# Patient Record
Sex: FEMALE | Race: WHITE | NOT HISPANIC OR LATINO | Employment: OTHER | URBAN - NONMETROPOLITAN AREA
[De-identification: names, ages, dates, MRNs, and addresses within clinical notes are randomized per-mention and may not be internally consistent; named-entity substitution may affect disease eponyms.]

---

## 2019-02-21 ENCOUNTER — TRANSCRIBE ORDERS (OUTPATIENT)
Dept: LAB | Facility: CLINIC | Age: 49
End: 2019-02-21

## 2019-02-21 ENCOUNTER — APPOINTMENT (OUTPATIENT)
Dept: LAB | Facility: CLINIC | Age: 49
End: 2019-02-21
Payer: COMMERCIAL

## 2019-02-21 DIAGNOSIS — E55.9 VITAMIN D DEFICIENCY: Primary | ICD-10-CM

## 2019-02-21 DIAGNOSIS — E66.9 OBESITY, UNSPECIFIED CLASSIFICATION, UNSPECIFIED OBESITY TYPE, UNSPECIFIED WHETHER SERIOUS COMORBIDITY PRESENT: ICD-10-CM

## 2019-02-21 DIAGNOSIS — E55.9 VITAMIN D DEFICIENCY: ICD-10-CM

## 2019-02-21 DIAGNOSIS — R73.03 PRE-DIABETES: ICD-10-CM

## 2019-02-21 LAB
25(OH)D3 SERPL-MCNC: 12.5 NG/ML (ref 30–100)
ALBUMIN SERPL BCP-MCNC: 3.8 G/DL (ref 3.5–5)
ALP SERPL-CCNC: 86 U/L (ref 46–116)
ALT SERPL W P-5'-P-CCNC: 17 U/L (ref 12–78)
ANION GAP SERPL CALCULATED.3IONS-SCNC: 6 MMOL/L (ref 4–13)
AST SERPL W P-5'-P-CCNC: 16 U/L (ref 5–45)
BASOPHILS # BLD AUTO: 0.08 THOUSANDS/ΜL (ref 0–0.1)
BASOPHILS NFR BLD AUTO: 1 % (ref 0–1)
BILIRUB SERPL-MCNC: 0.34 MG/DL (ref 0.2–1)
BUN SERPL-MCNC: 15 MG/DL (ref 5–25)
CALCIUM SERPL-MCNC: 8.7 MG/DL (ref 8.3–10.1)
CHLORIDE SERPL-SCNC: 110 MMOL/L (ref 100–108)
CHOLEST SERPL-MCNC: 217 MG/DL (ref 50–200)
CO2 SERPL-SCNC: 23 MMOL/L (ref 21–32)
CREAT SERPL-MCNC: 0.81 MG/DL (ref 0.6–1.3)
EOSINOPHIL # BLD AUTO: 0.1 THOUSAND/ΜL (ref 0–0.61)
EOSINOPHIL NFR BLD AUTO: 1 % (ref 0–6)
ERYTHROCYTE [DISTWIDTH] IN BLOOD BY AUTOMATED COUNT: 13.4 % (ref 11.6–15.1)
EST. AVERAGE GLUCOSE BLD GHB EST-MCNC: 105 MG/DL
GFR SERPL CREATININE-BSD FRML MDRD: 86 ML/MIN/1.73SQ M
GLUCOSE P FAST SERPL-MCNC: 82 MG/DL (ref 65–99)
HBA1C MFR BLD: 5.3 % (ref 4.2–6.3)
HCT VFR BLD AUTO: 43.6 % (ref 34.8–46.1)
HDLC SERPL-MCNC: 49 MG/DL (ref 40–60)
HGB BLD-MCNC: 14.8 G/DL (ref 11.5–15.4)
IMM GRANULOCYTES # BLD AUTO: 0.01 THOUSAND/UL (ref 0–0.2)
IMM GRANULOCYTES NFR BLD AUTO: 0 % (ref 0–2)
INSULIN SERPL-ACNC: 8.7 MU/L (ref 3–25)
LDLC SERPL CALC-MCNC: 146 MG/DL (ref 0–100)
LYMPHOCYTES # BLD AUTO: 2.16 THOUSANDS/ΜL (ref 0.6–4.47)
LYMPHOCYTES NFR BLD AUTO: 30 % (ref 14–44)
MCH RBC QN AUTO: 30.9 PG (ref 26.8–34.3)
MCHC RBC AUTO-ENTMCNC: 33.9 G/DL (ref 31.4–37.4)
MCV RBC AUTO: 91 FL (ref 82–98)
MONOCYTES # BLD AUTO: 0.59 THOUSAND/ΜL (ref 0.17–1.22)
MONOCYTES NFR BLD AUTO: 8 % (ref 4–12)
NEUTROPHILS # BLD AUTO: 4.27 THOUSANDS/ΜL (ref 1.85–7.62)
NEUTS SEG NFR BLD AUTO: 60 % (ref 43–75)
NONHDLC SERPL-MCNC: 168 MG/DL
NRBC BLD AUTO-RTO: 0 /100 WBCS
PLATELET # BLD AUTO: 234 THOUSANDS/UL (ref 149–390)
PMV BLD AUTO: 10.4 FL (ref 8.9–12.7)
POTASSIUM SERPL-SCNC: 4.1 MMOL/L (ref 3.5–5.3)
PROT SERPL-MCNC: 7.5 G/DL (ref 6.4–8.2)
RBC # BLD AUTO: 4.79 MILLION/UL (ref 3.81–5.12)
SODIUM SERPL-SCNC: 139 MMOL/L (ref 136–145)
TRIGL SERPL-MCNC: 110 MG/DL
TSH SERPL DL<=0.05 MIU/L-ACNC: 1.18 UIU/ML (ref 0.36–3.74)
WBC # BLD AUTO: 7.21 THOUSAND/UL (ref 4.31–10.16)

## 2019-02-21 PROCEDURE — 80053 COMPREHEN METABOLIC PANEL: CPT

## 2019-02-21 PROCEDURE — 36415 COLL VENOUS BLD VENIPUNCTURE: CPT

## 2019-02-21 PROCEDURE — 80061 LIPID PANEL: CPT

## 2019-02-21 PROCEDURE — 85025 COMPLETE CBC W/AUTO DIFF WBC: CPT

## 2019-02-21 PROCEDURE — 82306 VITAMIN D 25 HYDROXY: CPT

## 2019-02-21 PROCEDURE — 83525 ASSAY OF INSULIN: CPT

## 2019-02-21 PROCEDURE — 84443 ASSAY THYROID STIM HORMONE: CPT

## 2019-02-21 PROCEDURE — 83036 HEMOGLOBIN GLYCOSYLATED A1C: CPT

## 2019-02-26 ENCOUNTER — TRANSCRIBE ORDERS (OUTPATIENT)
Dept: ADMINISTRATIVE | Facility: HOSPITAL | Age: 49
End: 2019-02-26

## 2019-02-26 DIAGNOSIS — Z12.39 SCREENING BREAST EXAMINATION: Primary | ICD-10-CM

## 2019-03-27 ENCOUNTER — APPOINTMENT (OUTPATIENT)
Dept: LAB | Facility: HOSPITAL | Age: 49
End: 2019-03-27
Attending: SPECIALIST
Payer: COMMERCIAL

## 2019-03-27 ENCOUNTER — TRANSCRIBE ORDERS (OUTPATIENT)
Dept: ADMINISTRATIVE | Facility: HOSPITAL | Age: 49
End: 2019-03-27

## 2019-03-27 DIAGNOSIS — R30.0 DYSURIA: ICD-10-CM

## 2019-03-27 DIAGNOSIS — R35.0 FREQUENCY OF MICTURITION: Primary | ICD-10-CM

## 2019-03-27 LAB
BACTERIA UR QL AUTO: ABNORMAL /HPF
BILIRUB UR QL STRIP: NEGATIVE
CLARITY UR: ABNORMAL
COLOR UR: YELLOW
GLUCOSE UR STRIP-MCNC: NEGATIVE MG/DL
HGB UR QL STRIP.AUTO: ABNORMAL
KETONES UR STRIP-MCNC: NEGATIVE MG/DL
LEUKOCYTE ESTERASE UR QL STRIP: NEGATIVE
MUCOUS THREADS UR QL AUTO: ABNORMAL
NITRITE UR QL STRIP: NEGATIVE
NON-SQ EPI CELLS URNS QL MICRO: ABNORMAL /HPF
PH UR STRIP.AUTO: 5 [PH]
PROT UR STRIP-MCNC: NEGATIVE MG/DL
RBC #/AREA URNS AUTO: ABNORMAL /HPF
SP GR UR STRIP.AUTO: >=1.03 (ref 1–1.03)
UROBILINOGEN UR QL STRIP.AUTO: 0.2 E.U./DL
WBC #/AREA URNS AUTO: ABNORMAL /HPF

## 2019-03-27 PROCEDURE — 81001 URINALYSIS AUTO W/SCOPE: CPT | Performed by: SPECIALIST

## 2019-03-27 PROCEDURE — 87086 URINE CULTURE/COLONY COUNT: CPT

## 2019-03-27 PROCEDURE — 81003 URINALYSIS AUTO W/O SCOPE: CPT | Performed by: SPECIALIST

## 2019-03-28 LAB — BACTERIA UR CULT: NORMAL

## 2019-04-03 ENCOUNTER — TRANSCRIBE ORDERS (OUTPATIENT)
Dept: LAB | Facility: CLINIC | Age: 49
End: 2019-04-03

## 2019-04-03 ENCOUNTER — APPOINTMENT (OUTPATIENT)
Dept: LAB | Facility: HOSPITAL | Age: 49
End: 2019-04-03
Attending: SPECIALIST
Payer: COMMERCIAL

## 2019-04-03 ENCOUNTER — APPOINTMENT (OUTPATIENT)
Dept: LAB | Facility: CLINIC | Age: 49
End: 2019-04-03
Payer: COMMERCIAL

## 2019-04-03 DIAGNOSIS — Z13.9 SCREENING FOR UNSPECIFIED CONDITION: ICD-10-CM

## 2019-04-03 DIAGNOSIS — Z13.9 SCREENING FOR UNSPECIFIED CONDITION: Primary | ICD-10-CM

## 2019-04-03 PROCEDURE — 81001 URINALYSIS AUTO W/SCOPE: CPT | Performed by: SPECIALIST

## 2019-04-03 PROCEDURE — 86803 HEPATITIS C AB TEST: CPT

## 2019-04-03 PROCEDURE — 81003 URINALYSIS AUTO W/O SCOPE: CPT | Performed by: SPECIALIST

## 2019-04-03 PROCEDURE — 36415 COLL VENOUS BLD VENIPUNCTURE: CPT

## 2019-04-03 PROCEDURE — 87389 HIV-1 AG W/HIV-1&-2 AB AG IA: CPT

## 2019-04-04 LAB — HCV AB SER QL: NORMAL

## 2019-04-05 LAB — HIV 1+2 AB+HIV1 P24 AG SERPL QL IA: NORMAL

## 2019-04-08 ENCOUNTER — TELEPHONE (OUTPATIENT)
Dept: UROLOGY | Facility: AMBULATORY SURGERY CENTER | Age: 49
End: 2019-04-08

## 2019-04-10 RX ORDER — ATORVASTATIN CALCIUM 10 MG/1
10 TABLET, FILM COATED ORAL DAILY
Refills: 5 | COMMUNITY
Start: 2019-04-02 | End: 2020-08-26 | Stop reason: SDUPTHER

## 2019-04-11 ENCOUNTER — OFFICE VISIT (OUTPATIENT)
Dept: UROLOGY | Facility: MEDICAL CENTER | Age: 49
End: 2019-04-11
Payer: COMMERCIAL

## 2019-04-11 VITALS
DIASTOLIC BLOOD PRESSURE: 80 MMHG | SYSTOLIC BLOOD PRESSURE: 132 MMHG | HEART RATE: 90 BPM | HEIGHT: 64 IN | WEIGHT: 218 LBS | BODY MASS INDEX: 37.22 KG/M2

## 2019-04-11 DIAGNOSIS — R31.29 MICROHEMATURIA: Primary | ICD-10-CM

## 2019-04-11 DIAGNOSIS — R35.0 URINARY FREQUENCY: ICD-10-CM

## 2019-04-11 LAB
SL AMB  POCT GLUCOSE, UA: ABNORMAL
SL AMB LEUKOCYTE ESTERASE,UA: ABNORMAL
SL AMB POCT BILIRUBIN,UA: ABNORMAL
SL AMB POCT BLOOD,UA: ABNORMAL
SL AMB POCT CLARITY,UA: CLEAR
SL AMB POCT COLOR,UA: YELLOW
SL AMB POCT KETONES,UA: ABNORMAL
SL AMB POCT NITRITE,UA: ABNORMAL
SL AMB POCT PH,UA: 5.5
SL AMB POCT SPECIFIC GRAVITY,UA: 1.02
SL AMB POCT URINE PROTEIN: ABNORMAL
SL AMB POCT UROBILINOGEN: 0.2

## 2019-04-11 PROCEDURE — 81003 URINALYSIS AUTO W/O SCOPE: CPT | Performed by: UROLOGY

## 2019-04-11 PROCEDURE — 99244 OFF/OP CNSLTJ NEW/EST MOD 40: CPT | Performed by: UROLOGY

## 2019-04-11 RX ORDER — MELATONIN
1000 DAILY
COMMUNITY
End: 2020-02-11

## 2019-04-11 RX ORDER — OXYBUTYNIN CHLORIDE 5 MG/1
5 TABLET ORAL 3 TIMES DAILY
Qty: 60 TABLET | Refills: 3 | Status: SHIPPED | OUTPATIENT
Start: 2019-04-11 | End: 2020-02-11

## 2019-04-18 ENCOUNTER — HOSPITAL ENCOUNTER (OUTPATIENT)
Dept: CT IMAGING | Facility: HOSPITAL | Age: 49
Discharge: HOME | End: 2019-04-18
Attending: UROLOGY
Payer: COMMERCIAL

## 2019-04-18 DIAGNOSIS — R31.29 MICROHEMATURIA: ICD-10-CM

## 2019-04-18 PROCEDURE — 74178 CT ABD&PLV WO CNTR FLWD CNTR: CPT

## 2019-04-18 RX ADMIN — IOHEXOL 120 ML: 350 INJECTION, SOLUTION INTRAVENOUS at 07:59

## 2019-05-01 ENCOUNTER — PROCEDURE VISIT (OUTPATIENT)
Dept: UROLOGY | Facility: MEDICAL CENTER | Age: 49
End: 2019-05-01
Payer: COMMERCIAL

## 2019-05-01 VITALS
SYSTOLIC BLOOD PRESSURE: 160 MMHG | WEIGHT: 212 LBS | DIASTOLIC BLOOD PRESSURE: 80 MMHG | HEART RATE: 80 BPM | BODY MASS INDEX: 36.39 KG/M2

## 2019-05-01 DIAGNOSIS — R31.29 MICROHEMATURIA: Primary | ICD-10-CM

## 2019-05-01 LAB
SL AMB  POCT GLUCOSE, UA: ABNORMAL
SL AMB LEUKOCYTE ESTERASE,UA: ABNORMAL
SL AMB POCT BILIRUBIN,UA: ABNORMAL
SL AMB POCT BLOOD,UA: ABNORMAL
SL AMB POCT CLARITY,UA: CLEAR
SL AMB POCT COLOR,UA: YELLOW
SL AMB POCT KETONES,UA: ABNORMAL
SL AMB POCT NITRITE,UA: ABNORMAL
SL AMB POCT PH,UA: 5
SL AMB POCT SPECIFIC GRAVITY,UA: 1.02
SL AMB POCT URINE PROTEIN: ABNORMAL
SL AMB POCT UROBILINOGEN: 0.2

## 2019-05-01 PROCEDURE — 52000 CYSTOURETHROSCOPY: CPT | Performed by: UROLOGY

## 2019-05-01 PROCEDURE — 81003 URINALYSIS AUTO W/O SCOPE: CPT | Performed by: UROLOGY

## 2019-05-01 PROCEDURE — 99213 OFFICE O/P EST LOW 20 MIN: CPT | Performed by: UROLOGY

## 2019-06-13 ENCOUNTER — TRANSCRIBE ORDERS (OUTPATIENT)
Dept: LAB | Facility: CLINIC | Age: 49
End: 2019-06-13

## 2019-06-13 ENCOUNTER — APPOINTMENT (OUTPATIENT)
Dept: LAB | Facility: CLINIC | Age: 49
End: 2019-06-13
Payer: COMMERCIAL

## 2019-06-13 DIAGNOSIS — E78.5 HYPERLIPIDEMIA, UNSPECIFIED HYPERLIPIDEMIA TYPE: ICD-10-CM

## 2019-06-13 DIAGNOSIS — E55.9 VITAMIN D INSUFFICIENCY: ICD-10-CM

## 2019-06-13 DIAGNOSIS — E78.5 HYPERLIPIDEMIA, UNSPECIFIED HYPERLIPIDEMIA TYPE: Primary | ICD-10-CM

## 2019-06-13 LAB
25(OH)D3 SERPL-MCNC: 69.6 NG/ML (ref 30–100)
ALBUMIN SERPL BCP-MCNC: 3.6 G/DL (ref 3.5–5)
ALP SERPL-CCNC: 108 U/L (ref 46–116)
ALT SERPL W P-5'-P-CCNC: 22 U/L (ref 12–78)
ANION GAP SERPL CALCULATED.3IONS-SCNC: 3 MMOL/L (ref 4–13)
AST SERPL W P-5'-P-CCNC: 17 U/L (ref 5–45)
BILIRUB SERPL-MCNC: 0.32 MG/DL (ref 0.2–1)
BUN SERPL-MCNC: 13 MG/DL (ref 5–25)
CALCIUM SERPL-MCNC: 8.9 MG/DL (ref 8.3–10.1)
CHLORIDE SERPL-SCNC: 110 MMOL/L (ref 100–108)
CHOLEST SERPL-MCNC: 149 MG/DL (ref 50–200)
CO2 SERPL-SCNC: 27 MMOL/L (ref 21–32)
CREAT SERPL-MCNC: 0.86 MG/DL (ref 0.6–1.3)
GFR SERPL CREATININE-BSD FRML MDRD: 80 ML/MIN/1.73SQ M
GLUCOSE P FAST SERPL-MCNC: 85 MG/DL (ref 65–99)
LDLC SERPL DIRECT ASSAY-MCNC: 78 MG/DL (ref 0–100)
POTASSIUM SERPL-SCNC: 3.9 MMOL/L (ref 3.5–5.3)
PROT SERPL-MCNC: 7.1 G/DL (ref 6.4–8.2)
SODIUM SERPL-SCNC: 140 MMOL/L (ref 136–145)

## 2019-06-13 PROCEDURE — 82306 VITAMIN D 25 HYDROXY: CPT

## 2019-06-13 PROCEDURE — 80053 COMPREHEN METABOLIC PANEL: CPT

## 2019-06-13 PROCEDURE — 82465 ASSAY BLD/SERUM CHOLESTEROL: CPT

## 2019-06-13 PROCEDURE — 36415 COLL VENOUS BLD VENIPUNCTURE: CPT

## 2019-06-13 PROCEDURE — 83721 ASSAY OF BLOOD LIPOPROTEIN: CPT

## 2019-09-28 ENCOUNTER — HOSPITAL ENCOUNTER (EMERGENCY)
Facility: HOSPITAL | Age: 49
Discharge: HOME | End: 2019-09-28
Attending: EMERGENCY MEDICINE | Admitting: EMERGENCY MEDICINE
Payer: COMMERCIAL

## 2019-09-28 VITALS
WEIGHT: 216.49 LBS | BODY MASS INDEX: 36.96 KG/M2 | RESPIRATION RATE: 19 BRPM | OXYGEN SATURATION: 97 % | HEART RATE: 110 BPM | TEMPERATURE: 99.3 F | SYSTOLIC BLOOD PRESSURE: 172 MMHG | DIASTOLIC BLOOD PRESSURE: 90 MMHG | HEIGHT: 64 IN

## 2019-09-28 DIAGNOSIS — K64.4 EXTERNAL HEMORRHOID, BLEEDING: Primary | ICD-10-CM

## 2019-09-28 PROCEDURE — 99284 EMERGENCY DEPT VISIT MOD MDM: CPT

## 2019-09-28 PROCEDURE — 99282 EMERGENCY DEPT VISIT SF MDM: CPT | Performed by: PHYSICIAN ASSISTANT

## 2019-09-28 NOTE — ED PROVIDER NOTES
History  Chief Complaint   Patient presents with    Rectal Bleeding     pt states was in [place] awoke this am at 0430 with blood in pad and on toilet  started with rectal bleeding  unsure if it is hemorhoid caused  Patient presents to the emergency department today for evaluation of rectal bleeding  She states she noticed the symptoms initially 4 days ago, symptom ceased, she was told that she has external hemorrhoids my close friends who examined her  She had noted more blood over night  She denies nausea vomiting or abdominal pain  Denies urinary urgency frequency burning or hematuria  No history of vaginal discharge  She states the area is slightly itchy but denies pain  No history of lightheadedness dizziness weakness  Prior to Admission Medications   Prescriptions Last Dose Informant Patient Reported? Taking? FAMOTIDINE PO 9/28/2019 at Unknown time Self Yes Yes   atorvastatin (LIPITOR) 10 mg tablet 9/28/2019 at Unknown time Self Yes Yes   cholecalciferol (VITAMIN D3) 1,000 units tablet Not Taking at Unknown time Self Yes No   Sig: Take 1,000 Units by mouth daily   metroNIDAZOLE (FLAGYL PO) 9/28/2019 at Unknown time Self Yes Yes   oxybutynin (DITROPAN) 5 mg tablet Not Taking at Unknown time Self No No   Sig: Take 1 tablet (5 mg total) by mouth 3 (three) times a day   Patient not taking: Reported on 5/1/2019      Facility-Administered Medications: None       Past Medical History:   Diagnosis Date    Acid reflux     Bacterial vaginosis     Cleft palate     Frequency of urination     HPV (human papilloma virus) anogenital infection     Hyperhidrosis     Perforated ear drum     Raynaud's disease     Vocal cords swelling        Past Surgical History:   Procedure Laterality Date    CLEFT PALATE REPAIR      TYMPANOPLASTY Left     TYMPANOSTOMY TUBE PLACEMENT         History reviewed  No pertinent family history    I have reviewed and agree with the history as documented  Social History     Tobacco Use    Smoking status: Current Every Day Smoker     Packs/day: 1 00     Types: Cigarettes    Smokeless tobacco: Never Used   Substance Use Topics    Alcohol use: Not Currently    Drug use: Never        Review of Systems   Constitutional: Negative  Negative for chills and fever  HENT: Negative  Negative for sore throat and trouble swallowing  Eyes: Negative  Respiratory: Negative  Negative for cough, shortness of breath and wheezing  Cardiovascular: Negative  Negative for chest pain and leg swelling  Gastrointestinal: Positive for anal bleeding  Negative for abdominal distention, abdominal pain, blood in stool, constipation, diarrhea, nausea, rectal pain and vomiting  Endocrine: Negative  Genitourinary: Negative  Musculoskeletal: Negative  Negative for arthralgias, back pain, gait problem, joint swelling, myalgias, neck pain and neck stiffness  Skin: Negative  Allergic/Immunologic: Negative  Neurological: Negative  Negative for dizziness, seizures, speech difficulty, weakness, light-headedness, numbness and headaches  Hematological: Negative  Psychiatric/Behavioral: Negative  All other systems reviewed and are negative  Physical Exam  Physical Exam   Constitutional: She is oriented to person, place, and time  Vital signs are normal  She appears well-developed and well-nourished  She does not have a sickly appearance  She does not appear ill  No distress  HENT:   Right Ear: External ear normal  No swelling  Tympanic membrane is not bulging  Left Ear: External ear normal  No swelling  Tympanic membrane is not bulging  Nose: Nose normal    Mouth/Throat: Oropharynx is clear and moist  No oropharyngeal exudate  Eyes: Pupils are equal, round, and reactive to light  Conjunctivae, EOM and lids are normal    Neck: Normal range of motion  Neck supple  No JVD present   No tracheal deviation, no edema and normal range of motion present  No thyromegaly present  Cardiovascular: Normal rate, regular rhythm, normal heart sounds, intact distal pulses and normal pulses  Exam reveals no gallop and no friction rub  No murmur heard  Pulmonary/Chest: Effort normal and breath sounds normal  No stridor  No respiratory distress  She has no wheezes  She has no rales  She exhibits no tenderness  Abdominal: Soft  Bowel sounds are normal  She exhibits no distension and no mass  There is no tenderness  There is no rebound, no guarding and negative Orozco's sign  No hernia  Genitourinary:       Pelvic exam was performed with patient in the knee-chest position  Musculoskeletal: Normal range of motion  She exhibits no edema or tenderness  Lymphadenopathy:     She has no cervical adenopathy  Neurological: She is alert and oriented to person, place, and time  She has normal strength and normal reflexes  No cranial nerve deficit or sensory deficit  GCS eye subscore is 4  GCS verbal subscore is 5  GCS motor subscore is 6  Skin: Skin is warm and dry  Capillary refill takes less than 2 seconds  No rash noted  She is not diaphoretic  No erythema  No pallor  Psychiatric: She has a normal mood and affect  Her speech is normal and behavior is normal    Vitals reviewed        Vital Signs  ED Triage Vitals [09/28/19 1227]   Temperature Pulse Respirations Blood Pressure SpO2   99 3 °F (37 4 °C) (!) 110 19 (!) 172/90 97 %      Temp Source Heart Rate Source Patient Position - Orthostatic VS BP Location FiO2 (%)   Temporal Monitor Lying Right arm --      Pain Score       No Pain           Vitals:    09/28/19 1227   BP: (!) 172/90   Pulse: (!) 110   Patient Position - Orthostatic VS: Lying         Visual Acuity      ED Medications  Medications - No data to display    Diagnostic Studies  Results Reviewed     None                 No orders to display              Procedures  Procedures       ED Course  ED Course as of Sep 28 1318   Sat Sep 28, 2019   1243 Blood Pressure(!): 172/90   1243 Temperature: 99 3 °F (37 4 °C)   1243 Pulse(!): 110   1243 Respirations: 19   1243 SpO2: 97 %                               MDM    Disposition  Final diagnoses:   External hemorrhoid, bleeding     Time reflects when diagnosis was documented in both MDM as applicable and the Disposition within this note     Time User Action Codes Description Comment    9/28/2019  1:09 PM Asha Jennings Add [K64 4] External hemorrhoid, bleeding       ED Disposition     ED Disposition Condition Date/Time Comment    Discharge Stable Sat Sep 28, 2019  1:09 PM Avinash Arora discharge to home/self care  Follow-up Information     Follow up With Specialties Details Why [address] [place] [address],  Family Medicine Schedule an appointment as soon as possible for a visit   [address] [place] [address],  General Surgery, Wound Care Schedule an appointment as soon as possible for a visit   [address] [address]  [phone]            Patient's Medications   Discharge Prescriptions    HYDROCORTISONE (ANUSOL-HC) 2 5 % RECTAL CREAM    Apply rectally 2 times daily       Start Date: 9/28/2019 End Date: --       Order Dose: --       Quantity: 28 35 g    Refills: 0     No discharge procedures on file      ED Provider  Electronically Signed by           Taryn Bojorquez PA-C  09/28/19 3053

## 2019-10-01 ENCOUNTER — OFFICE VISIT (OUTPATIENT)
Dept: SURGERY | Facility: CLINIC | Age: 49
End: 2019-10-01
Payer: COMMERCIAL

## 2019-10-01 VITALS
HEIGHT: 64 IN | TEMPERATURE: 99.2 F | DIASTOLIC BLOOD PRESSURE: 88 MMHG | HEART RATE: 108 BPM | SYSTOLIC BLOOD PRESSURE: 173 MMHG | WEIGHT: 212 LBS | BODY MASS INDEX: 36.19 KG/M2

## 2019-10-01 DIAGNOSIS — K64.9 HEMORRHOIDS, UNSPECIFIED HEMORRHOID TYPE: Primary | ICD-10-CM

## 2019-10-01 PROCEDURE — 99202 OFFICE O/P NEW SF 15 MIN: CPT | Performed by: SURGERY

## 2019-10-02 PROBLEM — K64.9 HEMORRHOIDS: Status: ACTIVE | Noted: 2019-10-02

## 2019-10-02 NOTE — ASSESSMENT & PLAN NOTE
Inflamed left external hemorrhoids  Noted stigmata of recent bleed  Mild tender palpation on exam   No palpable anorectal masses  No significant active bleeding noted  For now would recommend continued Sitz baths twice a day and after bowel movements  Symptom control with preparation H or wipes is appropriate  I suspect her her hemorrhoids will settle down the next couple weeks  We discussed dietary changes increasing fiber  Encourage her to take Metamucil daily  She may add MiraLax and Colace if needed  Avoid straining

## 2019-10-08 ENCOUNTER — TRANSCRIBE ORDERS (OUTPATIENT)
Dept: LAB | Facility: CLINIC | Age: 49
End: 2019-10-08

## 2019-10-08 ENCOUNTER — APPOINTMENT (OUTPATIENT)
Dept: LAB | Facility: CLINIC | Age: 49
End: 2019-10-08
Payer: COMMERCIAL

## 2019-10-08 DIAGNOSIS — E55.9 VITAMIN D DEFICIENCY: Primary | ICD-10-CM

## 2019-10-08 DIAGNOSIS — E55.9 VITAMIN D DEFICIENCY: ICD-10-CM

## 2019-10-08 DIAGNOSIS — I15.9 SECONDARY HYPERTENSION: ICD-10-CM

## 2019-10-08 LAB
25(OH)D3 SERPL-MCNC: 31.1 NG/ML (ref 30–100)
ALBUMIN SERPL BCP-MCNC: 3.5 G/DL (ref 3.5–5)
ALP SERPL-CCNC: 82 U/L (ref 46–116)
ALT SERPL W P-5'-P-CCNC: 17 U/L (ref 12–78)
ANION GAP SERPL CALCULATED.3IONS-SCNC: 7 MMOL/L (ref 4–13)
AST SERPL W P-5'-P-CCNC: 16 U/L (ref 5–45)
BASOPHILS # BLD AUTO: 0.08 THOUSANDS/ΜL (ref 0–0.1)
BASOPHILS NFR BLD AUTO: 1 % (ref 0–1)
BILIRUB SERPL-MCNC: 0.37 MG/DL (ref 0.2–1)
BUN SERPL-MCNC: 8 MG/DL (ref 5–25)
CALCIUM SERPL-MCNC: 8.9 MG/DL (ref 8.3–10.1)
CHLORIDE SERPL-SCNC: 109 MMOL/L (ref 100–108)
CO2 SERPL-SCNC: 25 MMOL/L (ref 21–32)
CREAT SERPL-MCNC: 0.82 MG/DL (ref 0.6–1.3)
EOSINOPHIL # BLD AUTO: 0.11 THOUSAND/ΜL (ref 0–0.61)
EOSINOPHIL NFR BLD AUTO: 2 % (ref 0–6)
ERYTHROCYTE [DISTWIDTH] IN BLOOD BY AUTOMATED COUNT: 13.5 % (ref 11.6–15.1)
EST. AVERAGE GLUCOSE BLD GHB EST-MCNC: 105 MG/DL
GFR SERPL CREATININE-BSD FRML MDRD: 84 ML/MIN/1.73SQ M
GLUCOSE P FAST SERPL-MCNC: 84 MG/DL (ref 65–99)
HBA1C MFR BLD: 5.3 % (ref 4.2–6.3)
HCT VFR BLD AUTO: 42 % (ref 34.8–46.1)
HGB BLD-MCNC: 13.8 G/DL (ref 11.5–15.4)
IMM GRANULOCYTES # BLD AUTO: 0.02 THOUSAND/UL (ref 0–0.2)
IMM GRANULOCYTES NFR BLD AUTO: 0 % (ref 0–2)
LDLC SERPL DIRECT ASSAY-MCNC: 69 MG/DL (ref 0–100)
LYMPHOCYTES # BLD AUTO: 1.99 THOUSANDS/ΜL (ref 0.6–4.47)
LYMPHOCYTES NFR BLD AUTO: 29 % (ref 14–44)
MCH RBC QN AUTO: 30.7 PG (ref 26.8–34.3)
MCHC RBC AUTO-ENTMCNC: 32.9 G/DL (ref 31.4–37.4)
MCV RBC AUTO: 93 FL (ref 82–98)
MONOCYTES # BLD AUTO: 0.65 THOUSAND/ΜL (ref 0.17–1.22)
MONOCYTES NFR BLD AUTO: 10 % (ref 4–12)
NEUTROPHILS # BLD AUTO: 4.02 THOUSANDS/ΜL (ref 1.85–7.62)
NEUTS SEG NFR BLD AUTO: 58 % (ref 43–75)
NRBC BLD AUTO-RTO: 0 /100 WBCS
PLATELET # BLD AUTO: 243 THOUSANDS/UL (ref 149–390)
PMV BLD AUTO: 11.1 FL (ref 8.9–12.7)
POTASSIUM SERPL-SCNC: 3.7 MMOL/L (ref 3.5–5.3)
PROT SERPL-MCNC: 6.9 G/DL (ref 6.4–8.2)
RBC # BLD AUTO: 4.5 MILLION/UL (ref 3.81–5.12)
SODIUM SERPL-SCNC: 141 MMOL/L (ref 136–145)
TSH SERPL DL<=0.05 MIU/L-ACNC: 1.91 UIU/ML (ref 0.36–3.74)
WBC # BLD AUTO: 6.87 THOUSAND/UL (ref 4.31–10.16)

## 2019-10-08 PROCEDURE — 36415 COLL VENOUS BLD VENIPUNCTURE: CPT

## 2019-10-08 PROCEDURE — 83036 HEMOGLOBIN GLYCOSYLATED A1C: CPT

## 2019-10-08 PROCEDURE — 83721 ASSAY OF BLOOD LIPOPROTEIN: CPT

## 2019-10-08 PROCEDURE — 82306 VITAMIN D 25 HYDROXY: CPT

## 2019-10-08 PROCEDURE — 84443 ASSAY THYROID STIM HORMONE: CPT

## 2019-10-08 PROCEDURE — 85025 COMPLETE CBC W/AUTO DIFF WBC: CPT

## 2019-10-08 PROCEDURE — 80053 COMPREHEN METABOLIC PANEL: CPT

## 2020-01-27 ENCOUNTER — HOSPITAL ENCOUNTER (OUTPATIENT)
Dept: MAMMOGRAPHY | Facility: HOSPITAL | Age: 50
Discharge: HOME | End: 2020-01-27
Payer: COMMERCIAL

## 2020-01-27 DIAGNOSIS — Z12.31 ENCOUNTER FOR SCREENING MAMMOGRAM FOR BREAST CANCER: ICD-10-CM

## 2020-01-27 DIAGNOSIS — Z12.39 SCREENING BREAST EXAMINATION: ICD-10-CM

## 2020-01-27 PROCEDURE — 77067 SCR MAMMO BI INCL CAD: CPT

## 2020-01-27 PROCEDURE — 77063 BREAST TOMOSYNTHESIS BI: CPT

## 2020-01-30 ENCOUNTER — HOSPITAL ENCOUNTER (OUTPATIENT)
Dept: ULTRASOUND IMAGING | Facility: HOSPITAL | Age: 50
Discharge: HOME | End: 2020-01-30
Payer: COMMERCIAL

## 2020-01-30 ENCOUNTER — HOSPITAL ENCOUNTER (OUTPATIENT)
Dept: MAMMOGRAPHY | Facility: HOSPITAL | Age: 50
Discharge: HOME | End: 2020-01-30
Payer: COMMERCIAL

## 2020-01-30 VITALS — BODY MASS INDEX: 36.19 KG/M2 | WEIGHT: 212 LBS | HEIGHT: 64 IN

## 2020-01-30 DIAGNOSIS — R92.8 ABNORMAL MAMMOGRAM: ICD-10-CM

## 2020-01-30 PROCEDURE — 77065 DX MAMMO INCL CAD UNI: CPT

## 2020-01-30 PROCEDURE — G0279 TOMOSYNTHESIS, MAMMO: HCPCS

## 2020-01-30 PROCEDURE — 76642 ULTRASOUND BREAST LIMITED: CPT

## 2020-02-11 ENCOUNTER — OFFICE VISIT (OUTPATIENT)
Dept: OTOLARYNGOLOGY | Facility: CLINIC | Age: 50
End: 2020-02-11
Payer: COMMERCIAL

## 2020-02-11 VITALS
DIASTOLIC BLOOD PRESSURE: 60 MMHG | OXYGEN SATURATION: 98 % | BODY MASS INDEX: 34.83 KG/M2 | HEIGHT: 64 IN | HEART RATE: 91 BPM | SYSTOLIC BLOOD PRESSURE: 140 MMHG | WEIGHT: 204 LBS

## 2020-02-11 DIAGNOSIS — K21.9 LARYNGOPHARYNGEAL REFLUX (LPR): ICD-10-CM

## 2020-02-11 DIAGNOSIS — H72.91 PERFORATION OF RIGHT TYMPANIC MEMBRANE: Primary | ICD-10-CM

## 2020-02-11 DIAGNOSIS — K21.9 GASTROESOPHAGEAL REFLUX DISEASE, ESOPHAGITIS PRESENCE NOT SPECIFIED: ICD-10-CM

## 2020-02-11 DIAGNOSIS — H61.22 IMPACTED CERUMEN OF LEFT EAR: ICD-10-CM

## 2020-02-11 DIAGNOSIS — Z72.0 TOBACCO USE: ICD-10-CM

## 2020-02-11 PROCEDURE — 99203 OFFICE O/P NEW LOW 30 MIN: CPT | Performed by: OTOLARYNGOLOGY

## 2020-02-11 PROCEDURE — 31575 DIAGNOSTIC LARYNGOSCOPY: CPT | Performed by: OTOLARYNGOLOGY

## 2020-02-11 PROCEDURE — 99406 BEHAV CHNG SMOKING 3-10 MIN: CPT | Performed by: OTOLARYNGOLOGY

## 2020-02-11 PROCEDURE — 69210 REMOVE IMPACTED EAR WAX UNI: CPT | Performed by: OTOLARYNGOLOGY

## 2020-02-11 RX ORDER — DOCUSATE SODIUM 250 MG
250 CAPSULE ORAL AS NEEDED
COMMUNITY
Start: 2019-10-25 | End: 2020-12-07

## 2020-02-11 RX ORDER — OMEPRAZOLE 20 MG/1
20 CAPSULE, DELAYED RELEASE ORAL DAILY
COMMUNITY
Start: 2020-01-26 | End: 2020-05-05

## 2020-02-11 RX ORDER — BISOPROLOL FUMARATE 5 MG/1
5 TABLET ORAL DAILY
COMMUNITY
Start: 2020-01-22 | End: 2020-05-15

## 2020-02-11 RX ORDER — MULTIVITAMIN/IRON/FOLIC ACID 18MG-0.4MG
TABLET ORAL DAILY
COMMUNITY
End: 2020-08-04

## 2020-02-11 NOTE — PROGRESS NOTES
Consultation - Otolaryngology - Head and Neck Surgery  Facial Plastic and Reconstructive Surgery  Alan Guzman 52 y o  female MRN: [id number]  Encounter: [id number]        Assessment/Plan:  1  Perforation of right tympanic membrane     2  Laryngopharyngeal reflux (LPR)     3  Gastroesophageal reflux disease, esophagitis presence not specified     4  Impacted cerumen of left ear     5  Tobacco use         We discussed management options of right TM perforation including paper patch myringoplasty versus tympanoplasty  We discussed risks and benefits of surgery  We discussed pros and cons of each approach  After discussion she will consider her options and will follow-up if she chooses to proceed with surgery  Otherwise I recommend close observation  Laryngopharyngeal reflux: We discussed the ongoing findings of laryngopharyngeal reflux  We discussed the management of laryngopharyngeal reflux with PPI taken 30 minutes before the evening meal, elevation the head of bed, diet modifications, weight loss, and other lifestyle changes to assist with decreasing laryngopharyngeal reflux  Cerumen impaction:  We discussed the nature of cerumen impaction  We discussed appropriate treat with hydrogen peroxide 4-5 drops once per week  We discussed discontinuing the use of any Q-Tips or other objects into the ear  Tobacco abuse: Discussed risks of ongoing cigarette smoking  Discussed the benefits of quitting immediately and prior to any surgery  Discussed options including support, quit date, medications, and family support  Patient voiced understanding and knowledge  Greater than 3 minutes were needed for discussion of tobacco dependence  History of Present Illness   Physician Requesting Consult: Kimberlee Smith DO  Reason for Consult / Principal Problem:  Ruptured eardrum  HPI: Alan Guzman is a 52y o  year old female who presents with right TM perforation    She states this has been present for several years   She was previously following with another ENT though did not any repair done  She feels that this is slowly started to affect her hearing to the point where she is considering repair  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  And denies any ear drainage or ear pain  She also has a history of left TM perforation which was repaired as a child  Additional concern also includes with throat issues  She was recently diagnosed with HPV 16 from Pap smear  She is concerned that it she may also have HPV in her throat  She denies any throat pain, neck masses, weight loss  She is a smoker though is working to reduce the amount of cigarettes that she smokes  She also has frequent heartburn and takes 20 mg of omeprazole at night before bed  Review of systems:  ROS was performed by the MA and documented in the attached note  This was reviewed personally      Historical Information   Past Medical History:   Diagnosis Date    Acid reflux     Bacterial vaginosis     Cleft palate     Frequency of urination     HPV (human papilloma virus) anogenital infection     Hyperhidrosis     Perforated ear drum     Raynaud's disease     Vocal cords swelling      Past Surgical History:   Procedure Laterality Date    CLEFT PALATE REPAIR      TYMPANOPLASTY Left     TYMPANOSTOMY TUBE PLACEMENT       Social History   Social History     Substance and Sexual Activity   Alcohol Use Not Currently     Social History     Substance and Sexual Activity   Drug Use Never     Social History     Tobacco Use   Smoking Status Current Every Day Smoker    Packs/day: 1 00    Types: Cigarettes   Smokeless Tobacco Never Used     Family History:   Family History   Problem Relation Age of Onset    Lung cancer Mother     Heart disease Father     Uterine cancer Sister     No Known Problems Maternal Grandmother     No Known Problems Maternal Grandfather     No Known Problems Paternal Grandmother     No Known Problems Paternal Grandfather     No Known Problems Maternal Aunt     Cancer Paternal Aunt         unknown origin        Current Outpatient Medications on File Prior to Visit   Medication Sig    atorvastatin (LIPITOR) 10 mg tablet     bisoprolol (ZEBETA) 5 mg tablet Take 5 mg by mouth daily    docusate sodium (STOOL SOFTENER LAXATIVE) 250 MG capsule     Multiple Vitamins-Minerals (EQ COMPLETE MULTIVITAMIN-ADULT) TABS     omeprazole (PriLOSEC) 20 mg delayed release capsule Take 20 mg by mouth daily    Psyllium (METAMUCIL MULTIHEALTH FIBER PO)     [DISCONTINUED] cholecalciferol (VITAMIN D3) 1,000 units tablet Take 1,000 Units by mouth daily    [DISCONTINUED] FAMOTIDINE PO     [DISCONTINUED] hydrocortisone (ANUSOL-HC) 2 5 % rectal cream Apply rectally 2 times daily (Patient not taking: Reported on 10/1/2019)    [DISCONTINUED] metroNIDAZOLE (FLAGYL PO)     [DISCONTINUED] oxybutynin (DITROPAN) 5 mg tablet Take 1 tablet (5 mg total) by mouth 3 (three) times a day (Patient not taking: Reported on 5/1/2019)     No current facility-administered medications on file prior to visit  No Known Allergies    Vitals:    02/11/20 1348   BP: 140/60   Pulse: 91   SpO2: 98%       Physical Exam   Constitutional: Oriented to person, place, and time  Well-developed and well-nourished, no apparent distress, non-toxic appearance  Cooperative, able to hear and answer questions without difficulty  Voice: Normal voice quality  Head: Normocephalic, atraumatic  No scars, masses or lesions  Face: Symmetric, no edema, no sinus tenderness  Eyes: Vision grossly intact, extra-ocular movement intact  Ears: External ears normal   Right TM perforation central and dry  Left tympanic membrane intact with intact normal landmarks  No post-auricular erythema or tenderness  Nose: Septum intact, nares clear  Mucosa moist, turbinates well appearing  No crusting, polyps or discharge evident  Oral cavity: Dentition intact  Mucosa moist, lips without lesions or masses  Tongue mobile, floor of mouth soft and flat  Hard palate intact  No masses or lesions  Oropharynx: Uvula is midline, soft palate intact without lesion or mass  Oropharyngeal inlet without obstruction  Tonsils unremarkable  Posterior pharyngeal wall clear  No masses or lesions  Salivary glands:  Parotid glands and submandibular glands symmetric, no enlargement or tenderness  Neck: Normal laryngeal elevation with swallow  Trachea midline  No masses or lesions  No palpable adenopathy  Thyroid: Without tenderness or palpable nodules  Pulmonary/Chest: Normal effort and rate  No respiratory distress  No stertor or stridor  Musculoskeletal: Normal range of motion  Neurological: Cranial nerves 2-12 intact  Skin: Skin is warm and dry  Psychiatric: Normal mood and affect  Procedure: Cerumen debridement left    Indications: Cerumen impaction left    Procedure in detail: After informed verbal consent was obtained the ear was visualized using microscopy  Using cerumen loop, suction, and alligator forceps the cerumen was debrided and the findings below were seen  The patient tolerated the procedure well  FINDINGS:  Left TM intact and clear with normal internal landmarks  Procedure: Flexible fiberoptic laryngoscopy    Indications: Evaluate areas of the upper aerodigestive tract not seen on physical exam    Procedure in detail: After informed verbal consent was obtained the nose was anesthetized using 4% lidocaine and neosynephrine spray  After adequate time for anesthesia the scope was guided easily along the nasal cavity floor and into the nasopharynx  The nasopharynx, oropharynx, hypopharynx and larynx were evaluated with the below listed findings  The scope was removed without difficulty and the patient tolerated the procedure well  Findings: No significant mucopurulence or polyposis in bilateral nasal cavities   No lesions or masses of the nasopharynx, oropharynx, hypopharynx, or larynx  Bilateral vocal cords are full mobile  Moderate erythema of true and false vocal cords  Moderate cobblestoning present at posterior hypopharynx  Imaging Studies: I have personally reviewed pertinent reports  Lab Results: I have personally reviewed pertinent lab results  Greater than 40 minutes were spent in consultation  More than 1/2 of that time was spent in counselling and coordination of care

## 2020-03-17 ENCOUNTER — OFFICE VISIT (OUTPATIENT)
Dept: OTOLARYNGOLOGY | Facility: CLINIC | Age: 50
End: 2020-03-17
Payer: COMMERCIAL

## 2020-03-17 VITALS
BODY MASS INDEX: 34.83 KG/M2 | OXYGEN SATURATION: 98 % | WEIGHT: 204 LBS | DIASTOLIC BLOOD PRESSURE: 80 MMHG | HEIGHT: 64 IN | SYSTOLIC BLOOD PRESSURE: 138 MMHG | HEART RATE: 88 BPM

## 2020-03-17 DIAGNOSIS — H72.91 PERFORATION OF RIGHT TYMPANIC MEMBRANE: Primary | ICD-10-CM

## 2020-03-17 DIAGNOSIS — Z72.0 TOBACCO USE: ICD-10-CM

## 2020-03-17 PROCEDURE — 99213 OFFICE O/P EST LOW 20 MIN: CPT | Performed by: OTOLARYNGOLOGY

## 2020-03-17 NOTE — PROGRESS NOTES
Manuela Christine is a 52 y  o female who presents for re-evaluation of perforated tympanic membrane  She continues to have decreased hearing associated with this perforation  She has also been experiencing intermittent right ear discomfort  She denies any drainage  She was recently seen by her PCP though he was unable to appreciate any perforation  Past Medical History:   Diagnosis Date    Acid reflux     Bacterial vaginosis     Cleft palate     Frequency of urination     HPV (human papilloma virus) anogenital infection     Hyperhidrosis     Perforated ear drum     Raynaud's disease     Vocal cords swelling        /80 (BP Location: Right arm, Cuff Size: Large)   Pulse 88   Ht 5' 4" (1 626 m)   Wt 92 5 kg (204 lb)   SpO2 98%   BMI 35 02 kg/m²       Physical Exam   Constitutional: Oriented to person, place, and time  Well-developed and well-nourished, no apparent distress, non-toxic appearance  Cooperative, able to hear and answer questions without difficulty  Voice: Normal voice quality  Head: Normocephalic, atraumatic  No scars, masses or lesions  Face: Symmetric, no edema, no sinus tenderness  Eyes: Vision grossly intact, extra-ocular movement intact  Ears: External ears normal   Right TM perforation central and dry  Left tympanic membrane intact with intact normal landmarks  No post-auricular erythema or tenderness  Nose: Septum intact, nares clear  Mucosa moist, turbinates well appearing  No crusting, polyps or discharge evident  Oral cavity: Dentition intact  Mucosa moist, lips without lesions or masses  Tongue mobile, floor of mouth soft and flat  Hard palate intact  No masses or lesions  Oropharynx: Uvula is midline, soft palate intact without lesion or mass  Oropharyngeal inlet without obstruction  Tonsils unremarkable  Posterior pharyngeal wall clear  No masses or lesions    Salivary glands:  Parotid glands and submandibular glands symmetric, no enlargement or tenderness  Neck: Normal laryngeal elevation with swallow  Trachea midline  No masses or lesions  No palpable adenopathy  Thyroid: Without tenderness or palpable nodules  Pulmonary/Chest: Normal effort and rate  No respiratory distress  No stertor or stridor  Musculoskeletal: Normal range of motion  Neurological: Cranial nerves 2-12 intact  Skin: Skin is warm and dry  Psychiatric: Normal mood and affect  A/P:  Right TM perforation appears stable  We discussed ongoing management of perforation with paper patch myringoplasty or tympanoplasty  She is still hesitant to proceed with any kind of surgical intervention  I recommend close observation with follow-up in 3 months for re-evaluation  Follow-up sooner if there are any additional concerns  We discussed dry ear precautions in the presence of a perforation  Also recommend obtaining hearing test to evaluate for hearing loss  She states that she had prior hearing tests done a [facility]  I asked her to obtain the records of prior hearing tests so that I can review and compared to any future hearing test     Follow-up in 3 months for re-evaluation, sooner with any additional concerns

## 2020-04-09 ENCOUNTER — HOSPITAL ENCOUNTER (OUTPATIENT)
Dept: ULTRASOUND IMAGING | Facility: HOSPITAL | Age: 50
Discharge: HOME | End: 2020-04-09
Payer: COMMERCIAL

## 2020-04-09 ENCOUNTER — TRANSCRIBE ORDERS (OUTPATIENT)
Dept: ADMINISTRATIVE | Facility: HOSPITAL | Age: 50
End: 2020-04-09

## 2020-04-09 ENCOUNTER — HOSPITAL ENCOUNTER (OUTPATIENT)
Dept: RADIOLOGY | Facility: HOSPITAL | Age: 50
Discharge: HOME | End: 2020-04-09
Payer: COMMERCIAL

## 2020-04-09 ENCOUNTER — APPOINTMENT (OUTPATIENT)
Dept: LAB | Facility: HOSPITAL | Age: 50
End: 2020-04-09
Payer: COMMERCIAL

## 2020-04-09 DIAGNOSIS — M54.2 CERVICALGIA: Primary | ICD-10-CM

## 2020-04-09 DIAGNOSIS — R60.0 LOCALIZED EDEMA: ICD-10-CM

## 2020-04-09 DIAGNOSIS — M54.2 NECK PAIN: ICD-10-CM

## 2020-04-09 DIAGNOSIS — Z74.09 REDUCED MOBILITY: ICD-10-CM

## 2020-04-09 DIAGNOSIS — M54.2 NECK PAIN: Primary | ICD-10-CM

## 2020-04-09 LAB
BASOPHILS # BLD AUTO: 0.07 THOUSANDS/ΜL (ref 0–0.1)
BASOPHILS NFR BLD AUTO: 1 % (ref 0–1)
CRP SERPL QL: <0.5 MG/L
EOSINOPHIL # BLD AUTO: 0 THOUSAND/ΜL (ref 0–0.61)
EOSINOPHIL NFR BLD AUTO: 0 % (ref 0–6)
ERYTHROCYTE [DISTWIDTH] IN BLOOD BY AUTOMATED COUNT: 13.3 % (ref 11.6–15.1)
ERYTHROCYTE [SEDIMENTATION RATE] IN BLOOD: 2 MM/HOUR (ref 0–20)
HCT VFR BLD AUTO: 45 % (ref 34.8–46.1)
HGB BLD-MCNC: 15.4 G/DL (ref 11.5–15.4)
IMM GRANULOCYTES # BLD AUTO: 0.15 THOUSAND/UL (ref 0–0.2)
IMM GRANULOCYTES NFR BLD AUTO: 1 % (ref 0–2)
LYMPHOCYTES # BLD AUTO: 1.78 THOUSANDS/ΜL (ref 0.6–4.47)
LYMPHOCYTES NFR BLD AUTO: 13 % (ref 14–44)
MCH RBC QN AUTO: 31.4 PG (ref 26.8–34.3)
MCHC RBC AUTO-ENTMCNC: 34.2 G/DL (ref 31.4–37.4)
MCV RBC AUTO: 92 FL (ref 82–98)
MONOCYTES # BLD AUTO: 0.81 THOUSAND/ΜL (ref 0.17–1.22)
MONOCYTES NFR BLD AUTO: 6 % (ref 4–12)
NEUTROPHILS # BLD AUTO: 11 THOUSANDS/ΜL (ref 1.85–7.62)
NEUTS SEG NFR BLD AUTO: 79 % (ref 43–75)
NRBC BLD AUTO-RTO: 0 /100 WBCS
PLATELET # BLD AUTO: 286 THOUSANDS/UL (ref 149–390)
PMV BLD AUTO: 9.7 FL (ref 8.9–12.7)
RBC # BLD AUTO: 4.9 MILLION/UL (ref 3.81–5.12)
TSH SERPL DL<=0.05 MIU/L-ACNC: 1.59 UIU/ML (ref 0.36–3.74)
WBC # BLD AUTO: 13.81 THOUSAND/UL (ref 4.31–10.16)

## 2020-04-09 PROCEDURE — 76536 US EXAM OF HEAD AND NECK: CPT

## 2020-04-09 PROCEDURE — 36415 COLL VENOUS BLD VENIPUNCTURE: CPT

## 2020-04-09 PROCEDURE — 86038 ANTINUCLEAR ANTIBODIES: CPT

## 2020-04-09 PROCEDURE — 72040 X-RAY EXAM NECK SPINE 2-3 VW: CPT

## 2020-04-09 PROCEDURE — 85652 RBC SED RATE AUTOMATED: CPT

## 2020-04-09 PROCEDURE — 85025 COMPLETE CBC W/AUTO DIFF WBC: CPT

## 2020-04-09 PROCEDURE — 71046 X-RAY EXAM CHEST 2 VIEWS: CPT

## 2020-04-09 PROCEDURE — 86140 C-REACTIVE PROTEIN: CPT

## 2020-04-09 PROCEDURE — 84443 ASSAY THYROID STIM HORMONE: CPT

## 2020-04-10 LAB — RYE IGE QN: NEGATIVE

## 2020-04-21 ENCOUNTER — TELEMEDICINE (OUTPATIENT)
Dept: OTOLARYNGOLOGY | Facility: CLINIC | Age: 50
End: 2020-04-21
Payer: COMMERCIAL

## 2020-04-21 DIAGNOSIS — R44.2 PHANTOSMIA: Primary | ICD-10-CM

## 2020-04-21 DIAGNOSIS — H72.91 PERFORATION OF RIGHT TYMPANIC MEMBRANE: ICD-10-CM

## 2020-04-21 PROCEDURE — 99214 OFFICE O/P EST MOD 30 MIN: CPT | Performed by: OTOLARYNGOLOGY

## 2020-05-02 ENCOUNTER — APPOINTMENT (EMERGENCY)
Dept: CT IMAGING | Facility: HOSPITAL | Age: 50
End: 2020-05-02
Payer: COMMERCIAL

## 2020-05-02 ENCOUNTER — HOSPITAL ENCOUNTER (EMERGENCY)
Facility: HOSPITAL | Age: 50
Discharge: HOME | End: 2020-05-02
Attending: EMERGENCY MEDICINE | Admitting: EMERGENCY MEDICINE
Payer: COMMERCIAL

## 2020-05-02 ENCOUNTER — OFFICE VISIT (OUTPATIENT)
Dept: URGENT CARE | Facility: CLINIC | Age: 50
End: 2020-05-02
Payer: COMMERCIAL

## 2020-05-02 VITALS
HEART RATE: 155 BPM | OXYGEN SATURATION: 99 % | RESPIRATION RATE: 18 BRPM | SYSTOLIC BLOOD PRESSURE: 185 MMHG | TEMPERATURE: 99.4 F | DIASTOLIC BLOOD PRESSURE: 104 MMHG

## 2020-05-02 VITALS
TEMPERATURE: 98.6 F | HEART RATE: 96 BPM | WEIGHT: 197.75 LBS | SYSTOLIC BLOOD PRESSURE: 145 MMHG | BODY MASS INDEX: 33.76 KG/M2 | HEIGHT: 64 IN | OXYGEN SATURATION: 95 % | DIASTOLIC BLOOD PRESSURE: 76 MMHG | RESPIRATION RATE: 21 BRPM

## 2020-05-02 DIAGNOSIS — E87.6 HYPOKALEMIA: ICD-10-CM

## 2020-05-02 DIAGNOSIS — Z72.0 TOBACCO USE: ICD-10-CM

## 2020-05-02 DIAGNOSIS — R13.10 DYSPHAGIA: Primary | ICD-10-CM

## 2020-05-02 DIAGNOSIS — R00.0 TACHYCARDIA: Primary | ICD-10-CM

## 2020-05-02 DIAGNOSIS — R22.1 NECK SWELLING: ICD-10-CM

## 2020-05-02 DIAGNOSIS — R00.0 TACHYCARDIA: ICD-10-CM

## 2020-05-02 DIAGNOSIS — R07.9 CHEST PAIN: ICD-10-CM

## 2020-05-02 DIAGNOSIS — R13.10 DYSPHAGIA, UNSPECIFIED TYPE: ICD-10-CM

## 2020-05-02 LAB
ALBUMIN SERPL BCP-MCNC: 4.2 G/DL (ref 3.5–5)
ALP SERPL-CCNC: 92 U/L (ref 46–116)
ALT SERPL W P-5'-P-CCNC: 21 U/L (ref 12–78)
ANION GAP SERPL CALCULATED.3IONS-SCNC: 12 MMOL/L (ref 4–13)
APTT PPP: 28 SECONDS (ref 23–37)
AST SERPL W P-5'-P-CCNC: 20 U/L (ref 5–45)
BACTERIA UR QL AUTO: ABNORMAL /HPF
BASOPHILS # BLD AUTO: 0.07 THOUSANDS/ΜL (ref 0–0.1)
BASOPHILS NFR BLD AUTO: 1 % (ref 0–1)
BILIRUB SERPL-MCNC: 0.4 MG/DL (ref 0.2–1)
BILIRUB UR QL STRIP: NEGATIVE
BUN SERPL-MCNC: 7 MG/DL (ref 5–25)
CALCIUM SERPL-MCNC: 9.3 MG/DL (ref 8.3–10.1)
CHLORIDE SERPL-SCNC: 100 MMOL/L (ref 100–108)
CLARITY UR: CLEAR
CO2 SERPL-SCNC: 25 MMOL/L (ref 21–32)
COLOR UR: YELLOW
CREAT SERPL-MCNC: 0.88 MG/DL (ref 0.6–1.3)
EOSINOPHIL # BLD AUTO: 0.01 THOUSAND/ΜL (ref 0–0.61)
EOSINOPHIL NFR BLD AUTO: 0 % (ref 0–6)
ERYTHROCYTE [DISTWIDTH] IN BLOOD BY AUTOMATED COUNT: 12.5 % (ref 11.6–15.1)
GFR SERPL CREATININE-BSD FRML MDRD: 77 ML/MIN/1.73SQ M
GLUCOSE SERPL-MCNC: 101 MG/DL (ref 65–140)
GLUCOSE UR STRIP-MCNC: NEGATIVE MG/DL
HCT VFR BLD AUTO: 43.4 % (ref 34.8–46.1)
HGB BLD-MCNC: 15.2 G/DL (ref 11.5–15.4)
HGB UR QL STRIP.AUTO: ABNORMAL
IMM GRANULOCYTES # BLD AUTO: 0.03 THOUSAND/UL (ref 0–0.2)
IMM GRANULOCYTES NFR BLD AUTO: 0 % (ref 0–2)
INR PPP: 1.08 (ref 0.84–1.19)
KETONES UR STRIP-MCNC: ABNORMAL MG/DL
LEUKOCYTE ESTERASE UR QL STRIP: ABNORMAL
LYMPHOCYTES # BLD AUTO: 1.47 THOUSANDS/ΜL (ref 0.6–4.47)
LYMPHOCYTES NFR BLD AUTO: 19 % (ref 14–44)
MAGNESIUM SERPL-MCNC: 2 MG/DL (ref 1.6–2.6)
MCH RBC QN AUTO: 31.5 PG (ref 26.8–34.3)
MCHC RBC AUTO-ENTMCNC: 35 G/DL (ref 31.4–37.4)
MCV RBC AUTO: 90 FL (ref 82–98)
MONOCYTES # BLD AUTO: 0.62 THOUSAND/ΜL (ref 0.17–1.22)
MONOCYTES NFR BLD AUTO: 8 % (ref 4–12)
NEUTROPHILS # BLD AUTO: 5.72 THOUSANDS/ΜL (ref 1.85–7.62)
NEUTS SEG NFR BLD AUTO: 72 % (ref 43–75)
NITRITE UR QL STRIP: NEGATIVE
NON-SQ EPI CELLS URNS QL MICRO: ABNORMAL /HPF
NRBC BLD AUTO-RTO: 0 /100 WBCS
PH UR STRIP.AUTO: 6 [PH]
PLATELET # BLD AUTO: 271 THOUSANDS/UL (ref 149–390)
PMV BLD AUTO: 9.4 FL (ref 8.9–12.7)
POTASSIUM SERPL-SCNC: 3.3 MMOL/L (ref 3.5–5.3)
PROT SERPL-MCNC: 7.9 G/DL (ref 6.4–8.2)
PROT UR STRIP-MCNC: NEGATIVE MG/DL
PROTHROMBIN TIME: 14 SECONDS (ref 11.6–14.5)
RBC # BLD AUTO: 4.83 MILLION/UL (ref 3.81–5.12)
RBC #/AREA URNS AUTO: ABNORMAL /HPF
SODIUM SERPL-SCNC: 137 MMOL/L (ref 136–145)
SP GR UR STRIP.AUTO: <=1.005 (ref 1–1.03)
TROPONIN I SERPL-MCNC: <0.02 NG/ML
UROBILINOGEN UR QL STRIP.AUTO: 0.2 E.U./DL
WBC # BLD AUTO: 7.92 THOUSAND/UL (ref 4.31–10.16)
WBC #/AREA URNS AUTO: ABNORMAL /HPF

## 2020-05-02 PROCEDURE — 81001 URINALYSIS AUTO W/SCOPE: CPT

## 2020-05-02 PROCEDURE — 85025 COMPLETE CBC W/AUTO DIFF WBC: CPT

## 2020-05-02 PROCEDURE — U0003 INFECTIOUS AGENT DETECTION BY NUCLEIC ACID (DNA OR RNA); SEVERE ACUTE RESPIRATORY SYNDROME CORONAVIRUS 2 (SARS-COV-2) (CORONAVIRUS DISEASE [COVID-19]), AMPLIFIED PROBE TECHNIQUE, MAKING USE OF HIGH THROUGHPUT TECHNOLOGIES AS DESCRIBED BY CMS-2020-01-R: HCPCS | Performed by: NURSE PRACTITIONER

## 2020-05-02 PROCEDURE — 84484 ASSAY OF TROPONIN QUANT: CPT

## 2020-05-02 PROCEDURE — 99283 EMERGENCY DEPT VISIT LOW MDM: CPT | Performed by: PHYSICIAN ASSISTANT

## 2020-05-02 PROCEDURE — 99284 EMERGENCY DEPT VISIT MOD MDM: CPT | Performed by: NURSE PRACTITIONER

## 2020-05-02 PROCEDURE — 80053 COMPREHEN METABOLIC PANEL: CPT

## 2020-05-02 PROCEDURE — 99284 EMERGENCY DEPT VISIT MOD MDM: CPT

## 2020-05-02 PROCEDURE — 96361 HYDRATE IV INFUSION ADD-ON: CPT

## 2020-05-02 PROCEDURE — G0382 LEV 3 HOSP TYPE B ED VISIT: HCPCS | Performed by: PHYSICIAN ASSISTANT

## 2020-05-02 PROCEDURE — 70450 CT HEAD/BRAIN W/O DYE: CPT

## 2020-05-02 PROCEDURE — 93005 ELECTROCARDIOGRAM TRACING: CPT

## 2020-05-02 PROCEDURE — 71275 CT ANGIOGRAPHY CHEST: CPT

## 2020-05-02 PROCEDURE — 85610 PROTHROMBIN TIME: CPT

## 2020-05-02 PROCEDURE — 93005 ELECTROCARDIOGRAM TRACING: CPT | Performed by: PHYSICIAN ASSISTANT

## 2020-05-02 PROCEDURE — 85730 THROMBOPLASTIN TIME PARTIAL: CPT

## 2020-05-02 PROCEDURE — 96374 THER/PROPH/DIAG INJ IV PUSH: CPT

## 2020-05-02 PROCEDURE — 83735 ASSAY OF MAGNESIUM: CPT

## 2020-05-02 PROCEDURE — 70491 CT SOFT TISSUE NECK W/DYE: CPT

## 2020-05-02 RX ORDER — AMOXICILLIN 500 MG/1
500 TABLET, FILM COATED ORAL 2 TIMES DAILY
COMMUNITY
End: 2020-05-15

## 2020-05-02 RX ORDER — DULOXETIN HYDROCHLORIDE 30 MG/1
30 CAPSULE, DELAYED RELEASE ORAL DAILY
COMMUNITY
Start: 2020-04-27 | End: 2020-08-26 | Stop reason: SDUPTHER

## 2020-05-02 RX ORDER — AMLODIPINE BESYLATE 10 MG/1
10 TABLET ORAL DAILY
COMMUNITY
Start: 2020-04-09 | End: 2020-10-20

## 2020-05-02 RX ORDER — DIAZEPAM 2 MG/1
TABLET ORAL
COMMUNITY
Start: 2020-04-09 | End: 2020-05-15

## 2020-05-02 RX ORDER — FAMOTIDINE 20 MG/1
TABLET, FILM COATED ORAL
COMMUNITY
Start: 2020-02-11 | End: 2020-05-05

## 2020-05-02 RX ORDER — LORAZEPAM 2 MG/ML
0.5 INJECTION INTRAMUSCULAR ONCE
Status: COMPLETED | OUTPATIENT
Start: 2020-05-02 | End: 2020-05-02

## 2020-05-02 RX ADMIN — LORAZEPAM 0.5 MG: 2 INJECTION INTRAMUSCULAR; INTRAVENOUS at 14:40

## 2020-05-02 RX ADMIN — SODIUM CHLORIDE 1000 ML: 0.9 INJECTION, SOLUTION INTRAVENOUS at 14:39

## 2020-05-02 RX ADMIN — IOHEXOL 100 ML: 350 INJECTION, SOLUTION INTRAVENOUS at 16:10

## 2020-05-03 LAB — SARS-COV-2 RNA RESP QL NAA+PROBE: NEGATIVE

## 2020-05-04 ENCOUNTER — TELEPHONE (OUTPATIENT)
Dept: GASTROENTEROLOGY | Facility: AMBULARY SURGERY CENTER | Age: 50
End: 2020-05-04

## 2020-05-04 LAB
ATRIAL RATE: 125 BPM
ATRIAL RATE: 131 BPM
P AXIS: 49 DEGREES
P AXIS: 54 DEGREES
PR INTERVAL: 140 MS
PR INTERVAL: 144 MS
QRS AXIS: 11 DEGREES
QRS AXIS: 8 DEGREES
QRSD INTERVAL: 70 MS
QRSD INTERVAL: 74 MS
QT INTERVAL: 282 MS
QT INTERVAL: 302 MS
QTC INTERVAL: 416 MS
QTC INTERVAL: 435 MS
T WAVE AXIS: 11 DEGREES
T WAVE AXIS: 11 DEGREES
VENTRICULAR RATE: 125 BPM
VENTRICULAR RATE: 131 BPM

## 2020-05-04 PROCEDURE — 93010 ELECTROCARDIOGRAM REPORT: CPT | Performed by: INTERNAL MEDICINE

## 2020-05-05 ENCOUNTER — OFFICE VISIT (OUTPATIENT)
Dept: OTOLARYNGOLOGY | Facility: CLINIC | Age: 50
End: 2020-05-05
Payer: COMMERCIAL

## 2020-05-05 VITALS
WEIGHT: 191 LBS | HEIGHT: 64 IN | SYSTOLIC BLOOD PRESSURE: 142 MMHG | DIASTOLIC BLOOD PRESSURE: 94 MMHG | BODY MASS INDEX: 32.61 KG/M2

## 2020-05-05 DIAGNOSIS — Z72.0 TOBACCO USE: ICD-10-CM

## 2020-05-05 DIAGNOSIS — K21.9 GASTROESOPHAGEAL REFLUX DISEASE, ESOPHAGITIS PRESENCE NOT SPECIFIED: Primary | ICD-10-CM

## 2020-05-05 DIAGNOSIS — K21.9 LARYNGOPHARYNGEAL REFLUX (LPR): ICD-10-CM

## 2020-05-05 PROCEDURE — 99214 OFFICE O/P EST MOD 30 MIN: CPT | Performed by: OTOLARYNGOLOGY

## 2020-05-05 RX ORDER — OMEPRAZOLE 40 MG/1
40 CAPSULE, DELAYED RELEASE ORAL DAILY
Qty: 30 CAPSULE | Refills: 2 | Status: SHIPPED | OUTPATIENT
Start: 2020-05-05 | End: 2020-06-02 | Stop reason: SDUPTHER

## 2020-05-05 RX ORDER — FAMOTIDINE 40 MG/1
40 TABLET, FILM COATED ORAL
Qty: 30 TABLET | Refills: 2 | Status: SHIPPED | OUTPATIENT
Start: 2020-05-05 | End: 2020-08-04

## 2020-05-08 PROBLEM — H25.13 AGE-RELATED NUCLEAR CATARACT, BILATERAL: Status: ACTIVE | Noted: 2020-02-07

## 2020-05-08 PROBLEM — K42.9 UMBILICAL HERNIA: Status: ACTIVE | Noted: 2020-05-08

## 2020-05-08 PROBLEM — M50.30 DEGENERATION OF INTERVERTEBRAL DISC OF CERVICAL REGION: Status: ACTIVE | Noted: 2020-04-13

## 2020-05-08 PROBLEM — M94.0 TIETZE SYNDROME: Status: ACTIVE | Noted: 2020-03-30

## 2020-05-08 PROBLEM — N83.209 OVARIAN CYST: Status: ACTIVE | Noted: 2020-05-08

## 2020-05-08 PROBLEM — J38.3: Status: ACTIVE | Noted: 2019-03-12

## 2020-05-08 PROBLEM — R92.8 ABNORMALITY OF RIGHT BREAST ON SCREENING MAMMOGRAM: Status: ACTIVE | Noted: 2020-01-27

## 2020-05-08 PROBLEM — R13.10 DYSPHAGIA: Status: ACTIVE | Noted: 2020-05-02

## 2020-05-08 PROBLEM — I10 HIGH BLOOD PRESSURE: Status: ACTIVE | Noted: 2019-11-13

## 2020-05-08 PROBLEM — B97.7 HPV (HUMAN PAPILLOMA VIRUS) INFECTION: Status: ACTIVE | Noted: 2019-02-26

## 2020-05-08 PROBLEM — H52.4 PRESBYOPIA: Status: ACTIVE | Noted: 2020-02-07

## 2020-05-08 PROBLEM — R00.0 TACHYCARDIA: Status: ACTIVE | Noted: 2020-05-02

## 2020-05-08 PROBLEM — R31.9 BLOOD IN URINE: Status: ACTIVE | Noted: 2019-03-27

## 2020-05-08 PROBLEM — D21.9 FIBROIDS: Status: ACTIVE | Noted: 2020-05-08

## 2020-05-08 PROBLEM — J32.9 SINUS INFECTION: Status: ACTIVE | Noted: 2020-04-27

## 2020-05-08 PROBLEM — R07.89 COSTOCHONDRAL CHEST PAIN: Status: ACTIVE | Noted: 2020-03-30

## 2020-05-08 PROBLEM — Z98.890 H/O COLPOSCOPY WITH CERVICAL BIOPSY: Status: ACTIVE | Noted: 2019-03-27

## 2020-05-11 ENCOUNTER — TELEPHONE (OUTPATIENT)
Dept: GASTROENTEROLOGY | Facility: CLINIC | Age: 50
End: 2020-05-11

## 2020-05-12 ENCOUNTER — PREP FOR PROCEDURE (OUTPATIENT)
Dept: GASTROENTEROLOGY | Facility: CLINIC | Age: 50
End: 2020-05-12

## 2020-05-12 DIAGNOSIS — Z11.59 SCREENING FOR VIRAL DISEASE: ICD-10-CM

## 2020-05-12 DIAGNOSIS — R13.14 PHARYNGOESOPHAGEAL DYSPHAGIA: Primary | ICD-10-CM

## 2020-05-15 ENCOUNTER — TELEMEDICINE (OUTPATIENT)
Dept: INTERNAL MEDICINE CLINIC | Facility: CLINIC | Age: 50
End: 2020-05-15
Payer: COMMERCIAL

## 2020-05-15 ENCOUNTER — TELEPHONE (OUTPATIENT)
Dept: ADMINISTRATIVE | Facility: OTHER | Age: 50
End: 2020-05-15

## 2020-05-15 VITALS — HEIGHT: 64 IN | BODY MASS INDEX: 32.44 KG/M2 | WEIGHT: 190 LBS

## 2020-05-15 DIAGNOSIS — M50.30 DEGENERATIVE CERVICAL DISC: ICD-10-CM

## 2020-05-15 DIAGNOSIS — K21.9 GASTROESOPHAGEAL REFLUX DISEASE WITHOUT ESOPHAGITIS: ICD-10-CM

## 2020-05-15 DIAGNOSIS — Z72.0 TOBACCO USE: ICD-10-CM

## 2020-05-15 DIAGNOSIS — I73.00 RAYNAUD'S DISEASE WITHOUT GANGRENE: ICD-10-CM

## 2020-05-15 DIAGNOSIS — Z12.11 SCREENING FOR COLON CANCER: ICD-10-CM

## 2020-05-15 DIAGNOSIS — Q35.9 CLEFT PALATE: ICD-10-CM

## 2020-05-15 DIAGNOSIS — R13.10 DYSPHAGIA, UNSPECIFIED TYPE: Primary | ICD-10-CM

## 2020-05-15 PROBLEM — R07.89 COSTOCHONDRAL CHEST PAIN: Status: RESOLVED | Noted: 2020-03-30 | Resolved: 2020-05-15

## 2020-05-15 PROBLEM — J32.9 SINUS INFECTION: Status: RESOLVED | Noted: 2020-04-27 | Resolved: 2020-05-15

## 2020-05-15 PROBLEM — K64.9 HEMORRHOIDS: Status: RESOLVED | Noted: 2019-10-02 | Resolved: 2020-05-15

## 2020-05-15 PROBLEM — R35.0 URINARY FREQUENCY: Status: RESOLVED | Noted: 2019-04-11 | Resolved: 2020-05-15

## 2020-05-15 PROCEDURE — 99202 OFFICE O/P NEW SF 15 MIN: CPT | Performed by: NURSE PRACTITIONER

## 2020-05-26 ENCOUNTER — TELEPHONE (OUTPATIENT)
Dept: NEUROSURGERY | Facility: CLINIC | Age: 50
End: 2020-05-26

## 2020-05-27 ENCOUNTER — OFFICE VISIT (OUTPATIENT)
Dept: NEUROSURGERY | Facility: CLINIC | Age: 50
End: 2020-05-27
Payer: COMMERCIAL

## 2020-05-27 VITALS
TEMPERATURE: 98.3 F | WEIGHT: 193 LBS | BODY MASS INDEX: 32.95 KG/M2 | HEART RATE: 122 BPM | DIASTOLIC BLOOD PRESSURE: 87 MMHG | SYSTOLIC BLOOD PRESSURE: 131 MMHG | RESPIRATION RATE: 16 BRPM | HEIGHT: 64 IN

## 2020-05-27 DIAGNOSIS — M50.30 DEGENERATION OF INTERVERTEBRAL DISC OF CERVICAL REGION: Primary | ICD-10-CM

## 2020-05-27 DIAGNOSIS — M50.30 DEGENERATIVE CERVICAL DISC: ICD-10-CM

## 2020-05-27 DIAGNOSIS — M47.812 SPONDYLOSIS OF CERVICAL REGION WITHOUT MYELOPATHY OR RADICULOPATHY: ICD-10-CM

## 2020-05-27 PROBLEM — M48.02 CERVICAL STENOSIS OF SPINE: Status: ACTIVE | Noted: 2020-05-27

## 2020-05-27 PROCEDURE — 3075F SYST BP GE 130 - 139MM HG: CPT | Performed by: NURSE PRACTITIONER

## 2020-05-27 PROCEDURE — 3008F BODY MASS INDEX DOCD: CPT | Performed by: NURSE PRACTITIONER

## 2020-05-27 PROCEDURE — 99204 OFFICE O/P NEW MOD 45 MIN: CPT | Performed by: NURSE PRACTITIONER

## 2020-05-27 PROCEDURE — 3079F DIAST BP 80-89 MM HG: CPT | Performed by: NURSE PRACTITIONER

## 2020-06-01 ENCOUNTER — OFFICE VISIT (OUTPATIENT)
Dept: FAMILY MEDICINE CLINIC | Facility: CLINIC | Age: 50
End: 2020-06-01
Payer: COMMERCIAL

## 2020-06-01 VITALS
SYSTOLIC BLOOD PRESSURE: 148 MMHG | DIASTOLIC BLOOD PRESSURE: 88 MMHG | RESPIRATION RATE: 24 BRPM | WEIGHT: 190 LBS | HEIGHT: 63 IN | BODY MASS INDEX: 33.66 KG/M2 | HEART RATE: 88 BPM

## 2020-06-01 DIAGNOSIS — M94.0 COSTOCHONDRITIS: Primary | ICD-10-CM

## 2020-06-01 DIAGNOSIS — M94.0 COSTAL CHONDRITIS: ICD-10-CM

## 2020-06-01 PROCEDURE — 3077F SYST BP >= 140 MM HG: CPT | Performed by: FAMILY MEDICINE

## 2020-06-01 PROCEDURE — 3008F BODY MASS INDEX DOCD: CPT | Performed by: FAMILY MEDICINE

## 2020-06-01 PROCEDURE — 99213 OFFICE O/P EST LOW 20 MIN: CPT | Performed by: FAMILY MEDICINE

## 2020-06-01 PROCEDURE — 3079F DIAST BP 80-89 MM HG: CPT | Performed by: FAMILY MEDICINE

## 2020-06-01 RX ORDER — PREDNISONE 10 MG/1
10 TABLET ORAL 3 TIMES DAILY
Status: DISCONTINUED | OUTPATIENT
Start: 2020-06-01 | End: 2020-06-02

## 2020-06-02 ENCOUNTER — EVALUATION (OUTPATIENT)
Dept: PHYSICAL THERAPY | Facility: CLINIC | Age: 50
End: 2020-06-02
Payer: COMMERCIAL

## 2020-06-02 ENCOUNTER — APPOINTMENT (OUTPATIENT)
Dept: LAB | Facility: HOSPITAL | Age: 50
End: 2020-06-02
Attending: FAMILY MEDICINE
Payer: COMMERCIAL

## 2020-06-02 DIAGNOSIS — M94.0 COSTOCHONDRITIS: Primary | ICD-10-CM

## 2020-06-02 DIAGNOSIS — M50.30 DEGENERATION OF CERVICAL INTERVERTEBRAL DISC: Primary | ICD-10-CM

## 2020-06-02 DIAGNOSIS — K21.9 GASTROESOPHAGEAL REFLUX DISEASE, ESOPHAGITIS PRESENCE NOT SPECIFIED: ICD-10-CM

## 2020-06-02 DIAGNOSIS — M47.812 CERVICAL SPONDYLOSIS WITHOUT MYELOPATHY: ICD-10-CM

## 2020-06-02 LAB
BASOPHILS # BLD AUTO: 0.07 THOUSANDS/ΜL (ref 0–0.1)
BASOPHILS NFR BLD AUTO: 1 % (ref 0–1)
EOSINOPHIL # BLD AUTO: 0.09 THOUSAND/ΜL (ref 0–0.61)
EOSINOPHIL NFR BLD AUTO: 1 % (ref 0–6)
ERYTHROCYTE [DISTWIDTH] IN BLOOD BY AUTOMATED COUNT: 13.2 % (ref 11.6–15.1)
ERYTHROCYTE [SEDIMENTATION RATE] IN BLOOD: 20 MM/HOUR (ref 0–20)
HCT VFR BLD AUTO: 42.2 % (ref 34.8–46.1)
HGB BLD-MCNC: 14.3 G/DL (ref 11.5–15.4)
IMM GRANULOCYTES # BLD AUTO: 0.01 THOUSAND/UL (ref 0–0.2)
IMM GRANULOCYTES NFR BLD AUTO: 0 % (ref 0–2)
LYMPHOCYTES # BLD AUTO: 1.92 THOUSANDS/ΜL (ref 0.6–4.47)
LYMPHOCYTES NFR BLD AUTO: 29 % (ref 14–44)
MCH RBC QN AUTO: 31.4 PG (ref 26.8–34.3)
MCHC RBC AUTO-ENTMCNC: 33.9 G/DL (ref 31.4–37.4)
MCV RBC AUTO: 93 FL (ref 82–98)
MONOCYTES # BLD AUTO: 0.48 THOUSAND/ΜL (ref 0.17–1.22)
MONOCYTES NFR BLD AUTO: 7 % (ref 4–12)
NEUTROPHILS # BLD AUTO: 4.15 THOUSANDS/ΜL (ref 1.85–7.62)
NEUTS SEG NFR BLD AUTO: 62 % (ref 43–75)
NRBC BLD AUTO-RTO: 0 /100 WBCS
PLATELET # BLD AUTO: 226 THOUSANDS/UL (ref 149–390)
PMV BLD AUTO: 10.7 FL (ref 8.9–12.7)
RBC # BLD AUTO: 4.56 MILLION/UL (ref 3.81–5.12)
WBC # BLD AUTO: 6.72 THOUSAND/UL (ref 4.31–10.16)

## 2020-06-02 PROCEDURE — 85025 COMPLETE CBC W/AUTO DIFF WBC: CPT

## 2020-06-02 PROCEDURE — 86430 RHEUMATOID FACTOR TEST QUAL: CPT

## 2020-06-02 PROCEDURE — 85652 RBC SED RATE AUTOMATED: CPT

## 2020-06-02 PROCEDURE — 97162 PT EVAL MOD COMPLEX 30 MIN: CPT

## 2020-06-02 PROCEDURE — 86618 LYME DISEASE ANTIBODY: CPT

## 2020-06-02 PROCEDURE — 36415 COLL VENOUS BLD VENIPUNCTURE: CPT

## 2020-06-02 RX ORDER — OMEPRAZOLE 20 MG/1
20 CAPSULE, DELAYED RELEASE ORAL DAILY
Qty: 90 CAPSULE | Refills: 0 | Status: SHIPPED | OUTPATIENT
Start: 2020-06-02 | End: 2020-08-26 | Stop reason: SDUPTHER

## 2020-06-02 RX ORDER — OMEPRAZOLE 40 MG/1
40 CAPSULE, DELAYED RELEASE ORAL DAILY
Qty: 30 CAPSULE | Refills: 2 | Status: SHIPPED | OUTPATIENT
Start: 2020-06-02 | End: 2020-06-02

## 2020-06-02 RX ORDER — PREDNISONE 10 MG/1
10 TABLET ORAL 2 TIMES DAILY WITH MEALS
Qty: 20 TABLET | Refills: 0 | Status: SHIPPED | OUTPATIENT
Start: 2020-06-02 | End: 2020-06-12

## 2020-06-03 LAB — RHEUMATOID FACT SER QL LA: NEGATIVE

## 2020-06-04 ENCOUNTER — TELEPHONE (OUTPATIENT)
Dept: OBGYN CLINIC | Facility: HOSPITAL | Age: 50
End: 2020-06-04

## 2020-06-04 LAB — B BURGDOR IGG+IGM SER-ACNC: <0.91 ISR (ref 0–0.9)

## 2020-06-05 ENCOUNTER — TELEPHONE (OUTPATIENT)
Dept: GASTROENTEROLOGY | Facility: CLINIC | Age: 50
End: 2020-06-05

## 2020-06-05 ENCOUNTER — OFFICE VISIT (OUTPATIENT)
Dept: RHEUMATOLOGY | Facility: CLINIC | Age: 50
End: 2020-06-05
Payer: COMMERCIAL

## 2020-06-05 VITALS
WEIGHT: 190 LBS | SYSTOLIC BLOOD PRESSURE: 160 MMHG | BODY MASS INDEX: 33.66 KG/M2 | HEIGHT: 63 IN | DIASTOLIC BLOOD PRESSURE: 80 MMHG | TEMPERATURE: 98.7 F

## 2020-06-05 DIAGNOSIS — M94.0 TIETZE SYNDROME: Primary | ICD-10-CM

## 2020-06-05 DIAGNOSIS — M50.30 DEGENERATIVE CERVICAL DISC: ICD-10-CM

## 2020-06-05 DIAGNOSIS — M62.838 MUSCLE SPASM: ICD-10-CM

## 2020-06-05 DIAGNOSIS — I73.00 RAYNAUD'S DISEASE WITHOUT GANGRENE: ICD-10-CM

## 2020-06-05 PROCEDURE — 99244 OFF/OP CNSLTJ NEW/EST MOD 40: CPT | Performed by: INTERNAL MEDICINE

## 2020-06-05 RX ORDER — CYCLOBENZAPRINE HCL 10 MG
10 TABLET ORAL
Qty: 30 TABLET | Refills: 3 | Status: SHIPPED | OUTPATIENT
Start: 2020-06-05 | End: 2020-09-14 | Stop reason: SDUPTHER

## 2020-06-05 RX ORDER — CELECOXIB 100 MG/1
100 CAPSULE ORAL 2 TIMES DAILY
Qty: 60 CAPSULE | Refills: 3 | Status: SHIPPED | OUTPATIENT
Start: 2020-06-05 | End: 2020-09-14 | Stop reason: SDUPTHER

## 2020-06-08 ENCOUNTER — OFFICE VISIT (OUTPATIENT)
Dept: PHYSICAL THERAPY | Facility: CLINIC | Age: 50
End: 2020-06-08
Payer: COMMERCIAL

## 2020-06-08 DIAGNOSIS — M50.30 DEGENERATION OF CERVICAL INTERVERTEBRAL DISC: Primary | ICD-10-CM

## 2020-06-08 DIAGNOSIS — M47.812 CERVICAL SPONDYLOSIS WITHOUT MYELOPATHY: ICD-10-CM

## 2020-06-08 PROCEDURE — 97140 MANUAL THERAPY 1/> REGIONS: CPT

## 2020-06-08 PROCEDURE — 97110 THERAPEUTIC EXERCISES: CPT

## 2020-06-12 ENCOUNTER — OFFICE VISIT (OUTPATIENT)
Dept: PHYSICAL THERAPY | Facility: CLINIC | Age: 50
End: 2020-06-12
Payer: COMMERCIAL

## 2020-06-12 DIAGNOSIS — M50.30 DEGENERATION OF CERVICAL INTERVERTEBRAL DISC: Primary | ICD-10-CM

## 2020-06-12 DIAGNOSIS — M47.812 CERVICAL SPONDYLOSIS WITHOUT MYELOPATHY: ICD-10-CM

## 2020-06-12 PROCEDURE — 97110 THERAPEUTIC EXERCISES: CPT

## 2020-06-12 PROCEDURE — 97140 MANUAL THERAPY 1/> REGIONS: CPT

## 2020-06-15 ENCOUNTER — OFFICE VISIT (OUTPATIENT)
Dept: PHYSICAL THERAPY | Facility: CLINIC | Age: 50
End: 2020-06-15
Payer: COMMERCIAL

## 2020-06-15 DIAGNOSIS — M47.812 CERVICAL SPONDYLOSIS WITHOUT MYELOPATHY: ICD-10-CM

## 2020-06-15 DIAGNOSIS — M50.30 DEGENERATION OF CERVICAL INTERVERTEBRAL DISC: Primary | ICD-10-CM

## 2020-06-15 PROCEDURE — 97110 THERAPEUTIC EXERCISES: CPT | Performed by: PHYSICAL THERAPIST

## 2020-06-15 PROCEDURE — 97140 MANUAL THERAPY 1/> REGIONS: CPT | Performed by: PHYSICAL THERAPIST

## 2020-06-18 ENCOUNTER — OFFICE VISIT (OUTPATIENT)
Dept: PHYSICAL THERAPY | Facility: CLINIC | Age: 50
End: 2020-06-18
Payer: COMMERCIAL

## 2020-06-18 DIAGNOSIS — M47.812 CERVICAL SPONDYLOSIS WITHOUT MYELOPATHY: ICD-10-CM

## 2020-06-18 DIAGNOSIS — M50.30 DEGENERATION OF CERVICAL INTERVERTEBRAL DISC: Primary | ICD-10-CM

## 2020-06-18 PROCEDURE — 97140 MANUAL THERAPY 1/> REGIONS: CPT

## 2020-06-18 PROCEDURE — 97110 THERAPEUTIC EXERCISES: CPT

## 2020-06-22 ENCOUNTER — OFFICE VISIT (OUTPATIENT)
Dept: PHYSICAL THERAPY | Facility: CLINIC | Age: 50
End: 2020-06-22
Payer: COMMERCIAL

## 2020-06-22 DIAGNOSIS — M50.30 DEGENERATION OF CERVICAL INTERVERTEBRAL DISC: Primary | ICD-10-CM

## 2020-06-22 PROCEDURE — 97140 MANUAL THERAPY 1/> REGIONS: CPT

## 2020-06-22 PROCEDURE — 97110 THERAPEUTIC EXERCISES: CPT

## 2020-06-25 ENCOUNTER — OFFICE VISIT (OUTPATIENT)
Dept: PHYSICAL THERAPY | Facility: CLINIC | Age: 50
End: 2020-06-25
Payer: COMMERCIAL

## 2020-06-25 ENCOUNTER — DOCUMENTATION (OUTPATIENT)
Dept: OTHER | Facility: HOSPITAL | Age: 50
End: 2020-06-25

## 2020-06-25 DIAGNOSIS — M50.30 DEGENERATION OF CERVICAL INTERVERTEBRAL DISC: Primary | ICD-10-CM

## 2020-06-25 DIAGNOSIS — M47.812 CERVICAL SPONDYLOSIS WITHOUT MYELOPATHY: ICD-10-CM

## 2020-06-25 DIAGNOSIS — Z20.822 ENCOUNTER FOR LABORATORY TESTING FOR COVID-19 VIRUS: Primary | ICD-10-CM

## 2020-06-25 PROCEDURE — 97140 MANUAL THERAPY 1/> REGIONS: CPT | Performed by: PHYSICAL THERAPIST

## 2020-06-25 PROCEDURE — 97110 THERAPEUTIC EXERCISES: CPT | Performed by: PHYSICAL THERAPIST

## 2020-06-29 ENCOUNTER — OFFICE VISIT (OUTPATIENT)
Dept: PHYSICAL THERAPY | Facility: CLINIC | Age: 50
End: 2020-06-29
Payer: COMMERCIAL

## 2020-06-29 DIAGNOSIS — M50.30 DEGENERATION OF CERVICAL INTERVERTEBRAL DISC: Primary | ICD-10-CM

## 2020-06-29 DIAGNOSIS — M47.812 CERVICAL SPONDYLOSIS WITHOUT MYELOPATHY: ICD-10-CM

## 2020-06-29 PROCEDURE — 97140 MANUAL THERAPY 1/> REGIONS: CPT

## 2020-06-29 PROCEDURE — 97110 THERAPEUTIC EXERCISES: CPT

## 2020-07-02 ENCOUNTER — OFFICE VISIT (OUTPATIENT)
Dept: PHYSICAL THERAPY | Facility: CLINIC | Age: 50
End: 2020-07-02
Payer: COMMERCIAL

## 2020-07-02 DIAGNOSIS — M50.30 DEGENERATION OF CERVICAL INTERVERTEBRAL DISC: Primary | ICD-10-CM

## 2020-07-02 PROCEDURE — 97140 MANUAL THERAPY 1/> REGIONS: CPT

## 2020-07-02 PROCEDURE — 97110 THERAPEUTIC EXERCISES: CPT

## 2020-07-02 NOTE — PROGRESS NOTES
Daily Note     Today's date: 2020  Patient name: Leigh Rodríguez  : 1970  MRN: [id number]  Referring provider: Kathy Montilla MD  Dx:   Encounter Diagnosis     ICD-10-CM    1  Degeneration of cervical intervertebral disc M50 30        Start Time: 1500  Stop Time: 1555  Total time in clinic (min): 55 minutes    Subjective:  Pt  states pain level @ cerv region is = 3-4/10 today  Says she was most likely doing too much the past couple days, which resulted in today's sx  Objective: See treatment diary below      Assessment: Tolerated treatment Fairly Well to Well with performance of all ther exer  Pt  Feels she is improving with PT intervention thus far  Plan: Con't services 2x/week as per POC/Goals            Precautions: anxiety, costochondritis/Tietze syndrome    Re-eval Date: 20    Date 20 7 2 20    Visit Count 6 7 8 9    FOTO   completed     Pain In 3/10  Chest tightness/soreness 3/10 chest pain  Pressure 10 2/10 pain/pressure 3-4/10    Pain Out "Less" sx "a little better"  10            Manuals 20 7 2 20    MFR  25 min 25' 25' 20 min    PROM c-spine        UT stretch 2x1 min 2x1 min 2x1 min 2x1 min            Neuro Re-Ed                                                                Ther Ex  7 2 20    cerv ROM w/ERS 5 x 10" 5x10" 5x10" 7x10"    shld rolls  Fwd rev 20x ea X 20 ea 20 ea 25x ea    Supine chin tucks w/towel 15x3" 3" x 15  15 x 5"  seated 20 x 5"  seated    MTPs/LTPs    **NV    Towel roll along thoracic spine with UE HA/flex 2x1 min  2x1 min 2x 1min      Rpm  7' alt 100 rpm   8' alt  70 rpm   8' alt 70 rpm   8' alt    scap retraction 15 x 5" 5" x 20  20x5" 20x5"    Shld flex/abd to 90* 15x ea X 15 ea full ROM to farzaneh  20x ea full ROM to farzaneh 20x ea full ROM to farzaneh    Ther Activity                        Gait Training                        Modalities        US to B post cerv area  1 2w/cm2        HP to c-spine  CP to sternal area    **Declined [facility] offer today

## 2020-07-07 ENCOUNTER — OFFICE VISIT (OUTPATIENT)
Dept: PHYSICAL THERAPY | Facility: CLINIC | Age: 50
End: 2020-07-07
Payer: COMMERCIAL

## 2020-07-07 DIAGNOSIS — M47.812 CERVICAL SPONDYLOSIS WITHOUT MYELOPATHY: ICD-10-CM

## 2020-07-07 DIAGNOSIS — M50.30 DEGENERATION OF CERVICAL INTERVERTEBRAL DISC: Primary | ICD-10-CM

## 2020-07-07 PROCEDURE — 97110 THERAPEUTIC EXERCISES: CPT

## 2020-07-07 NOTE — PROGRESS NOTES
Daily Note     Today's date: 2020  Patient name: Rosana Dave  : 1970  MRN: [id number]  Referring provider: Nikki Azul MD  Dx:   Encounter Diagnosis     ICD-10-CM    1  Degeneration of cervical intervertebral disc M50 30    2  Cervical spondylosis without myelopathy M47 812                   Subjective: Pt states she has no pain in posterior neck and tightness in front of neck only occurs with eating certain foods  Notes a signif decrease in costochondritis symptoms as well  Objective: See treatment diary below      Assessment: Tolerated treatment well  Patient exhibited good technique with therapeutic exercises and would benefit from continued PT  Pt responding positively to tx thus far  Plan: Continue per plan of care        Precautions: anxiety, costochondritis/Tietze syndrome    Re-eval Date: 20    Date 20 7 2 20 20   Visit Count 6 7 8 9 10   FOTO   completed     Pain In 3/10  Chest tightness/soreness 3/10 chest pain  Pressure 10 2/10 pain/pressure 3-4/10 0/10 neck   Pain Out "Less" sx "a little better"  1/10            Manuals 20 7 2 20 7   MFR  25 min 25' 25' 20 min held   PROM c-spine        UT stretch 2x1 min 2x1 min 2x1 min 2x1 min 2x1 min           Neuro Re-Ed                                                                Ther Ex  7 2 20 7   cerv ROM w/ERS 5 x 10" 5x10" 5x10" 7x10" 7 x 10"   shld rolls  Fwd rev 20x ea X 20 ea 20 ea 25x ea 25x ea   Supine chin tucks w/towel 15x3" 3" x 15  15 x 5"  seated 20 x 5"  seated 20x5"   MTPs/LTPs    **[place] 20 ea   Towel roll along thoracic spine with UE HA/flex 2x1 min  2x1 min 2x 1min 2 min     Rpm  7' alt 100 rpm   8' alt  70 rpm   8' alt 70 rpm   8' alt 70 rpm  8' alt   scap retraction 15 x 5" 5" x 20  20x5" 20x5" 20x5"   pec stretch  90/145     4x20" ea   Shld flex/abd to 90* 15x ea X 15 ea full ROM to farzaneh  20x ea full ROM to farzaneh 20x ea full ROM to farzaneh 20x ea full ROM Ther Activity                        Gait Training                        Modalities        US to B post cerv area  1 2w/cm2        HP to c-spine  CP to sternal area    **Declined [facility] offer today

## 2020-07-08 ENCOUNTER — OFFICE VISIT (OUTPATIENT)
Dept: PHYSICAL THERAPY | Facility: CLINIC | Age: 50
End: 2020-07-08
Payer: COMMERCIAL

## 2020-07-08 DIAGNOSIS — M50.30 DEGENERATION OF CERVICAL INTERVERTEBRAL DISC: Primary | ICD-10-CM

## 2020-07-08 PROCEDURE — 97110 THERAPEUTIC EXERCISES: CPT

## 2020-07-08 PROCEDURE — 97140 MANUAL THERAPY 1/> REGIONS: CPT

## 2020-07-08 NOTE — PROGRESS NOTES
Daily Note     Today's date: 2020  Patient name: Miguel Graham  : 1970  MRN: [id number]  Referring provider: Ree Gregg MD  Dx:   Encounter Diagnosis     ICD-10-CM    1  Degeneration of cervical intervertebral disc M50 30                   Subjective: I RTD   Occasionally still have difficulty swallowing        Objective: See treatment diary below      Assessment: Tolerated treatment well  Denied any increase pain/sx's or co's of difficulty swallowing with MT/TE this date  Pt progressed with increase resistance to pt tolerance   Patient would benefit from continued PT      Plan: Continue per plan of care        Precautions: anxiety, costochondritis/Tietze syndrome    Re-eval Date: 20    Date        Visit Count 11       FOTO        Pain In 2 L cspine       Pain Out 0               Manuals        MFR  held       PROM c-spine Total 10 min       UT stretch 2x1 min               Neuro Re-Ed                                                                Ther Ex        cerv ROM w/ERS 7 x 10"       shld rolls  Fwd rev 25x ea       Supine chin tucks w/towel 20x5"       MTPs/LTPs Green   3x10 ea 5"       Towel roll along thoracic spine with UE HA/flex 2 min       UBE 60 rpm  8' alt       scap retraction 20x5"       pec stretch  90/145 4x20" ea       Shld flex/abd to 90* 1# DB  20x ea full ROM       Ther Activity                        Gait Training                        Modalities        US to B post cerv area  1 2w/cm2        HP to c-spine  CP to sternal area

## 2020-07-13 ENCOUNTER — EVALUATION (OUTPATIENT)
Dept: PHYSICAL THERAPY | Facility: CLINIC | Age: 50
End: 2020-07-13
Payer: COMMERCIAL

## 2020-07-13 DIAGNOSIS — M50.30 DEGENERATION OF CERVICAL INTERVERTEBRAL DISC: Primary | ICD-10-CM

## 2020-07-13 DIAGNOSIS — M47.812 CERVICAL SPONDYLOSIS WITHOUT MYELOPATHY: ICD-10-CM

## 2020-07-13 PROCEDURE — 97164 PT RE-EVAL EST PLAN CARE: CPT

## 2020-07-13 PROCEDURE — 97110 THERAPEUTIC EXERCISES: CPT

## 2020-07-13 NOTE — PROGRESS NOTES
PT Re-Evaluation  and PT Discharge    Today's date: 2020  Patient name: Mariaa Couch  : 1970  MRN: [id number]  Referring provider: Jose De Jesus Wood MD  Dx:   Encounter Diagnosis     ICD-10-CM    1  Degeneration of cervical intervertebral disc M50 30    2  Cervical spondylosis without myelopathy M47 812                   Assessment  Assessment details: Mariaa Couch is a 48 y o  female presenting to outpatient physical therapy with diagnosis of Degeneration of cervical intervertebral disc  (primary encounter diagnosis)  Cervical spondylosis without myelopathy  Patient's current impairments include posterior neck stiffness and L>R cerv pain, impaired soft tissue mobility, reduced cerv  range of motion, reduced BUE  strength, reduced postural awareness, and reduced activity tolerance  Patient's present functional limitations include difficulty with ADLs with increased need for assistance, reliance on medication and/or modalities for pain relief, poor tolerance for functional mobility and activity, and difficulty completing work/school responsibilities  Patient to benefit from skilled outpatient physical therapy 2x/week for 4-6  weeks in order to reduce pain, maximize pain free range of motion, increase strength and stability, and improve functional mobility/functional activity in order to maximize return to prior level of function with reduced limitations  Will monitor pt's vital signs due to high BP and possible assoc cardiac symptoms  Thank you for your referral    UPDATE: Pt states she feels 99% better than at [facility] and feels she is ready for DC from PT  Pt states the only thing that bothers her is that her arms fatigue during ex  Pt states she can attend her local gym to cont to work on arm strength  States she has been able to lift and carry objects at home w/o increase in symptoms   Pt has met AROM and strength goals and therapist in agreement with DC from skilled services at this time     Impairments: abnormal or restricted ROM, activity intolerance, impaired physical strength, lacks appropriate home exercise program, pain with function and poor posture   Functional limitations: see NICOLE  Symptom irritability: highBarriers to therapy: Anxiety, costochondritis with Tietze syndrome  Understanding of Dx/Px/POC: good   Prognosis: good    Goals  STGs to be achieved in 4 weeks:  1  Pt to demonstrate reduced subjective pain rating "at worst" by at least 2-3 points from Initial Eval in order to allow for reduced pain with ADLs and improved functional activity tolerance  MET  2  Pt to demonstrate increased AROM of c-spine  by at least 5-10 degrees in order to allow for greater ease and independence with ADLs and functional mobility  MET  3  Pt to demonstrate full PROM of c-spine in order to maximize joint mobility and function and allow for progression of exercise program and achievement of goals  MET  4  Pt to demonstrate increased MMT of neck and BUEs by at least 1/2-1 grade in order to improve safety and stability with ADLs and functional mobility  MET    LTGs to be achieved in 6-8 weeks:  1  Pt will be I with HEP in order to continue to improve quality of life and independence and reduce risk for re-injury  MET  2  Pt to demonstrate return tosinging and full cerv ROM and [place] chores without limitations or restrictions  MET  3  Pt to demonstrate improved function as noted by achieving or exceeding predicted score on FOTO outcomes assessment tool  MET      Plan  Plan details: D C PT  Pt issued green theraband for HEP  Treatment plan discussed with: patient        Subjective Evaluation    History of Present Illness  Mechanism of injury: Pt began with cerv pain approx 3 months ago when she was also diagnosed with costochondritis/Tietze syndrome and pain and stiffness and redness rad from chest into neck  Pt notes swelling in neck constricting swallowing   Pt has had increased anxiety related to this condition  Pt's neck symptoms include tightness ant neck, L cerv pain, and stiffness in posterior neck  Pt having diff stretching, washing hair, sleeping, cannot lift or carry anything, states she eats in forward lean position  Diff singing and at times playing piano  Pt carries DJ and singing equipment and started with sx shortly after carrying 2 large TVs  Cannot wear tight clothes on upper body   UPDATE: Pt states she only has a 1/10 in her anterior neck and 1/10 in the chest area as well  No posterior cerv symptoms  States she feels 99% better than at [facility]  Pt feels she is ready for DC from PT at this time due to improved status  Not a recurrent problem   Quality of life: good    Pain  Current pain ratin (more pressure in chest and tightness in neck  )  At best pain ratin  At worst pain rating: 3  Quality: tight, discomfort and pressure  Relieving factors: change in position  Exacerbated by: eating  Progression: improved    Social Support  Steps to enter house: no  Stairs in house: no   Lives in: [facility]    Employment status: not working ([employer])  Hand dominance: right      Diagnostic Tests  X-ray: normal  Abnormal MRI: in 6 weeks  CT scan: abnormal (degen cerv discs)  Treatments  Previous treatment: medication (steroids for costochondritis)  Current treatment: medication and physical therapy  Patient Goals  Patient goals for therapy: decreased pain, increased motion, increased strength, independence with ADLs/IADLs and return to work  Patient goal: return to singing        Objective     Concurrent Complaints  Negative for disturbed sleep and headaches (every other day, feels sinus relared)    Postural Observations  Seated posture: good  Standing posture: good  Correction of posture: makes symptoms better        Tenderness   Cervical Spine   No tenderness in the left ribs/costal cartilage and right ribs/costal cartilage       Neurological Testing     Sensation   Cervical/Thoracic Left   Intact: light touch, hot/cold discrimination and proprioception    Right   Intact: light touch, hot/cold discrimination and proprioception    Active Range of Motion   Cervical/Thoracic Spine       Cervical    Flexion: 45 degrees   Extension: 35 degrees      Left lateral flexion: 40 degrees      Right lateral flexion: 48 degrees      Left rotation: 55 degrees  Right rotation: 55 degrees             Strength/Myotome Testing   Cervical Spine   Neck extension: 4-  Neck flexion: 3+    Left   Neck lateral flexion (C3): 4    Right   Neck lateral flexion (C3): 4    Left Shoulder     Planes of Motion   Flexion: 4+   Abduction: 5   External rotation at 0°: 5   Internal rotation at 0°: 5     Right Shoulder     Planes of Motion   Flexion: 4+   Abduction: 5   External rotation at 0°: 5   Internal rotation at 0°: 5     Left Elbow   Flexion: 5  Extension: 5    Right Elbow   Flexion: 5  Extension: 5    Tests   Cervical   Negative vertical compression and lumbar distraction  Thoracic   Negative slump test      Lumbar   Negative vertical compression  General Comments:    Upper quarter screen   Shoulder: unremarkable  Elbow: unremarkable  Hand/wrist: unremarkable  TMJ   Jaw observations: within normal limits  Neuro Exam:     Headaches   Patient reports headaches: No (every other day, feels sinus relared)         Flowsheet Rows      Most Recent Value   PT/OT G-Codes   Current Score  66   Projected Score  55             Precautions: anxiety, costochondritis/Tietze syndrome    Re-eval Date: 7/14/20    Date 7/8 7/13      Visit Count 11 12      FOTO  completed      Pain In 2 L cspine 1 ant neck/chest      Pain Out 0               Manuals  7/13      MFR  held       PROM c-spine Total 10 min       UT stretch 2x1 min               Neuro Re-Ed                                                                Ther Ex  7/13      cerv ROM w/ERS 7 x 10"       shld rolls  Fwd rev 25x ea 25x      Supine chin tucks w/towel 20x5" 20x5" MTPs/LTPs Green   3x10 ea 5" Green  30 x 5" ea      Towel roll along thoracic spine with UE HA/flex 2 min HEP      UBE 60 rpm  8' alt 80 rpm  10'      scap retraction 20x5" 20x5"      pec stretch  90/145 4x20" ea       Shld flex/abd to 90* 1# DB  20x ea full ROM       Ther Activity                        Gait Training                        Modalities        US to B post cerv area  1 2w/cm2        HP to c-spine  CP to sternal area

## 2020-07-16 ENCOUNTER — APPOINTMENT (OUTPATIENT)
Dept: PHYSICAL THERAPY | Facility: CLINIC | Age: 50
End: 2020-07-16
Payer: COMMERCIAL

## 2020-07-20 ENCOUNTER — APPOINTMENT (OUTPATIENT)
Dept: PHYSICAL THERAPY | Facility: CLINIC | Age: 50
End: 2020-07-20
Payer: COMMERCIAL

## 2020-07-21 ENCOUNTER — HOSPITAL ENCOUNTER (OUTPATIENT)
Dept: RADIOLOGY | Facility: HOSPITAL | Age: 50
Discharge: HOME | End: 2020-07-21
Attending: NEUROLOGICAL SURGERY
Payer: COMMERCIAL

## 2020-07-21 DIAGNOSIS — M47.812 SPONDYLOSIS OF CERVICAL REGION WITHOUT MYELOPATHY OR RADICULOPATHY: ICD-10-CM

## 2020-07-21 DIAGNOSIS — M50.30 DEGENERATION OF INTERVERTEBRAL DISC OF CERVICAL REGION: ICD-10-CM

## 2020-07-21 DIAGNOSIS — M50.30 DEGENERATIVE CERVICAL DISC: ICD-10-CM

## 2020-07-21 PROCEDURE — 72052 X-RAY EXAM NECK SPINE 6/>VWS: CPT

## 2020-07-23 ENCOUNTER — APPOINTMENT (OUTPATIENT)
Dept: PHYSICAL THERAPY | Facility: CLINIC | Age: 50
End: 2020-07-23
Payer: COMMERCIAL

## 2020-07-24 ENCOUNTER — TELEPHONE (OUTPATIENT)
Dept: NEUROSURGERY | Facility: CLINIC | Age: 50
End: 2020-07-24

## 2020-07-24 NOTE — TELEPHONE ENCOUNTER

## 2020-07-27 ENCOUNTER — OFFICE VISIT (OUTPATIENT)
Dept: NEUROSURGERY | Facility: CLINIC | Age: 50
End: 2020-07-27
Payer: COMMERCIAL

## 2020-07-27 VITALS
SYSTOLIC BLOOD PRESSURE: 136 MMHG | TEMPERATURE: 98.9 F | HEIGHT: 63 IN | WEIGHT: 192 LBS | HEART RATE: 122 BPM | DIASTOLIC BLOOD PRESSURE: 94 MMHG | BODY MASS INDEX: 34.02 KG/M2 | RESPIRATION RATE: 16 BRPM

## 2020-07-27 DIAGNOSIS — M50.30 DEGENERATIVE CERVICAL DISC: ICD-10-CM

## 2020-07-27 DIAGNOSIS — M94.0 TIETZE SYNDROME: Primary | ICD-10-CM

## 2020-07-27 DIAGNOSIS — M47.812 SPONDYLOSIS OF CERVICAL REGION WITHOUT MYELOPATHY OR RADICULOPATHY: ICD-10-CM

## 2020-07-27 PROCEDURE — 3008F BODY MASS INDEX DOCD: CPT | Performed by: PHYSICIAN ASSISTANT

## 2020-07-27 PROCEDURE — 99214 OFFICE O/P EST MOD 30 MIN: CPT | Performed by: PHYSICIAN ASSISTANT

## 2020-07-27 PROCEDURE — 3075F SYST BP GE 130 - 139MM HG: CPT | Performed by: PHYSICIAN ASSISTANT

## 2020-07-27 PROCEDURE — 3080F DIAST BP >= 90 MM HG: CPT | Performed by: PHYSICIAN ASSISTANT

## 2020-07-27 NOTE — PROGRESS NOTES
Neurosurgery Office Note  Levi Nunn 48 y o  female MRN: [id number]      Assessment/Plan     Spondylosis of cervical region without myelopathy or radiculopathy  Cervical spondylosis and moderate/severe chronic degenerative disc disease at C4-C7  · Here today for follow up after PT  · Initially noted on imaging during work-up for dysphagia/costochondritis  · Patient cancelled EGD as symptoms improved  · Continues to deny neck pain  + bilateral Xavier's (L>R) and hyper-reflexia  No numbness or weakness  Imaging reviewed as follows:  · CT soft tissue neck 5/2/2020:  No nodular enhancing lesions identified on the course of the air a digestive tract  No pathologically enlarged cervical lymph nodes by CT size criteria  Disc osteophyte complex at C5-C6 resulted in at least mild to moderate canal stenosis  Foraminal narrowing is present at this level  · XR cervical spine 4/9/2020: Moderately severe chronic disc degeneration from C4-T1  · Xray cervical spine with flexion/extension, 7/21/2020: Severe discogenic disease is seen at C4-5, C5-6 and C6-7 with endplate osteophytes and hypertrophic uncovertebral joint changes  Moderate narrowing of the bilateral C5-6 neural foramina with mild to moderate narrowing of the right C6-7 neural foramen  Plan:  · Symptoms improved with course of physical therapy  · Patient denies difficulty swallowing at this time  · Continues to have hyper-reflexia, but denies other symptoms at this time so MRI deferred as patient is severely claustrophobic and would need general anesthesia  · All questions were answered  · Patient can return on an as needed basis   She should call for another appointment if she experiences difficulty with fine motor skills (buttons, zippers, handwriting, using knife/fork), difficulty with balance/falls, or UI/BI       Diagnoses and all orders for this visit:    Tietze syndrome    Spondylosis of cervical region without myelopathy or radiculopathy    Degenerative cervical disc            CHIEF COMPLAINT    Chief Complaint   Patient presents with    Follow-up     8 week f/u after PT and Xrays       HISTORY    This is a 52yo female with a PMH significant for HPV, tobacco use, costochondritis, GERD, HLD who we last saw in May 2020  She was referred to us for difficulty swallowing, Tietze syndrome, and globus sensation  Imaging incidentally revealed cervical spinal stenosis and spondylosis  On exam she was hyperreflexic with bilateral hoffmans, but unfortunately she is severely claustrophobic and cannot tolerate MRI with GA  We referred her to PT for further treatment  She completed PT with 99% resolution of her symptoms  She remains hyperreflexic on exam, L>R, but denies difficulty with fine motor skills or coordination  She denies neck pain, BUE pain/numbness/tingling, weakness, difficulty with ambulation, UI, or BI  As she is improving she can return as needed  She should call if she begins to experience any of the above symptoms and we can readdress MRI at that time  REVIEW OF SYSTEMS    Review of Systems   Constitutional: Positive for chills  HENT: Positive for hearing loss (difficulty hearing, c/o perforated ear)  Negative for trouble swallowing  Perforated right ear   Eyes: Negative  Respiratory: Negative  Cardiovascular: Negative  Gastrointestinal: Negative  Endocrine: Positive for cold intolerance  Genitourinary: Positive for urgency  Musculoskeletal: Positive for back pain (occasional LBP) and neck stiffness (slight, left neck)  Negative for neck pain  Skin: Negative  Allergic/Immunologic: Negative  Neurological: Positive for weakness (b/l arms, unchanged) and light-headedness (with getting up too quickly, new)  Negative for numbness and headaches  Hematological: Negative  Psychiatric/Behavioral: Negative  All other systems reviewed and are negative      ROS was personally reviewed and changes made as needed     Meds/Allergies     Current Outpatient Medications   Medication Sig Dispense Refill    amLODIPine (NORVASC) 10 mg tablet Take 10 mg by mouth daily       atorvastatin (LIPITOR) 10 mg tablet Take 10 mg by mouth daily   5    celecoxib (CeleBREX) 100 mg capsule Take 1 capsule (100 mg total) by mouth 2 (two) times a day 60 capsule 3    cyclobenzaprine (FLEXERIL) 10 mg tablet Take 1 tablet (10 mg total) by mouth daily at bedtime 30 tablet 3    docusate sodium (STOOL SOFTENER LAXATIVE) 250 MG capsule Take 250 mg by mouth as needed       DULoxetine (CYMBALTA) 30 mg delayed release capsule Take 30 mg by mouth daily       famotidine (PEPCID) 40 MG tablet Take 1 tablet (40 mg total) by mouth daily at bedtime (Patient taking differently: Take 20 mg by mouth daily at bedtime ) 30 tablet 2    Multiple Vitamins-Minerals (EQ COMPLETE MULTIVITAMIN-ADULT) TABS daily       omeprazole (PriLOSEC) 20 mg delayed release capsule Take 1 capsule (20 mg total) by mouth daily 90 capsule 0     No current facility-administered medications for this visit          No Known Allergies    PAST HISTORY    Past Medical History:   Diagnosis Date    Acid reflux     Anxiety     Bacterial vaginosis     Cleft palate     Frequency of urination     HPV (human papilloma virus) anogenital infection     Hyperhidrosis     Hypertension     Perforated ear drum     Raynaud's disease     Vocal cords swelling        Past Surgical History:   Procedure Laterality Date    CLEFT PALATE REPAIR      TYMPANOPLASTY Left     TYMPANOSTOMY TUBE PLACEMENT         Social History     Tobacco Use    Smoking status: Current Every Day Smoker     Packs/day: 0 50     Types: Cigarettes    Smokeless tobacco: Never Used   Substance Use Topics    Alcohol use: Not Currently    Drug use: Never       Family History   Problem Relation Age of Onset    Lung cancer Mother     Heart disease Father     Uterine cancer Sister     No Known Problems Maternal Grandmother     No Known Problems Maternal Grandfather     No Known Problems Paternal Grandmother     No Known Problems Paternal Grandfather     No Known Problems Maternal Aunt     Cancer Paternal Aunt         unknown origin          Above history personally reviewed  EXAM    Vitals:Blood pressure 136/94, pulse (!) 122, temperature 98 9 °F (37 2 °C), temperature source Tympanic, resp  rate 16, height 5' 3" (1 6 m), weight 87 1 kg (192 lb)  ,Body mass index is 34 01 kg/m²  Physical Exam   Constitutional: She is oriented to person, place, and time  She appears well-developed and well-nourished  HENT:   Head: Normocephalic and atraumatic  Eyes: Pupils are equal, round, and reactive to light  EOM are normal    Neck: Normal range of motion  Cardiovascular: Normal rate  Pulmonary/Chest: Effort normal  No respiratory distress  Abdominal: Soft  Musculoskeletal: Normal range of motion  Neurological: She is alert and oriented to person, place, and time  She has normal strength  Gait normal    Reflex Scores:       Tricep reflexes are 3+ on the right side and 4+ on the left side  Bicep reflexes are 3+ on the right side and 4+ on the left side  Brachioradialis reflexes are 3+ on the right side and 4+ on the left side  Patellar reflexes are 2+ on the right side and 3+ on the left side  Achilles reflexes are 2+ on the right side and 2+ on the left side  Skin: Skin is warm and dry  Psychiatric: She has a normal mood and affect  Her speech is normal and behavior is normal  Judgment and thought content normal    Nursing note and vitals reviewed  Neurologic Exam     Mental Status   Oriented to person, place, and time  Follows 2 step commands  Attention: normal  Concentration: normal    Speech: speech is normal   Level of consciousness: alert  Knowledge: good  Able to repeat  Normal comprehension       Cranial Nerves   Cranial nerves II through XII intact  CN III, IV, VI   Pupils are equal, round, and reactive to light  Extraocular motions are normal      Motor Exam   Muscle bulk: normal  Overall muscle tone: normal  Right arm pronator drift: absent  Left arm pronator drift: absent    Strength   Strength 5/5 throughout  Sensory Exam   Light touch normal      Gait, Coordination, and Reflexes     Gait  Gait: normal    Tremor   Resting tremor: absent    Reflexes   Right brachioradialis: 3+  Left brachioradialis: 4+  Right biceps: 3+  Left biceps: 4+  Right triceps: 3+  Left triceps: 4+  Right patellar: 2+  Left patellar: 3+  Right achilles: 2+  Left achilles: 2+  Right : 2+  Left : 2+  Right Xavier: present  Left Xavier: present  Right ankle clonus: absent  Left ankle clonus: absent        MEDICAL DECISION MAKING    Imaging Studies:     Xr Spine Cervical Complete 6+ Vw Flex/ext/obl    Result Date: 7/25/2020  Narrative: CERVICAL SPINE INDICATION:  M50 30: Other cervical disc degeneration, unspecified cervical region  M50 30: Other cervical disc degeneration, unspecified cervical region  M47 812: Spondylosis without myelopathy or radiculopathy, cervical region  COMPARISON:  4/9/2020 VIEWS:  XR SPINE CERVICAL COMPLETE 6+ VW FLEX /EXT /OBL Images: 7 FINDINGS: No evidence of fracture  Straightening of the cervical lordosis may be related to patient positioning and/or muscle spasm  Severe discogenic disease is seen at C4-5, C5-6 and C6-7 with endplate osteophytes and hypertrophic uncovertebral joint changes  Moderate narrowing of the bilateral C5-6 neural foramina with mild to moderate narrowing of the right C6-7 neural foramen  The prevertebral soft tissues are within normal limits  The lung apices are clear  No evidence of dynamic instability seen with flexion or extension  Impression: No acute osseous abnormality  Advanced degenerative changes C4-5, C5-6 and C6-7 as above   Workstation performed: [id number]       I have personally reviewed pertinent reports     and I have personally reviewed pertinent films in PACS

## 2020-07-27 NOTE — PATIENT INSTRUCTIONS
Return as needed    Call if you begin to experience difficulty with fine motor skills, using a knife and fork, or difficulty walking

## 2020-07-27 NOTE — ASSESSMENT & PLAN NOTE
Cervical spondylosis and moderate/severe chronic degenerative disc disease at C4-C7  · Here today for follow up after PT  · Initially noted on imaging during work-up for dysphagia/costochondritis  · Patient cancelled EGD as symptoms improved  · Continues to deny neck pain  + bilateral Xavier's (L>R) and hyper-reflexia  No numbness or weakness  Imaging reviewed as follows:  · CT soft tissue neck 5/2/2020:  No nodular enhancing lesions identified on the course of the air a digestive tract  No pathologically enlarged cervical lymph nodes by CT size criteria  Disc osteophyte complex at C5-C6 resulted in at least mild to moderate canal stenosis  Foraminal narrowing is present at this level  · XR cervical spine 4/9/2020: Moderately severe chronic disc degeneration from C4-T1  · Xray cervical spine with flexion/extension, 7/21/2020: Severe discogenic disease is seen at C4-5, C5-6 and C6-7 with endplate osteophytes and hypertrophic uncovertebral joint changes  Moderate narrowing of the bilateral C5-6 neural foramina with mild to moderate narrowing of the right C6-7 neural foramen  Plan:  · Symptoms improved with course of physical therapy  · Patient denies difficulty swallowing at this time  · Continues to have hyper-reflexia, but denies other symptoms at this time so MRI deferred as patient is severely claustrophobic and would need general anesthesia  · All questions were answered  · Patient can return on an as needed basis   She should call for another appointment if she experiences difficulty with fine motor skills (buttons, zippers, handwriting, using knife/fork), difficulty with balance/falls, or UI/BI

## 2020-07-28 ENCOUNTER — APPOINTMENT (OUTPATIENT)
Dept: PHYSICAL THERAPY | Facility: CLINIC | Age: 50
End: 2020-07-28
Payer: COMMERCIAL

## 2020-07-30 ENCOUNTER — APPOINTMENT (OUTPATIENT)
Dept: PHYSICAL THERAPY | Facility: CLINIC | Age: 50
End: 2020-07-30
Payer: COMMERCIAL

## 2020-08-04 ENCOUNTER — OFFICE VISIT (OUTPATIENT)
Dept: OTOLARYNGOLOGY | Facility: CLINIC | Age: 50
End: 2020-08-04
Payer: COMMERCIAL

## 2020-08-04 VITALS
HEIGHT: 63 IN | DIASTOLIC BLOOD PRESSURE: 80 MMHG | OXYGEN SATURATION: 98 % | WEIGHT: 194 LBS | BODY MASS INDEX: 34.38 KG/M2 | HEART RATE: 101 BPM | SYSTOLIC BLOOD PRESSURE: 122 MMHG | TEMPERATURE: 97.2 F

## 2020-08-04 DIAGNOSIS — H72.91 PERFORATION OF RIGHT TYMPANIC MEMBRANE: ICD-10-CM

## 2020-08-04 DIAGNOSIS — H60.331 ACUTE SWIMMER'S EAR OF RIGHT SIDE: Primary | ICD-10-CM

## 2020-08-04 PROCEDURE — 3008F BODY MASS INDEX DOCD: CPT | Performed by: OTOLARYNGOLOGY

## 2020-08-04 PROCEDURE — 3074F SYST BP LT 130 MM HG: CPT | Performed by: OTOLARYNGOLOGY

## 2020-08-04 PROCEDURE — 3079F DIAST BP 80-89 MM HG: CPT | Performed by: OTOLARYNGOLOGY

## 2020-08-04 PROCEDURE — 99213 OFFICE O/P EST LOW 20 MIN: CPT | Performed by: OTOLARYNGOLOGY

## 2020-08-04 RX ORDER — FAMOTIDINE 20 MG/1
20 TABLET, FILM COATED ORAL DAILY
COMMUNITY
Start: 2020-06-02 | End: 2021-12-06 | Stop reason: ALTCHOICE

## 2020-08-04 RX ORDER — OFLOXACIN 3 MG/ML
SOLUTION/ DROPS OPHTHALMIC
Qty: 5 ML | Refills: 0 | Status: SHIPPED | OUTPATIENT
Start: 2020-08-04 | End: 2020-08-18

## 2020-08-04 NOTE — PROGRESS NOTES
Ayanna Tubbs is a 48 y  o female who presents for re-evaluation of neck tightness and right TM perforation  Since he was last seen she was diagnosed with Tietze syndrome which would account for her throat tightness  Since receiving this diagnosis she feels a lot of relief and feels better overall  Over the past week however she has started to develop blocked right ear and drainage  Drainage has been clear  No pain with this  She feels her hearing has significantly diminished on the right side  Past Medical History:   Diagnosis Date    Acid reflux     Anxiety     Bacterial vaginosis     Cleft palate     Frequency of urination     HPV (human papilloma virus) anogenital infection     Hyperhidrosis     Hypertension     Perforated ear drum     Raynaud's disease     Vocal cords swelling        /80 (BP Location: Right arm, Cuff Size: Large)   Pulse 101   Temp (!) 97 2 °F (36 2 °C) (Tympanic)   Ht 5' 3"   Wt 88 kg (194 lb)   SpO2 98%   BMI 34 37 kg/m²       Physical Exam   Constitutional: Oriented to person, place, and time  Well-developed and well-nourished, no apparent distress, non-toxic appearance  Cooperative, able to hear and answer questions without difficulty  Voice: Normal voice quality  Head: Normocephalic, atraumatic  No scars, masses or lesions  Face: Symmetric, no edema, no sinus tenderness  Eyes: Vision grossly intact, extra-ocular movement intact  Ears: External ear normal   Right TM perforated with purulent drainage  No post-auricular erythema or tenderness  Nose: Septum midline, nares clear  Mucosa moist, turbinates well appearing  No crusting, polyps or discharge evident  Oral cavity: Dentition intact  Mucosa moist, lips normal   Tongue mobile, floor of mouth normal   Hard palate unremarkable  No masses or lesions  Oropharynx: Uvula is midline, soft palate normal   Unremarkable oropharyngeal inlet  Tonsils unremarkable  Posterior pharyngeal wall clear   No masses or lesions  Salivary glands:  Parotid glands and submandibular glands symmetric, no enlargement or tenderness  Neck: Normal laryngeal elevation with swallow  Trachea midline  No masses or lesions  No palpable adenopathy  Thyroid: normal in size, unremarkable without tenderness or palpable nodules  Pulmonary/Chest: Normal effort and rate  No respiratory distress  Musculoskeletal: Normal range of motion  Neurological: Cranial nerves 2-12 intact  Skin: Skin is warm and dry  Psychiatric:  Anxious  A/P:  Tiny has a right otitis externa in addition to a perforation  Recommend using antibiotic drops to that ear for 1 week and follow-up in 2 weeks for re-evaluation  Once the infection is cleared we can obtain an audiogram and compared to the one that she had done in 2018

## 2020-08-04 NOTE — PROGRESS NOTES
Review of Systems   Constitutional: Negative  HENT: Positive for ear discharge, ear pain and hearing loss  Eyes: Negative  Respiratory: Negative  Cardiovascular: Negative  Gastrointestinal: Negative  Endocrine: Negative  Genitourinary: Negative  Musculoskeletal: Negative  Skin: Negative  Allergic/Immunologic: Negative  Neurological: Negative  Hematological: Negative  Psychiatric/Behavioral: Negative

## 2020-08-18 ENCOUNTER — OFFICE VISIT (OUTPATIENT)
Dept: OTOLARYNGOLOGY | Facility: CLINIC | Age: 50
End: 2020-08-18
Payer: COMMERCIAL

## 2020-08-18 ENCOUNTER — OFFICE VISIT (OUTPATIENT)
Dept: AUDIOLOGY | Facility: CLINIC | Age: 50
End: 2020-08-18
Payer: COMMERCIAL

## 2020-08-18 VITALS
DIASTOLIC BLOOD PRESSURE: 80 MMHG | HEART RATE: 98 BPM | BODY MASS INDEX: 34.55 KG/M2 | OXYGEN SATURATION: 99 % | HEIGHT: 63 IN | WEIGHT: 195 LBS | TEMPERATURE: 99 F | SYSTOLIC BLOOD PRESSURE: 118 MMHG

## 2020-08-18 DIAGNOSIS — H90.A31 MIXED CONDUCTIVE AND SENSORINEURAL HEARING LOSS OF RIGHT EAR WITH RESTRICTED HEARING OF LEFT EAR: ICD-10-CM

## 2020-08-18 DIAGNOSIS — H90.A31 MIXED CONDUCTIVE AND SENSORINEURAL HEARING LOSS OF RIGHT EAR WITH RESTRICTED HEARING OF LEFT EAR: Primary | ICD-10-CM

## 2020-08-18 DIAGNOSIS — H72.91 PERFORATION OF RIGHT TYMPANIC MEMBRANE: Primary | ICD-10-CM

## 2020-08-18 DIAGNOSIS — H90.A22 SENSORINEURAL HEARING LOSS (SNHL) OF LEFT EAR WITH RESTRICTED HEARING OF RIGHT EAR: ICD-10-CM

## 2020-08-18 DIAGNOSIS — H72.91 PERFORATION OF RIGHT TYMPANIC MEMBRANE: ICD-10-CM

## 2020-08-18 PROCEDURE — 92557 COMPREHENSIVE HEARING TEST: CPT | Performed by: AUDIOLOGIST-HEARING AID FITTER

## 2020-08-18 PROCEDURE — 3008F BODY MASS INDEX DOCD: CPT | Performed by: OTOLARYNGOLOGY

## 2020-08-18 PROCEDURE — 92567 TYMPANOMETRY: CPT | Performed by: AUDIOLOGIST-HEARING AID FITTER

## 2020-08-18 PROCEDURE — 4004F PT TOBACCO SCREEN RCVD TLK: CPT | Performed by: OTOLARYNGOLOGY

## 2020-08-18 PROCEDURE — 3079F DIAST BP 80-89 MM HG: CPT | Performed by: OTOLARYNGOLOGY

## 2020-08-18 PROCEDURE — 99214 OFFICE O/P EST MOD 30 MIN: CPT | Performed by: OTOLARYNGOLOGY

## 2020-08-18 PROCEDURE — 3074F SYST BP LT 130 MM HG: CPT | Performed by: OTOLARYNGOLOGY

## 2020-08-18 NOTE — PROGRESS NOTES
Yifan Monsivais is a 48 y  o female who presents for re-evaluation of neck tightness and right TM perforation  Since her prior visit she has been using Floxin drops to the right ear  She feels that the drainage has resolved at this point  Hearing feels that it has improved as well  Past Medical History:   Diagnosis Date    Acid reflux     Anxiety     Bacterial vaginosis     Cleft palate     Frequency of urination     HPV (human papilloma virus) anogenital infection     Hyperhidrosis     Hypertension     Perforated ear drum     Raynaud's disease     Vocal cords swelling        /80 (BP Location: Right arm, Cuff Size: Large)   Pulse 98   Temp 99 °F (37 2 °C) (Tympanic)   Ht 5' 3" (1 6 m)   Wt 88 5 kg (195 lb)   SpO2 99%   BMI 34 54 kg/m²       Physical Exam   Constitutional: Oriented to person, place, and time  Well-developed and well-nourished, no apparent distress, non-toxic appearance  Cooperative, able to hear and answer questions without difficulty  Voice: Normal voice quality  Head: Normocephalic, atraumatic  No scars, masses or lesions  Face: Symmetric, no edema, no sinus tenderness  Eyes: Vision grossly intact, extra-ocular movement intact  Ears: External ear normal   Right TM perforated  No post-auricular erythema or tenderness  Nose: Septum midline, nares clear  Mucosa moist, turbinates well appearing  No crusting, polyps or discharge evident  Oral cavity: Dentition intact  Mucosa moist, lips normal   Tongue mobile, floor of mouth normal   Hard palate unremarkable  No masses or lesions  Oropharynx: Uvula is midline, soft palate normal   Unremarkable oropharyngeal inlet  Tonsils unremarkable  Posterior pharyngeal wall clear  No masses or lesions  Salivary glands:  Parotid glands and submandibular glands symmetric, no enlargement or tenderness  Neck: Normal laryngeal elevation with swallow  Trachea midline  No masses or lesions  No palpable adenopathy    Thyroid: normal in size, unremarkable without tenderness or palpable nodules  Pulmonary/Chest: Normal effort and rate  No respiratory distress  Musculoskeletal: Normal range of motion  Neurological: Cranial nerves 2-12 intact  Skin: Skin is warm and dry  Psychiatric:  Anxious  A/P:  Audiogram obtained today which demonstrated mixed hearing loss of the right ear though largely stable when compared to her prior audiogram from 2018  She would likely benefit from having the tympanic membrane perforation closed there was still quite concerned with surgical risks, anesthesia, and likelihood of recovery  I recommend that she follow-up with Dr Hayder Tomlin for a 2nd opinion and for further surgical discussion

## 2020-08-18 NOTE — PROGRESS NOTES
ENT HEARING EVALUATION    Name:  Loree Blount  :  1970  Age:  48 y o  Date of Evaluation: 20     History: ENT Audiogram  Reason for visit: Loree Blount is being seen today at the request of Malena Eli PA-C for an evaluation of hearing as part of their follow up ENT visit  EVALUATION:    Tympanometry:   Right: Type B (large ear canal volume) - perforated eardrum    Left: Type A - normal middle ear pressure and compliance    Audiogram Results:  Pure tone testing revealed a moderately-severe rising to mild mixed hearing loss in the  right ear and a mild flat sensorineural hearing loss in the  left  ear  SRT and PTA are in agreement indicating good test reliability  Word recognition scores were excellent bilaterally             *see attached audiogram      RECOMMENDATIONS:  Consult ENT and Return to [facility]  for F/U      Valentino Bilberry, Au D   Clinical Audiologist

## 2020-08-26 DIAGNOSIS — E78.5 DYSLIPIDEMIA: ICD-10-CM

## 2020-08-26 DIAGNOSIS — K21.9 GASTROESOPHAGEAL REFLUX DISEASE, ESOPHAGITIS PRESENCE NOT SPECIFIED: ICD-10-CM

## 2020-08-26 DIAGNOSIS — M50.30 DEGENERATIVE CERVICAL DISC: Primary | ICD-10-CM

## 2020-08-26 RX ORDER — ATORVASTATIN CALCIUM 10 MG/1
10 TABLET, FILM COATED ORAL DAILY
Qty: 30 TABLET | Refills: 0 | Status: SHIPPED | OUTPATIENT
Start: 2020-08-26 | End: 2020-10-20

## 2020-08-26 RX ORDER — DULOXETIN HYDROCHLORIDE 30 MG/1
30 CAPSULE, DELAYED RELEASE ORAL DAILY
Qty: 30 CAPSULE | Refills: 0 | Status: SHIPPED | OUTPATIENT
Start: 2020-08-26 | End: 2020-09-28

## 2020-08-26 RX ORDER — OMEPRAZOLE 20 MG/1
20 CAPSULE, DELAYED RELEASE ORAL DAILY
Qty: 30 CAPSULE | Refills: 0 | Status: SHIPPED | OUTPATIENT
Start: 2020-08-26 | End: 2021-03-19

## 2020-09-04 ENCOUNTER — OFFICE VISIT (OUTPATIENT)
Dept: FAMILY MEDICINE CLINIC | Facility: CLINIC | Age: 50
End: 2020-09-04
Payer: COMMERCIAL

## 2020-09-04 VITALS
SYSTOLIC BLOOD PRESSURE: 130 MMHG | BODY MASS INDEX: 34.19 KG/M2 | DIASTOLIC BLOOD PRESSURE: 74 MMHG | RESPIRATION RATE: 20 BRPM | TEMPERATURE: 99.2 F | HEART RATE: 88 BPM | WEIGHT: 193 LBS

## 2020-09-04 DIAGNOSIS — K21.9 GASTROESOPHAGEAL REFLUX DISEASE WITHOUT ESOPHAGITIS: ICD-10-CM

## 2020-09-04 DIAGNOSIS — I10 HYPERTENSION, UNSPECIFIED TYPE: Primary | ICD-10-CM

## 2020-09-04 DIAGNOSIS — N83.299: ICD-10-CM

## 2020-09-04 DIAGNOSIS — M94.0 TIETZE SYNDROME: ICD-10-CM

## 2020-09-04 DIAGNOSIS — J38.3: ICD-10-CM

## 2020-09-04 DIAGNOSIS — M47.812 SPONDYLOSIS OF CERVICAL REGION WITHOUT MYELOPATHY OR RADICULOPATHY: ICD-10-CM

## 2020-09-04 DIAGNOSIS — H72.91 PERFORATION OF RIGHT TYMPANIC MEMBRANE: ICD-10-CM

## 2020-09-04 PROCEDURE — 99406 BEHAV CHNG SMOKING 3-10 MIN: CPT | Performed by: FAMILY MEDICINE

## 2020-09-04 PROCEDURE — 99214 OFFICE O/P EST MOD 30 MIN: CPT | Performed by: FAMILY MEDICINE

## 2020-09-04 RX ORDER — ASCORBIC ACID 500 MG
500 TABLET ORAL DAILY
COMMUNITY
End: 2020-10-09

## 2020-09-04 RX ORDER — DIPHENOXYLATE HYDROCHLORIDE AND ATROPINE SULFATE 2.5; .025 MG/1; MG/1
1 TABLET ORAL DAILY
COMMUNITY
End: 2020-10-09

## 2020-09-04 NOTE — PROGRESS NOTES
Assessment/Plan:  Hypertension controlled on current regimen    GERD without esophagitis controlled with Pepcid  Anxiety and depression controlled with Cymbalta    Hyperlipidemia on atorvastatin    Cyst of right axilla approximately 2 cm will refer to surgery  Perforation of right tympanic membrane discussed surgical options  Vocal cord ulcer improved  Tobacco use disorder discussed discussed possible pharmacological intervention to help her quit discussed the fact that her vocal cord ulcer was related to smoking  The patient is not interested in quitting at this time spent approximately 3 minutes on tobacco use disorder    Problem List Items Addressed This Visit     None           There are no diagnoses linked to this encounter  No problem-specific Assessment & Plan notes found for this encounter  PHQ-9 Depression Screening    PHQ-9:    Frequency of the following problems over the past two weeks: Body mass index is 34 19 kg/m²  BMI Counseling: Body mass index is 34 19 kg/m²  The BMI is above normal  Nutrition recommendations include reducing portion sizes, decreasing overall calorie intake, 3-5 servings of fruits/vegetables daily, reducing fast food intake, consuming healthier snacks, decreasing soda and/or juice intake, moderation in carbohydrate intake, increasing intake of lean protein, reducing intake of saturated fat and trans fat and reducing intake of cholesterol  Subjective:      Patient ID: Beck Landrum is a 48 y o  female      Patient presents for checkup on her blood pressure and also complains of a cyst in her right axilla      The following portions of the patient's history were reviewed and updated as appropriate:   She has a past medical history of Acid reflux, Anxiety, Bacterial vaginosis, Cleft palate, Frequency of urination, HPV (human papilloma virus) anogenital infection, Hyperhidrosis, Hypertension, Perforated ear drum, Raynaud's disease, and Vocal cords swelling ,  does not have any pertinent problems on file  ,   has a past surgical history that includes Cleft palate repair; Tympanostomy tube placement; and Tympanoplasty (Left)  ,  family history includes Cancer in her paternal aunt; Heart disease in her father; Lung cancer in her mother; No Known Problems in her maternal aunt, maternal grandfather, maternal grandmother, paternal grandfather, and paternal grandmother; Uterine cancer in her sister  ,   reports that she has been smoking cigarettes  She has been smoking about 0 50 packs per day  She has never used smokeless tobacco  She reports previous alcohol use  She reports that she does not use drugs  ,  has No Known Allergies     Current Outpatient Medications   Medication Sig Dispense Refill    amLODIPine (NORVASC) 10 mg tablet Take 10 mg by mouth daily       ascorbic acid (VITAMIN C) 500 mg tablet Take 500 mg by mouth daily      atorvastatin (LIPITOR) 10 mg tablet Take 1 tablet (10 mg total) by mouth daily 30 tablet 0    celecoxib (CeleBREX) 100 mg capsule Take 1 capsule (100 mg total) by mouth 2 (two) times a day 60 capsule 3    cyclobenzaprine (FLEXERIL) 10 mg tablet Take 1 tablet (10 mg total) by mouth daily at bedtime 30 tablet 3    docusate sodium (STOOL SOFTENER LAXATIVE) 250 MG capsule Take 250 mg by mouth as needed       DULoxetine (CYMBALTA) 30 mg delayed release capsule Take 1 capsule (30 mg total) by mouth daily 30 capsule 0    famotidine (PEPCID) 20 mg tablet       multivitamin (THERAGRAN) TABS Take 1 tablet by mouth daily      omeprazole (PriLOSEC) 20 mg delayed release capsule Take 1 capsule (20 mg total) by mouth daily 30 capsule 0     No current facility-administered medications for this visit  Review of Systems   Constitutional: Negative  HENT: Negative  Eyes: Negative  Respiratory: Negative  Costochondral tenderness   Cardiovascular: Negative  Gastrointestinal: Negative  Endocrine: Negative  Genitourinary: Negative  Musculoskeletal: Positive for arthralgias and myalgias  Skin: Negative  Cyst of right axilla   Allergic/Immunologic: Negative  Neurological: Negative  Hematological: Negative  Psychiatric/Behavioral: Negative  Objective:    /74 (BP Location: Right arm, Patient Position: Sitting, Cuff Size: Standard)   Pulse 88   Temp 99 2 °F (37 3 °C) (Tympanic)   Resp 20   Wt 87 5 kg (193 lb)   BMI 34 19 kg/m²   Body mass index is 34 19 kg/m²  Physical Exam  Constitutional:       Appearance: She is well-developed  HENT:      Head: Normocephalic  Ears:      Comments: Right tympanic membrane perforation  Eyes:      Pupils: Pupils are equal, round, and reactive to light  Neck:      Musculoskeletal: Normal range of motion  Cardiovascular:      Rate and Rhythm: Normal rate and regular rhythm  Heart sounds: Normal heart sounds  Pulmonary:      Effort: Pulmonary effort is normal       Breath sounds: Normal breath sounds  Abdominal:      General: Bowel sounds are normal       Palpations: Abdomen is soft  Tenderness: There is no abdominal tenderness  Musculoskeletal:      Comments: Decreased range of motion of the cervical spine   Skin:     General: Skin is warm  Comments: To cm cyst of right axilla active   Neurological:      Mental Status: She is alert and oriented to person, place, and time

## 2020-09-14 ENCOUNTER — OFFICE VISIT (OUTPATIENT)
Dept: RHEUMATOLOGY | Facility: CLINIC | Age: 50
End: 2020-09-14
Payer: COMMERCIAL

## 2020-09-14 ENCOUNTER — APPOINTMENT (OUTPATIENT)
Dept: LAB | Facility: MEDICAL CENTER | Age: 50
End: 2020-09-14
Payer: COMMERCIAL

## 2020-09-14 VITALS
BODY MASS INDEX: 34.2 KG/M2 | HEIGHT: 63 IN | SYSTOLIC BLOOD PRESSURE: 124 MMHG | DIASTOLIC BLOOD PRESSURE: 85 MMHG | WEIGHT: 193 LBS | TEMPERATURE: 98.2 F | HEART RATE: 98 BPM

## 2020-09-14 DIAGNOSIS — I73.00 RAYNAUD'S DISEASE WITHOUT GANGRENE: ICD-10-CM

## 2020-09-14 DIAGNOSIS — M50.30 DEGENERATIVE CERVICAL DISC: ICD-10-CM

## 2020-09-14 DIAGNOSIS — M62.838 MUSCLE SPASM: ICD-10-CM

## 2020-09-14 DIAGNOSIS — M94.0 TIETZE SYNDROME: Primary | ICD-10-CM

## 2020-09-14 PROCEDURE — 86235 NUCLEAR ANTIGEN ANTIBODY: CPT | Performed by: INTERNAL MEDICINE

## 2020-09-14 PROCEDURE — 36415 COLL VENOUS BLD VENIPUNCTURE: CPT | Performed by: INTERNAL MEDICINE

## 2020-09-14 PROCEDURE — 99214 OFFICE O/P EST MOD 30 MIN: CPT | Performed by: INTERNAL MEDICINE

## 2020-09-14 RX ORDER — CELECOXIB 100 MG/1
100 CAPSULE ORAL 2 TIMES DAILY
Qty: 60 CAPSULE | Refills: 6 | Status: SHIPPED | OUTPATIENT
Start: 2020-09-14 | End: 2021-03-16 | Stop reason: SDUPTHER

## 2020-09-14 RX ORDER — CYCLOBENZAPRINE HCL 10 MG
10 TABLET ORAL
Qty: 30 TABLET | Refills: 6 | Status: SHIPPED | OUTPATIENT
Start: 2020-09-14 | End: 2021-03-19 | Stop reason: SDUPTHER

## 2020-09-14 NOTE — PATIENT INSTRUCTIONS
Continue celecoxib twice a day as needed  Continue cyclobenzaprine at bedtime as needed  Do labs    Return to clinic in 6 months    Costochondritis   AMBULATORY CARE:   Costochondritis  is a condition that causes pain in the cartilage that connects your ribs to your sternum (breastbone)  Cartilage is the tough, bendable tissue that protects your bones  Common symptoms include the following:   · Sharp or dull, aching pain that may come and go or that gets worse over time    · Pain when you touch your chest    · Pain that spreads to your back, abdomen, or down your arm    · Pain that gets worse when you move, breathe deeply, or push or lift an object    · Trouble sleeping or doing your usual activities because of the pain  Seek immediate care if:   · Fever    · The painful areas of your chest look swollen and red, and feel warm to the touch    · Pain prevents you from sleeping  Contact your healthcare provider if:   · You have a fever  · The painful areas of your chest look swollen, red, and feel warm to the touch  · You cannot sleep because of the pain  · You have questions or concerns about your condition or care  Treatment for costochondritis  may not be needed  Costochondritis pain may go away without treatment, usually within a year  Treatment may include any of the following:  · Acetaminophen  decreases pain  Acetaminophen is available without a doctor's order  Ask how much to take and how often to take it  Follow directions  Acetaminophen can cause liver damage if not taken correctly  · NSAIDs , such as ibuprofen, help decrease swelling, pain, and fever  This medicine is available with or without a doctor's order  NSAIDs can cause stomach bleeding or kidney problems in certain people  If you take blood thinner medicine, always ask if NSAIDs are safe for you  Always read the medicine label and follow directions   Do not give these medicines to children under 10months of age without direction from your child's healthcare provider  Manage your symptoms:   · Rest as needed  Avoid painful movements and activities  Do not carry objects, such as a purse or backpack, if this causes pain  Avoid activities such as weightlifting until your pain decreases or goes away  Ask your healthcare provider which activities are best for you to do while you recover  · Apply heat to help decrease pain  Apply heat on the area for 20 to 30 minutes every 2 hours for as many days as directed  · Apply ice to help decrease swelling and pain  Ice may also help prevent tissue damage  Use an ice pack, or put crushed ice in a plastic bag  Cover it with a towel and place it on the painful area for 15 to 20 minutes every hour or as directed  · Do gentle stretching exercises   a doorway and put your hands on the door frame at the level of your ears or shoulders  Take 1 step forward and gently stretch your chest  Try this with your hands higher up on the doorway  Follow up with your healthcare provider as directed:  Write down your questions so you remember to ask them during your visits  © 2017 [facility] Information is for End User's use only and may not be sold, redistributed or otherwise used for commercial purposes  All illustrations and images included in CareNotes® are the copyrighted property of [employer]  or [employer]  The above information is an  only  It is not intended as medical advice for individual conditions or treatments  Talk to your doctor, nurse or pharmacist before following any medical regimen to see if it is safe and effective for you

## 2020-09-14 NOTE — PROGRESS NOTES
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), which has been controlled since initiating celecoxib  Patient should continue celecoxib 100mg po bid prn, and cyclobenzaprine 10mg po qhs for neck muscle spasms  Her Raynaud's symptoms are controlled on amlodipine, which she is already on for hypertension  Plan:  Diagnoses and all orders for this visit:    Tietze syndrome  -     celecoxib (CeleBREX) 100 mg capsule; Take 1 capsule (100 mg total) by mouth 2 (two) times a day    Degenerative cervical disc  -     cyclobenzaprine (FLEXERIL) 10 mg tablet; Take 1 tablet (10 mg total) by mouth daily at bedtime    Muscle spasm  -     cyclobenzaprine (FLEXERIL) 10 mg tablet; Take 1 tablet (10 mg total) by mouth daily at bedtime    Raynaud's disease without gangrene    Follow-up plan: Return to clinic in 6 months      Rheumatic Disease Summary  Serena Frances is a 48 y o  female who originally presented 6/5/20 as a Rheumatology consult referred by her PCP Mio Shelton DO for evaluation of possible Teitze syndrome  Patient did seem to have this condition, which is idiopathic in nature, and tends to respond to NSAIDs  It presents with bilateral sternoclavicular swelling and tenderness, heartburn symptoms, and possible radiation of swelling and pain up neck, all of which this patient has  For the time-being, stopped patient's prednisone and started celecoxib 100 mg p o  B i d  She also had some muscle spasm secondary to cervical degenerative disc disease, for which I prescribed cyclobenzaprine 10 mg p o  Q h s  Ordered anti Scl 70 and anticentromere antibody for patient to do when she gets a chance to rule-out diffuse/limited scleroderma as cause of patient's GERD and Raynaud's symptoms; ultimately returned negative         Chief Complaint   Raynaud's disease without gangrene   Tietze syndrome     HPI  Brayan Rush is a 48 y o   female who presents for follow-up of Teitze syndrome  Last clinic visit was 6/5/20, which was her initial visit  Pt states she feels better since last visit  States she went to PT and continued prescribed medications (celecoxib and cyclobenzaprine), which helped significantly  Pt states her Raynaud's symptoms are better than before but are still there sometimes; she is already on amlodipine for her blood pressure, it has helped her Raynaud's symptoms as well  The following portions of the patient's history were reviewed and updated as appropriate: allergies, current medications, past family history, past medical history, past social history, past surgical history and problem list     Review of Systems:   Review of Systems   Constitutional: Negative for chills, fatigue, fever and unexpected weight change  HENT: Negative for mouth sores and trouble swallowing  Eyes: Negative for pain and visual disturbance  Respiratory: Negative for cough and shortness of breath  Cardiovascular: Negative for chest pain and leg swelling  High blood pressure   Gastrointestinal: Negative for constipation and diarrhea  Indigestion/Heartburn   Endocrine: Positive for cold intolerance and polydipsia  Genitourinary: Positive for frequency  Musculoskeletal: Positive for back pain, myalgias and neck pain  Negative for arthralgias and joint swelling  Skin: Positive for color change  Negative for rash  Neurological: Negative for weakness  Hematological: Negative for adenopathy  Psychiatric/Behavioral: Negative for sleep disturbance         Home Medications:    Current Outpatient Medications:     celecoxib (CeleBREX) 100 mg capsule, Take 1 capsule (100 mg total) by mouth 2 (two) times a day, Disp: 60 capsule, Rfl: 6    cyclobenzaprine (FLEXERIL) 10 mg tablet, Take 1 tablet (10 mg total) by mouth daily at bedtime, Disp: 30 tablet, Rfl: 6    famotidine (PEPCID) 20 mg tablet, , Disp: , Rfl:     omeprazole (PriLOSEC) 20 mg delayed release capsule, Take 1 capsule (20 mg total) by mouth daily, Disp: 30 capsule, Rfl: 0    amLODIPine (NORVASC) 10 mg tablet, Take 1 tablet by mouth once daily, Disp: 30 tablet, Rfl: 0    atorvastatin (LIPITOR) 10 mg tablet, Take 1 tablet by mouth once daily, Disp: 30 tablet, Rfl: 0    Objective:    Vitals:    09/14/20 1059   BP: 124/85   BP Location: Right arm   Patient Position: Sitting   Cuff Size: Standard   Pulse: 98   Temp: 98 2 °F (36 8 °C)   TempSrc: Temporal   Weight: 87 5 kg (193 lb)   Height: 5' 3" (1 6 m)       Physical Exam  Constitutional:       General: She is not in acute distress  Appearance: She is well-developed  HENT:      Head: Normocephalic and atraumatic  Eyes:      General: Lids are normal  No scleral icterus  Conjunctiva/sclera: Conjunctivae normal    Neck:      Musculoskeletal: Neck supple  No muscular tenderness  Cardiovascular:      Rate and Rhythm: Normal rate and regular rhythm  Heart sounds: S1 normal and S2 normal  No murmur  No friction rub  Pulmonary:      Effort: Pulmonary effort is normal  No tachypnea or respiratory distress  Breath sounds: Normal breath sounds  No wheezing, rhonchi or rales  Musculoskeletal:         General: No swelling or tenderness  Skin:     General: Skin is warm and dry  Findings: No rash  Nails: There is no clubbing  Neurological:      Mental Status: She is alert  Sensory: No sensory deficit  Psychiatric:         Behavior: Behavior normal  Behavior is cooperative  Reviewed labs and imaging      Imaging:   CXR 4/9/20: unremarkable     Cervical Spine x-rays 4/9/20  IMPRESSION:  Moderately severe chronic disc degeneration from C4 to T1     Head/Neck Soft Tissue Ultrasound 4/9/20  FINDINGS/IMPRESSION:  1   Normal exam   2   Scattered, morphologically-normal anterior cervical chain lymph nodes are demonstrated   These measure up to 5 mm in the short axis   Upper limit of normal in this area is 15 mm   No pathologic lymphadenopathy demonstrated in the anterior or   posterior cervical chains  3   Left thyroid lobe was also imaged and is normal in size and appearance      Labs:   Office Visit on 06/05/2020   Component Date Value Ref Range Status    Anti-Centromere B Antibodies 09/14/2020 <0 2  0 0 - 0 9 AI Final    Scleroderma SCL-70 09/14/2020 <0 2  0 0 - 0 9 AI Final   Appointment on 06/02/2020   Component Date Value Ref Range Status    WBC 06/02/2020 6 72  4 31 - 10 16 Thousand/uL Final    RBC 06/02/2020 4 56  3 81 - 5 12 Million/uL Final    Hemoglobin 06/02/2020 14 3  11 5 - 15 4 g/dL Final    Hematocrit 06/02/2020 42 2  34 8 - 46 1 % Final    MCV 06/02/2020 93  82 - 98 fL Final    MCH 06/02/2020 31 4  26 8 - 34 3 pg Final    MCHC 06/02/2020 33 9  31 4 - 37 4 g/dL Final    RDW 06/02/2020 13 2  11 6 - 15 1 % Final    MPV 06/02/2020 10 7  8 9 - 12 7 fL Final    Platelets 14/02/5622 226  149 - 390 Thousands/uL Final    nRBC 06/02/2020 0  /100 WBCs Final    Neutrophils Relative 06/02/2020 62  43 - 75 % Final    Immat GRANS % 06/02/2020 0  0 - 2 % Final    Lymphocytes Relative 06/02/2020 29  14 - 44 % Final    Monocytes Relative 06/02/2020 7  4 - 12 % Final    Eosinophils Relative 06/02/2020 1  0 - 6 % Final    Basophils Relative 06/02/2020 1  0 - 1 % Final    Neutrophils Absolute 06/02/2020 4 15  1 85 - 7 62 Thousands/µL Final    Immature Grans Absolute 06/02/2020 0 01  0 00 - 0 20 Thousand/uL Final    Lymphocytes Absolute 06/02/2020 1 92  0 60 - 4 47 Thousands/µL Final    Monocytes Absolute 06/02/2020 0 48  0 17 - 1 22 Thousand/µL Final    Eosinophils Absolute 06/02/2020 0 09  0 00 - 0 61 Thousand/µL Final    Basophils Absolute 06/02/2020 0 07  0 00 - 0 10 Thousands/µL Final    Sed Rate 06/02/2020 20  0 - 20 mm/hour Final    Lyme IgG/IgM Ab 06/02/2020 <0 91  0 00 - 0 90 ISR Final Negative         <0 91                                  Equivocal  0 91 - 1 09                                  Positive         >1 09    Rheumatoid Factor 06/02/2020 Negative  Negative Final   Admission on 05/02/2020, Discharged on 05/02/2020   Component Date Value Ref Range Status    WBC 05/02/2020 7 92  4 31 - 10 16 Thousand/uL Final    RBC 05/02/2020 4 83  3 81 - 5 12 Million/uL Final    Hemoglobin 05/02/2020 15 2  11 5 - 15 4 g/dL Final    Hematocrit 05/02/2020 43 4  34 8 - 46 1 % Final    MCV 05/02/2020 90  82 - 98 fL Final    MCH 05/02/2020 31 5  26 8 - 34 3 pg Final    MCHC 05/02/2020 35 0  31 4 - 37 4 g/dL Final    RDW 05/02/2020 12 5  11 6 - 15 1 % Final    MPV 05/02/2020 9 4  8 9 - 12 7 fL Final    Platelets 68/39/6089 271  149 - 390 Thousands/uL Final    nRBC 05/02/2020 0  /100 WBCs Final    Neutrophils Relative 05/02/2020 72  43 - 75 % Final    Immat GRANS % 05/02/2020 0  0 - 2 % Final    Lymphocytes Relative 05/02/2020 19  14 - 44 % Final    Monocytes Relative 05/02/2020 8  4 - 12 % Final    Eosinophils Relative 05/02/2020 0  0 - 6 % Final    Basophils Relative 05/02/2020 1  0 - 1 % Final    Neutrophils Absolute 05/02/2020 5 72  1 85 - 7 62 Thousands/µL Final    Immature Grans Absolute 05/02/2020 0 03  0 00 - 0 20 Thousand/uL Final    Lymphocytes Absolute 05/02/2020 1 47  0 60 - 4 47 Thousands/µL Final    Monocytes Absolute 05/02/2020 0 62  0 17 - 1 22 Thousand/µL Final    Eosinophils Absolute 05/02/2020 0 01  0 00 - 0 61 Thousand/µL Final    Basophils Absolute 05/02/2020 0 07  0 00 - 0 10 Thousands/µL Final    Sodium 05/02/2020 137  136 - 145 mmol/L Final    Potassium 05/02/2020 3 3* 3 5 - 5 3 mmol/L Final    Chloride 05/02/2020 100  100 - 108 mmol/L Final    CO2 05/02/2020 25  21 - 32 mmol/L Final    ANION GAP 05/02/2020 12  4 - 13 mmol/L Final    BUN 05/02/2020 7  5 - 25 mg/dL Final    Creatinine 05/02/2020 0 88  0 60 - 1 30 mg/dL Final    Standardized to IDMS reference method    Glucose 05/02/2020 101  65 - 140 mg/dL Final      If the patient is fasting, the ADA then defines impaired fasting glucose as > 100 mg/dL and diabetes as > or equal to 123 mg/dL  Specimen collection should occur prior to Sulfasalazine administration due to the potential for falsely depressed results  Specimen collection should occur prior to Sulfapyridine administration due to the potential for falsely elevated results   Calcium 05/02/2020 9 3  8 3 - 10 1 mg/dL Final    AST 05/02/2020 20  5 - 45 U/L Final      Specimen collection should occur prior to Sulfasalazine administration due to the potential for falsely depressed results   ALT 05/02/2020 21  12 - 78 U/L Final      Specimen collection should occur prior to Sulfasalazine administration due to the potential for falsely depressed results   Alkaline Phosphatase 05/02/2020 92  46 - 116 U/L Final    Total Protein 05/02/2020 7 9  6 4 - 8 2 g/dL Final    Albumin 05/02/2020 4 2  3 5 - 5 0 g/dL Final    Total Bilirubin 05/02/2020 0 40  0 20 - 1 00 mg/dL Final    eGFR 05/02/2020 77  ml/min/1 73sq m Final    Troponin I 05/02/2020 <0 02  <=0 04 ng/mL Final    3Autovalidation override  [employer] Chemistry analyzer 99% cutoff is > 0 04 ng/mL in network labs     o cTnI 99% cutoff is useful only when applied to patients in the clinical setting of myocardial ischemia   o cTnI 99% cutoff should be interpreted in the context of clinical history, ECG findings and possibly cardiac imaging to establish correct diagnosis  o cTnI 99% cutoff may be suggestive but clearly not indicative of a coronary event without the clinical setting of myocardial ischemia        Magnesium 05/02/2020 2 0  1 6 - 2 6 mg/dL Final    PTT 05/02/2020 28  23 - 37 seconds Final    Therapeutic Heparin Range =  60-90 seconds    Protime 05/02/2020 14 0  11 6 - 14 5 seconds Final    INR 05/02/2020 1 08  0 84 - 1 19 Final    SARS-CoV-2 05/02/2020 Negative  Negative Final  Color, UA 05/02/2020 Yellow   Final    Clarity, UA 05/02/2020 Clear   Final    Specific Gravity, UA 05/02/2020 <=1 005  1 003 - 1 030 Final    pH, UA 05/02/2020 6 0  4 5, 5 0, 5 5, 6 0, 6 5, 7 0, 7 5, 8 0 Final    Leukocytes, UA 05/02/2020 Trace* Negative Final    Nitrite, UA 05/02/2020 Negative  Negative Final    Protein, UA 05/02/2020 Negative  Negative mg/dl Final    Glucose, UA 05/02/2020 Negative  Negative mg/dl Final    Ketones, UA 05/02/2020 15 (1+)* Negative mg/dl Final    Urobilinogen, UA 05/02/2020 0 2  0 2, 1 0 E U /dl E U /dl Final    Bilirubin, UA 05/02/2020 Negative  Negative Final    Blood, UA 05/02/2020 Trace-Intact* Negative Final    RBC, UA 05/02/2020 0-1* None Seen, 0-5 /hpf Final    WBC, UA 05/02/2020 0-1* None Seen, 0-5, 5-55, 5-65 /hpf Final    Epithelial Cells 05/02/2020 Occasional  None Seen, Occasional /hpf Final    Bacteria, UA 05/02/2020 None Seen  None Seen, Occasional /hpf Final    Ventricular Rate 05/02/2020 125  BPM Final    Atrial Rate 05/02/2020 125  BPM Final    [place] Interval 05/02/2020 140  ms Final    QRSD Interval 05/02/2020 70  ms Final    QT Interval 05/02/2020 302  ms Final    QTC Interval 05/02/2020 435  ms Final    P Axis 05/02/2020 54  degrees Final    QRS Axis 05/02/2020 8  degrees Final    T Wave [place] 05/02/2020 11  degrees Final   Office Visit on 05/02/2020   Component Date Value Ref Range Status    Ventricular Rate 05/02/2020 131  BPM Final    Atrial Rate 05/02/2020 131  BPM Final    NJ Interval 05/02/2020 144  ms Final    QRSD Interval 05/02/2020 74  ms Final    QT Interval 05/02/2020 282  ms Final    QTC Interval 05/02/2020 416  ms Final    P Axis 05/02/2020 49  degrees Final    QRS Axis 05/02/2020 11  degrees Final    T Wave [place] 05/02/2020 11  degrees Final   Appointment on 04/09/2020   Component Date Value Ref Range Status    WBC 04/09/2020 13 81* 4 31 - 10 16 Thousand/uL Final    RBC 04/09/2020 4 90  3 81 - 5 12 Million/uL Final    Hemoglobin 04/09/2020 15 4  11 5 - 15 4 g/dL Final    Hematocrit 04/09/2020 45 0  34 8 - 46 1 % Final    MCV 04/09/2020 92  82 - 98 fL Final    MCH 04/09/2020 31 4  26 8 - 34 3 pg Final    MCHC 04/09/2020 34 2  31 4 - 37 4 g/dL Final    RDW 04/09/2020 13 3  11 6 - 15 1 % Final    MPV 04/09/2020 9 7  8 9 - 12 7 fL Final    Platelets 84/95/7976 286  149 - 390 Thousands/uL Final    nRBC 04/09/2020 0  /100 WBCs Final    Neutrophils Relative 04/09/2020 79* 43 - 75 % Final    Immat GRANS % 04/09/2020 1  0 - 2 % Final    Lymphocytes Relative 04/09/2020 13* 14 - 44 % Final    Monocytes Relative 04/09/2020 6  4 - 12 % Final    Eosinophils Relative 04/09/2020 0  0 - 6 % Final    Basophils Relative 04/09/2020 1  0 - 1 % Final    Neutrophils Absolute 04/09/2020 11 00* 1 85 - 7 62 Thousands/µL Final    Immature Grans Absolute 04/09/2020 0 15  0 00 - 0 20 Thousand/uL Final    Lymphocytes Absolute 04/09/2020 1 78  0 60 - 4 47 Thousands/µL Final    Monocytes Absolute 04/09/2020 0 81  0 17 - 1 22 Thousand/µL Final    Eosinophils Absolute 04/09/2020 0 00  0 00 - 0 61 Thousand/µL Final    Basophils Absolute 04/09/2020 0 07  0 00 - 0 10 Thousands/µL Final    Sed Rate 04/09/2020 2  0 - 20 mm/hour Final    ANNAMARIE 04/09/2020 Negative  Negative Final    CRP 04/09/2020 <0 5  <3 0 mg/L Final    3    TSH 3RD GENERATON 04/09/2020 1 588  0 358 - 3 740 uIU/mL Final      The recommended reference ranges for TSH during pregnancy are as follows:   First trimester 0 1 to 2 5 uIU/mL   Second trimester  0 2 to 3 0 uIU/mL   Third trimester 0 3 to 3 0 uIU/m    Note: Normal ranges may not apply to patients who are transgender, non-binary, or whose legal sex, sex at birth, and gender identity differ  Using supplements with high doses of biotin 20 to more than 300 times greater than the adequate daily intake for adults of 30 mcg/day as established by the [place] [employer], can cause falsely depress results     Appointment on 10/08/2019   Component Date Value Ref Range Status    Sodium 10/08/2019 141  136 - 145 mmol/L Final    Potassium 10/08/2019 3 7  3 5 - 5 3 mmol/L Final    Chloride 10/08/2019 109* 100 - 108 mmol/L Final    CO2 10/08/2019 25  21 - 32 mmol/L Final    ANION GAP 10/08/2019 7  4 - 13 mmol/L Final    BUN 10/08/2019 8  5 - 25 mg/dL Final    Creatinine 10/08/2019 0 82  0 60 - 1 30 mg/dL Final    Standardized to IDMS reference method    Glucose, Fasting 10/08/2019 84  65 - 99 mg/dL Final      Specimen collection should occur prior to Sulfasalazine administration due to the potential for falsely depressed results  Specimen collection should occur prior to Sulfapyridine administration due to the potential for falsely elevated results   Calcium 10/08/2019 8 9  8 3 - 10 1 mg/dL Final    AST 10/08/2019 16  5 - 45 U/L Final      Specimen collection should occur prior to Sulfasalazine administration due to the potential for falsely depressed results   ALT 10/08/2019 17  12 - 78 U/L Final      Specimen collection should occur prior to Sulfasalazine and/or Sulfapyridine administration due to the potential for falsely depressed results   Alkaline Phosphatase 10/08/2019 82  46 - 116 U/L Final    Total Protein 10/08/2019 6 9  6 4 - 8 2 g/dL Final    Albumin 10/08/2019 3 5  3 5 - 5 0 g/dL Final    Total Bilirubin 10/08/2019 0 37  0 20 - 1 00 mg/dL Final    eGFR 10/08/2019 84  ml/min/1 73sq m Final    TSH 3RD GENERATON 10/08/2019 1 910  0 358 - 3 740 uIU/mL Final      The recommended reference ranges for TSH during pregnancy are as follows:   First trimester 0 1 to 2 5 uIU/mL   Second trimester  0 2 to 3 0 uIU/mL   Third trimester 0 3 to 3 0 uIU/m    Note: Normal ranges may not apply to patients who are transgender, non-binary, or whose legal sex, sex at birth, and gender identity differ    Using supplements with high doses of biotin 20 to more than 300 times greater than the adequate daily intake for adults of 30 mcg/day as established by the [place] [employer], can cause falsely depress results      Vit D, 25-Hydroxy 10/08/2019 31 1  30 0 - 100 0 ng/mL Final    LDL Direct 10/08/2019 69  0 - 100 mg/dl Final    Hemoglobin A1C 10/08/2019 5 3  4 2 - 6 3 % Final    EAG 10/08/2019 105  mg/dl Final    WBC 10/08/2019 6 87  4 31 - 10 16 Thousand/uL Final    RBC 10/08/2019 4 50  3 81 - 5 12 Million/uL Final    Hemoglobin 10/08/2019 13 8  11 5 - 15 4 g/dL Final    Hematocrit 10/08/2019 42 0  34 8 - 46 1 % Final    MCV 10/08/2019 93  82 - 98 fL Final    MCH 10/08/2019 30 7  26 8 - 34 3 pg Final    MCHC 10/08/2019 32 9  31 4 - 37 4 g/dL Final    RDW 10/08/2019 13 5  11 6 - 15 1 % Final    MPV 10/08/2019 11 1  8 9 - 12 7 fL Final    Platelets 00/30/7888 243  149 - 390 Thousands/uL Final    nRBC 10/08/2019 0  /100 WBCs Final    Neutrophils Relative 10/08/2019 58  43 - 75 % Final    Immat GRANS % 10/08/2019 0  0 - 2 % Final    Lymphocytes Relative 10/08/2019 29  14 - 44 % Final    Monocytes Relative 10/08/2019 10  4 - 12 % Final    Eosinophils Relative 10/08/2019 2  0 - 6 % Final    Basophils Relative 10/08/2019 1  0 - 1 % Final    Neutrophils Absolute 10/08/2019 4 02  1 85 - 7 62 Thousands/µL Final    Immature Grans Absolute 10/08/2019 0 02  0 00 - 0 20 Thousand/uL Final    Lymphocytes Absolute 10/08/2019 1 99  0 60 - 4 47 Thousands/µL Final    Monocytes Absolute 10/08/2019 0 65  0 17 - 1 22 Thousand/µL Final    Eosinophils Absolute 10/08/2019 0 11  0 00 - 0 61 Thousand/µL Final    Basophils Absolute 10/08/2019 0 08  0 00 - 0 10 Thousands/µL Final

## 2020-09-15 LAB
CENTROMERE B AB SER-ACNC: <0.2 AI (ref 0–0.9)
ENA SCL70 AB SER-ACNC: <0.2 AI (ref 0–0.9)

## 2020-09-17 ENCOUNTER — OFFICE VISIT (OUTPATIENT)
Dept: SURGERY | Facility: CLINIC | Age: 50
End: 2020-09-17
Payer: COMMERCIAL

## 2020-09-17 VITALS
SYSTOLIC BLOOD PRESSURE: 126 MMHG | BODY MASS INDEX: 34.91 KG/M2 | WEIGHT: 197 LBS | DIASTOLIC BLOOD PRESSURE: 86 MMHG | TEMPERATURE: 99.4 F | HEIGHT: 63 IN | HEART RATE: 116 BPM

## 2020-09-17 DIAGNOSIS — L72.3 SEBACEOUS CYST OF RIGHT AXILLA: Primary | ICD-10-CM

## 2020-09-17 DIAGNOSIS — N83.299: ICD-10-CM

## 2020-09-17 PROCEDURE — 99212 OFFICE O/P EST SF 10 MIN: CPT | Performed by: SURGERY

## 2020-09-17 PROCEDURE — 4004F PT TOBACCO SCREEN RCVD TLK: CPT | Performed by: SURGERY

## 2020-09-20 PROBLEM — L72.3 SEBACEOUS CYST OF RIGHT AXILLA: Status: ACTIVE | Noted: 2020-09-20

## 2020-09-20 PROCEDURE — 10060 I&D ABSCESS SIMPLE/SINGLE: CPT | Performed by: SURGERY

## 2020-09-20 NOTE — ASSESSMENT & PLAN NOTE
Draining infected cystic lesion of right axilla  Patient underwent incision drainage in the office with evacuation of some purulent material addition to a large amount of cheeselike material   Incision packed  Packing removed in 24 hours  Follow-up 1 week

## 2020-09-20 NOTE — PROGRESS NOTES
Assessment/Plan:    Sebaceous cyst of right axilla  Draining infected cystic lesion of right axilla  Patient underwent incision drainage in the office with evacuation of some purulent material addition to a large amount of cheeselike material   Incision packed  Packing removed in 24 hours  Follow-up 1 week  Diagnoses and all orders for this visit:    Sebaceous cyst of right axilla    Germinal epithelial inclusion cyst  -     Ambulatory referral to General Surgery          Subjective:      Patient ID: Juan A Perez is a 48 y o  female  49-year-old female with known hypertension, hyper addresses, GERD, presents to the office for evaluation of a draining cystic lesion of the right axilla  Patient states has been draining for few days are almost better part of a week  It is tender to palpation  She admits to some purulent drainage  No fevers or chills  No significant redness to the area  No similar lesions in the axilla prior  Patient states she does feel this hard lump there  She would like to know if this needs to be further drained or if she needs antibiotics at this point  The following portions of the patient's history were reviewed and updated as appropriate:   She  has a past medical history of Acid reflux, Anxiety, Bacterial vaginosis, Cleft palate, Frequency of urination, HPV (human papilloma virus) anogenital infection, Hyperhidrosis, Hypertension, Perforated ear drum, Raynaud's disease, and Vocal cords swelling    She   Patient Active Problem List    Diagnosis Date Noted    Sebaceous cyst of right axilla 09/20/2020    Cervical stenosis of spine 05/27/2020    Spondylosis of cervical region without myelopathy or radiculopathy 05/27/2020    Degenerative cervical disc 05/15/2020    Tobacco use 05/15/2020    Screening for colon cancer 05/15/2020    Fibroids 05/08/2020    Ovarian cyst 47/25/7754    Umbilical hernia 94/48/0183    Dysphagia 05/02/2020    Tachycardia 05/02/2020    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    Degeneration of intervertebral disc of cervical region 04/13/2020    Tietze syndrome 03/30/2020    Age-related nuclear cataract, bilateral 02/07/2020    Presbyopia 02/07/2020    Abnormality of right breast on screening mammogram 01/27/2020    High blood pressure 11/13/2019    Microhematuria 04/11/2019    Blood in urine 03/27/2019    H/O colposcopy with cervical biopsy 03/27/2019    Vocal cord contact ulcer 03/12/2019    HPV (human papilloma virus) infection 02/26/2019    Vocal cords swelling 01/13/2015    Acid reflux 07/01/2003    Claustrophobia 01/01/1997    Raynaud's disease 10/01/1984    Perforation of tympanic membrane 04/01/1981    Cleft palate 1970    Deviated septum 1970     She  has a past surgical history that includes Cleft palate repair; Tympanostomy tube placement; and Tympanoplasty (Left)  Her family history includes Cancer in her paternal aunt; Heart disease in her father; Lung cancer in her mother; No Known Problems in her maternal aunt, maternal grandfather, maternal grandmother, paternal grandfather, and paternal grandmother; Uterine cancer in her sister  She  reports that she has been smoking cigarettes  She has been smoking about 0 50 packs per day  She has quit using smokeless tobacco  She reports previous alcohol use  She reports that she does not use drugs    Current Outpatient Medications   Medication Sig Dispense Refill    amLODIPine (NORVASC) 10 mg tablet Take 10 mg by mouth daily       ascorbic acid (VITAMIN C) 500 mg tablet Take 500 mg by mouth daily      atorvastatin (LIPITOR) 10 mg tablet Take 1 tablet (10 mg total) by mouth daily 30 tablet 0    celecoxib (CeleBREX) 100 mg capsule Take 1 capsule (100 mg total) by mouth 2 (two) times a day 60 capsule 6    cyclobenzaprine (FLEXERIL) 10 mg tablet Take 1 tablet (10 mg total) by mouth daily at bedtime 30 tablet 6    docusate sodium (STOOL SOFTENER LAXATIVE) 250 MG capsule Take 250 mg by mouth as needed       DULoxetine (CYMBALTA) 30 mg delayed release capsule Take 1 capsule (30 mg total) by mouth daily 30 capsule 0    famotidine (PEPCID) 20 mg tablet       multivitamin (THERAGRAN) TABS Take 1 tablet by mouth daily      omeprazole (PriLOSEC) 20 mg delayed release capsule Take 1 capsule (20 mg total) by mouth daily 30 capsule 0     No current facility-administered medications for this visit  She has No Known Allergies       Review of Systems   Constitutional: Negative for activity change, appetite change, chills, diaphoresis, fatigue, fever and unexpected weight change  Musculoskeletal: Negative for gait problem  Skin: Positive for wound (Draining cystic lesion of right axilla)  Negative for color change, pallor and rash  Objective:      /86   Pulse (!) 116   Temp 99 4 °F (37 4 °C)   Ht 5' 3" (1 6 m)   Wt 89 4 kg (197 lb)   BMI 34 90 kg/m²            Physical Exam  Vitals signs reviewed  Constitutional:       Appearance: Normal appearance  She is not toxic-appearing or diaphoretic  HENT:      Head: Normocephalic and atraumatic  Eyes:      General: No scleral icterus  Pulmonary:      Effort: Pulmonary effort is normal  No respiratory distress  Skin:     General: Skin is warm  Coloration: Skin is not jaundiced or pale  Findings: Lesion (2 cm cystic lesion of the right axilla  Draining with noted purulent material   No significant cellulitis noted ) present  No bruising or erythema  Neurological:      General: No focal deficit present  Mental Status: She is alert and oriented to person, place, and time  Cranial Nerves: No cranial nerve deficit  Psychiatric:         Mood and Affect: Mood normal          Behavior: Behavior normal          Thought Content:  Thought content normal          Judgment: Judgment normal            Incision and Drainage    Date/Time: 9/20/2020 6:55 PM  Performed by: Michael Mendoza DO  Authorized by: Rainer Tristan Bert Opitz, DO     Other Assisting Provider: No    Consent:     Consent obtained:  Verbal    Consent given by:  Patient    Risks discussed:  Bleeding, incomplete drainage, pain and infection    Alternatives discussed:  No treatment and delayed treatment  Universal protocol:     Procedure explained and questions answered to patient or proxy's satisfaction: yes      Patient identity confirmed:  Verbally with patient  Location:     Type:  Abscess and cyst    Location:  Upper extremity (Right axilla)  Pre-procedure details:     Skin preparation:  Chloraprep  Anesthesia (see MAR for exact dosages): Anesthesia method:  Local infiltration    Local anesthetic:  Lidocaine 1% WITH epi  Procedure details:     Complexity:  Simple    Needle aspiration: no      Incision types:  Cruciate and stab incision    Scalpel blade:  11    Approach:  Open    Incision depth:  Subcutaneous    Wound management:  Probed and deloculated and irrigated with saline    Drainage:  Purulent and serosanguinous    Drainage amount: Moderate    Wound treatment:  Wound left open    Packing materials:  1/4 in gauze  Post-procedure details:     Patient tolerance of procedure:   Tolerated well, no immediate complications

## 2020-09-24 ENCOUNTER — OFFICE VISIT (OUTPATIENT)
Dept: SURGERY | Facility: CLINIC | Age: 50
End: 2020-09-24

## 2020-09-24 VITALS
BODY MASS INDEX: 34.73 KG/M2 | RESPIRATION RATE: 18 BRPM | SYSTOLIC BLOOD PRESSURE: 117 MMHG | TEMPERATURE: 98.6 F | DIASTOLIC BLOOD PRESSURE: 79 MMHG | HEIGHT: 63 IN | WEIGHT: 196 LBS

## 2020-09-24 DIAGNOSIS — L72.3 SEBACEOUS CYST OF RIGHT AXILLA: Primary | ICD-10-CM

## 2020-09-24 PROCEDURE — 99024 POSTOP FOLLOW-UP VISIT: CPT | Performed by: SURGERY

## 2020-09-24 NOTE — PROGRESS NOTES
Assessment/Plan:    Sebaceous cyst of right axilla  Status post incision drainage of infected sebaceous cyst of the right axilla  On exam all healed up  No active drainage  No erythema  Nontender  Patient instructed to follow up if this cyst feels back up thus we can then completely excise it  Otherwise she can follow up as needed  Diagnoses and all orders for this visit:    Sebaceous cyst of right axilla          Subjective:      Patient ID: Barbie Cochran is a 48 y o  female  80-year-old female status post incision drainage a new rather large right axillary sebaceous cyst presents today for follow-up  Overall doing well  No nausea vomiting no fevers or chills  Denies any active drainage from the incision drainage site  Nontender  Says that the areas remained soft and flat  The following portions of the patient's history were reviewed and updated as appropriate:   She  has a past medical history of Acid reflux, Anxiety, Bacterial vaginosis, Cleft palate, Frequency of urination, HPV (human papilloma virus) anogenital infection, Hyperhidrosis, Hypertension, Perforated ear drum, Raynaud's disease, and Vocal cords swelling    She   Patient Active Problem List    Diagnosis Date Noted    Sebaceous cyst of right axilla 09/20/2020    Cervical stenosis of spine 05/27/2020    Spondylosis of cervical region without myelopathy or radiculopathy 05/27/2020    Degenerative cervical disc 05/15/2020    Tobacco use 05/15/2020    Screening for colon cancer 05/15/2020    Fibroids 05/08/2020    Ovarian cyst 30/97/4975    Umbilical hernia 58/52/2477    Dysphagia 05/02/2020    Tachycardia 05/02/2020    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    Degeneration of intervertebral disc of cervical region 04/13/2020    Tietze syndrome 03/30/2020    Age-related nuclear cataract, bilateral 02/07/2020    Presbyopia 02/07/2020    Abnormality of right breast on screening mammogram 01/27/2020    High blood pressure 11/13/2019  Microhematuria 04/11/2019    Blood in urine 03/27/2019    H/O colposcopy with cervical biopsy 03/27/2019    Vocal cord contact ulcer 03/12/2019    HPV (human papilloma virus) infection 02/26/2019    Vocal cords swelling 01/13/2015    Acid reflux 07/01/2003    Claustrophobia 01/01/1997    Raynaud's disease 10/01/1984    Perforation of tympanic membrane 04/01/1981    Cleft palate 1970    Deviated septum 1970     She  has a past surgical history that includes Cleft palate repair; Tympanostomy tube placement; and Tympanoplasty (Left)  Her family history includes Cancer in her paternal aunt; Heart disease in her father; Lung cancer in her mother; No Known Problems in her maternal aunt, maternal grandfather, maternal grandmother, paternal grandfather, and paternal grandmother; Uterine cancer in her sister  She  reports that she has been smoking cigarettes  She has been smoking about 0 50 packs per day  She has quit using smokeless tobacco  She reports previous alcohol use  She reports that she does not use drugs    Current Outpatient Medications   Medication Sig Dispense Refill    amLODIPine (NORVASC) 10 mg tablet Take 10 mg by mouth daily       ascorbic acid (VITAMIN C) 500 mg tablet Take 500 mg by mouth daily      atorvastatin (LIPITOR) 10 mg tablet Take 1 tablet (10 mg total) by mouth daily (Patient not taking: Reported on 9/24/2020) 30 tablet 0    celecoxib (CeleBREX) 100 mg capsule Take 1 capsule (100 mg total) by mouth 2 (two) times a day (Patient not taking: Reported on 9/24/2020) 60 capsule 6    cyclobenzaprine (FLEXERIL) 10 mg tablet Take 1 tablet (10 mg total) by mouth daily at bedtime (Patient not taking: Reported on 9/24/2020) 30 tablet 6    docusate sodium (STOOL SOFTENER LAXATIVE) 250 MG capsule Take 250 mg by mouth as needed       DULoxetine (CYMBALTA) 30 mg delayed release capsule Take 1 capsule (30 mg total) by mouth daily (Patient not taking: Reported on 9/24/2020) 30 capsule 0    famotidine (PEPCID) 20 mg tablet       multivitamin (THERAGRAN) TABS Take 1 tablet by mouth daily      omeprazole (PriLOSEC) 20 mg delayed release capsule Take 1 capsule (20 mg total) by mouth daily (Patient not taking: Reported on 9/24/2020) 30 capsule 0     No current facility-administered medications for this visit  She has no allergies on file       Review of Systems   Constitutional: Negative  Skin: Positive for wound (Right axillary incision drainage site)  Negative for color change, pallor and rash  Objective:      /79 (BP Location: Left arm, Patient Position: Sitting)   Temp 98 6 °F (37 °C) (Tympanic)   Resp 18   Ht 5' 3" (1 6 m)   Wt 88 9 kg (196 lb)   BMI 34 72 kg/m²          Physical Exam  Vitals signs reviewed  Constitutional:       Appearance: Normal appearance  She is not toxic-appearing or diaphoretic  Skin:     General: Skin is warm  Coloration: Skin is not jaundiced or pale  Findings: No bruising or erythema  Comments: Incision drainage site for axilla completely healed  No active drainage  No erythema  Mild firmness beneath the incision was consistent postop changes  Nontender  Neurological:      Mental Status: She is alert

## 2020-09-24 NOTE — ASSESSMENT & PLAN NOTE
Status post incision drainage of infected sebaceous cyst of the right axilla  On exam all healed up  No active drainage  No erythema  Nontender  Patient instructed to follow up if this cyst feels back up thus we can then completely excise it  Otherwise she can follow up as needed

## 2020-09-26 DIAGNOSIS — M50.30 DEGENERATIVE CERVICAL DISC: ICD-10-CM

## 2020-09-28 RX ORDER — DULOXETIN HYDROCHLORIDE 30 MG/1
CAPSULE, DELAYED RELEASE ORAL
Qty: 30 CAPSULE | Refills: 0 | Status: SHIPPED | OUTPATIENT
Start: 2020-09-28 | End: 2020-10-20

## 2020-10-09 ENCOUNTER — OFFICE VISIT (OUTPATIENT)
Dept: OTOLARYNGOLOGY | Facility: CLINIC | Age: 50
End: 2020-10-09
Payer: COMMERCIAL

## 2020-10-09 VITALS
BODY MASS INDEX: 35.08 KG/M2 | DIASTOLIC BLOOD PRESSURE: 60 MMHG | OXYGEN SATURATION: 98 % | SYSTOLIC BLOOD PRESSURE: 110 MMHG | TEMPERATURE: 98.2 F | WEIGHT: 198 LBS | HEART RATE: 99 BPM | HEIGHT: 63 IN

## 2020-10-09 DIAGNOSIS — H72.91 PERFORATION OF RIGHT TYMPANIC MEMBRANE: Primary | ICD-10-CM

## 2020-10-09 DIAGNOSIS — H61.22 IMPACTED CERUMEN OF LEFT EAR: ICD-10-CM

## 2020-10-09 DIAGNOSIS — H90.A31 MIXED CONDUCTIVE AND SENSORINEURAL HEARING LOSS OF RIGHT EAR WITH RESTRICTED HEARING OF LEFT EAR: ICD-10-CM

## 2020-10-09 PROCEDURE — 99213 OFFICE O/P EST LOW 20 MIN: CPT | Performed by: OTOLARYNGOLOGY

## 2020-10-20 DIAGNOSIS — I10 HYPERTENSION, UNSPECIFIED TYPE: Primary | ICD-10-CM

## 2020-10-20 DIAGNOSIS — E78.5 DYSLIPIDEMIA: ICD-10-CM

## 2020-10-20 DIAGNOSIS — M50.30 DEGENERATIVE CERVICAL DISC: ICD-10-CM

## 2020-10-20 RX ORDER — DULOXETIN HYDROCHLORIDE 30 MG/1
CAPSULE, DELAYED RELEASE ORAL
Qty: 30 CAPSULE | Refills: 0 | Status: SHIPPED | OUTPATIENT
Start: 2020-10-20 | End: 2020-12-07

## 2020-10-20 RX ORDER — AMLODIPINE BESYLATE 10 MG/1
TABLET ORAL
Qty: 30 TABLET | Refills: 0 | Status: SHIPPED | OUTPATIENT
Start: 2020-10-20 | End: 2020-11-23

## 2020-10-20 RX ORDER — ATORVASTATIN CALCIUM 10 MG/1
TABLET, FILM COATED ORAL
Qty: 30 TABLET | Refills: 0 | Status: SHIPPED | OUTPATIENT
Start: 2020-10-20 | End: 2020-11-23

## 2020-11-23 ENCOUNTER — TELEPHONE (OUTPATIENT)
Dept: FAMILY MEDICINE CLINIC | Facility: CLINIC | Age: 50
End: 2020-11-23

## 2020-11-23 DIAGNOSIS — I10 HYPERTENSION, UNSPECIFIED TYPE: ICD-10-CM

## 2020-11-23 DIAGNOSIS — E78.5 DYSLIPIDEMIA: ICD-10-CM

## 2020-11-23 RX ORDER — ATORVASTATIN CALCIUM 10 MG/1
TABLET, FILM COATED ORAL
Qty: 30 TABLET | Refills: 0 | Status: SHIPPED | OUTPATIENT
Start: 2020-11-23 | End: 2020-12-21

## 2020-11-23 RX ORDER — AMLODIPINE BESYLATE 10 MG/1
TABLET ORAL
Qty: 30 TABLET | Refills: 0 | Status: SHIPPED | OUTPATIENT
Start: 2020-11-23 | End: 2020-12-21

## 2020-11-30 ENCOUNTER — TELEPHONE (OUTPATIENT)
Dept: FAMILY MEDICINE CLINIC | Facility: CLINIC | Age: 50
End: 2020-11-30

## 2020-12-07 ENCOUNTER — OFFICE VISIT (OUTPATIENT)
Dept: FAMILY MEDICINE CLINIC | Facility: CLINIC | Age: 50
End: 2020-12-07
Payer: COMMERCIAL

## 2020-12-07 VITALS
TEMPERATURE: 99 F | SYSTOLIC BLOOD PRESSURE: 132 MMHG | DIASTOLIC BLOOD PRESSURE: 74 MMHG | HEIGHT: 63 IN | OXYGEN SATURATION: 81 % | HEART RATE: 84 BPM | RESPIRATION RATE: 20 BRPM | BODY MASS INDEX: 36.14 KG/M2 | WEIGHT: 204 LBS

## 2020-12-07 DIAGNOSIS — M54.50 CHRONIC MIDLINE LOW BACK PAIN WITHOUT SCIATICA: ICD-10-CM

## 2020-12-07 DIAGNOSIS — G89.29 CHRONIC MIDLINE LOW BACK PAIN WITHOUT SCIATICA: ICD-10-CM

## 2020-12-07 DIAGNOSIS — I10 HYPERTENSION, UNSPECIFIED TYPE: ICD-10-CM

## 2020-12-07 DIAGNOSIS — K21.9 GASTROESOPHAGEAL REFLUX DISEASE WITHOUT ESOPHAGITIS: ICD-10-CM

## 2020-12-07 DIAGNOSIS — R68.2 DRY MOUTH: Primary | ICD-10-CM

## 2020-12-07 DIAGNOSIS — F17.200 TOBACCO USE DISORDER: ICD-10-CM

## 2020-12-07 PROCEDURE — 3078F DIAST BP <80 MM HG: CPT | Performed by: FAMILY MEDICINE

## 2020-12-07 PROCEDURE — 3075F SYST BP GE 130 - 139MM HG: CPT | Performed by: FAMILY MEDICINE

## 2020-12-07 PROCEDURE — 4004F PT TOBACCO SCREEN RCVD TLK: CPT | Performed by: FAMILY MEDICINE

## 2020-12-07 PROCEDURE — 99213 OFFICE O/P EST LOW 20 MIN: CPT | Performed by: FAMILY MEDICINE

## 2020-12-07 PROCEDURE — 3008F BODY MASS INDEX DOCD: CPT | Performed by: FAMILY MEDICINE

## 2020-12-08 ENCOUNTER — TELEPHONE (OUTPATIENT)
Dept: ADMINISTRATIVE | Facility: OTHER | Age: 50
End: 2020-12-08

## 2020-12-20 DIAGNOSIS — I10 HYPERTENSION, UNSPECIFIED TYPE: ICD-10-CM

## 2020-12-20 DIAGNOSIS — E78.5 DYSLIPIDEMIA: ICD-10-CM

## 2020-12-21 RX ORDER — ATORVASTATIN CALCIUM 10 MG/1
TABLET, FILM COATED ORAL
Qty: 30 TABLET | Refills: 0 | Status: SHIPPED | OUTPATIENT
Start: 2020-12-21 | End: 2021-01-21

## 2020-12-21 RX ORDER — AMLODIPINE BESYLATE 10 MG/1
TABLET ORAL
Qty: 30 TABLET | Refills: 0 | Status: SHIPPED | OUTPATIENT
Start: 2020-12-21 | End: 2021-01-21

## 2021-01-21 DIAGNOSIS — I10 HYPERTENSION, UNSPECIFIED TYPE: ICD-10-CM

## 2021-01-21 DIAGNOSIS — E78.5 DYSLIPIDEMIA: ICD-10-CM

## 2021-01-21 RX ORDER — AMLODIPINE BESYLATE 10 MG/1
TABLET ORAL
Qty: 30 TABLET | Refills: 0 | Status: SHIPPED | OUTPATIENT
Start: 2021-01-21 | End: 2021-02-08

## 2021-01-21 RX ORDER — ATORVASTATIN CALCIUM 10 MG/1
TABLET, FILM COATED ORAL
Qty: 30 TABLET | Refills: 0 | Status: SHIPPED | OUTPATIENT
Start: 2021-01-21 | End: 2021-02-08

## 2021-02-06 DIAGNOSIS — E78.5 DYSLIPIDEMIA: ICD-10-CM

## 2021-02-06 DIAGNOSIS — I10 HYPERTENSION, UNSPECIFIED TYPE: ICD-10-CM

## 2021-02-08 RX ORDER — AMLODIPINE BESYLATE 10 MG/1
TABLET ORAL
Qty: 30 TABLET | Refills: 0 | Status: SHIPPED | OUTPATIENT
Start: 2021-02-08 | End: 2021-03-19

## 2021-02-08 RX ORDER — ATORVASTATIN CALCIUM 10 MG/1
TABLET, FILM COATED ORAL
Qty: 30 TABLET | Refills: 0 | Status: SHIPPED | OUTPATIENT
Start: 2021-02-08 | End: 2021-03-19

## 2021-03-16 ENCOUNTER — APPOINTMENT (OUTPATIENT)
Dept: LAB | Facility: MEDICAL CENTER | Age: 51
End: 2021-03-16
Payer: COMMERCIAL

## 2021-03-16 ENCOUNTER — OFFICE VISIT (OUTPATIENT)
Dept: RHEUMATOLOGY | Facility: CLINIC | Age: 51
End: 2021-03-16
Payer: COMMERCIAL

## 2021-03-16 VITALS
HEIGHT: 63 IN | HEART RATE: 102 BPM | DIASTOLIC BLOOD PRESSURE: 83 MMHG | BODY MASS INDEX: 36.82 KG/M2 | WEIGHT: 207.8 LBS | TEMPERATURE: 98.5 F | SYSTOLIC BLOOD PRESSURE: 170 MMHG

## 2021-03-16 DIAGNOSIS — Z79.1 NSAID LONG-TERM USE: ICD-10-CM

## 2021-03-16 DIAGNOSIS — M94.0 TIETZE SYNDROME: Primary | ICD-10-CM

## 2021-03-16 LAB
ALBUMIN SERPL BCP-MCNC: 4.2 G/DL (ref 3.5–5)
ALP SERPL-CCNC: 106 U/L (ref 46–116)
ALT SERPL W P-5'-P-CCNC: 26 U/L (ref 12–78)
ANION GAP SERPL CALCULATED.3IONS-SCNC: 2 MMOL/L (ref 4–13)
AST SERPL W P-5'-P-CCNC: 22 U/L (ref 5–45)
BASOPHILS # BLD AUTO: 0.07 THOUSANDS/ΜL (ref 0–0.1)
BASOPHILS NFR BLD AUTO: 1 % (ref 0–1)
BILIRUB SERPL-MCNC: 0.37 MG/DL (ref 0.2–1)
BUN SERPL-MCNC: 11 MG/DL (ref 5–25)
CALCIUM SERPL-MCNC: 9.2 MG/DL (ref 8.3–10.1)
CHLORIDE SERPL-SCNC: 111 MMOL/L (ref 100–108)
CO2 SERPL-SCNC: 28 MMOL/L (ref 21–32)
CREAT SERPL-MCNC: 0.82 MG/DL (ref 0.6–1.3)
EOSINOPHIL # BLD AUTO: 0.14 THOUSAND/ΜL (ref 0–0.61)
EOSINOPHIL NFR BLD AUTO: 2 % (ref 0–6)
ERYTHROCYTE [DISTWIDTH] IN BLOOD BY AUTOMATED COUNT: 14 % (ref 11.6–15.1)
GFR SERPL CREATININE-BSD FRML MDRD: 84 ML/MIN/1.73SQ M
GLUCOSE P FAST SERPL-MCNC: 85 MG/DL (ref 65–99)
HCT VFR BLD AUTO: 45 % (ref 34.8–46.1)
HGB BLD-MCNC: 14.9 G/DL (ref 11.5–15.4)
IMM GRANULOCYTES # BLD AUTO: 0.02 THOUSAND/UL (ref 0–0.2)
IMM GRANULOCYTES NFR BLD AUTO: 0 % (ref 0–2)
LYMPHOCYTES # BLD AUTO: 1.85 THOUSANDS/ΜL (ref 0.6–4.47)
LYMPHOCYTES NFR BLD AUTO: 26 % (ref 14–44)
MCH RBC QN AUTO: 30.2 PG (ref 26.8–34.3)
MCHC RBC AUTO-ENTMCNC: 33.1 G/DL (ref 31.4–37.4)
MCV RBC AUTO: 91 FL (ref 82–98)
MONOCYTES # BLD AUTO: 0.61 THOUSAND/ΜL (ref 0.17–1.22)
MONOCYTES NFR BLD AUTO: 8 % (ref 4–12)
NEUTROPHILS # BLD AUTO: 4.55 THOUSANDS/ΜL (ref 1.85–7.62)
NEUTS SEG NFR BLD AUTO: 63 % (ref 43–75)
NRBC BLD AUTO-RTO: 0 /100 WBCS
PLATELET # BLD AUTO: 238 THOUSANDS/UL (ref 149–390)
PMV BLD AUTO: 10.8 FL (ref 8.9–12.7)
POTASSIUM SERPL-SCNC: 4.3 MMOL/L (ref 3.5–5.3)
PROT SERPL-MCNC: 7.9 G/DL (ref 6.4–8.2)
RBC # BLD AUTO: 4.94 MILLION/UL (ref 3.81–5.12)
SODIUM SERPL-SCNC: 141 MMOL/L (ref 136–145)
WBC # BLD AUTO: 7.24 THOUSAND/UL (ref 4.31–10.16)

## 2021-03-16 PROCEDURE — 80053 COMPREHEN METABOLIC PANEL: CPT | Performed by: INTERNAL MEDICINE

## 2021-03-16 PROCEDURE — 36415 COLL VENOUS BLD VENIPUNCTURE: CPT | Performed by: INTERNAL MEDICINE

## 2021-03-16 PROCEDURE — 3077F SYST BP >= 140 MM HG: CPT | Performed by: INTERNAL MEDICINE

## 2021-03-16 PROCEDURE — 3008F BODY MASS INDEX DOCD: CPT | Performed by: INTERNAL MEDICINE

## 2021-03-16 PROCEDURE — 3079F DIAST BP 80-89 MM HG: CPT | Performed by: INTERNAL MEDICINE

## 2021-03-16 PROCEDURE — 85025 COMPLETE CBC W/AUTO DIFF WBC: CPT | Performed by: INTERNAL MEDICINE

## 2021-03-16 PROCEDURE — 4004F PT TOBACCO SCREEN RCVD TLK: CPT | Performed by: INTERNAL MEDICINE

## 2021-03-16 PROCEDURE — 99214 OFFICE O/P EST MOD 30 MIN: CPT | Performed by: INTERNAL MEDICINE

## 2021-03-16 RX ORDER — CELECOXIB 100 MG/1
100 CAPSULE ORAL 2 TIMES DAILY
Qty: 180 CAPSULE | Refills: 1 | Status: SHIPPED | OUTPATIENT
Start: 2021-03-16 | End: 2021-09-21 | Stop reason: SDUPTHER

## 2021-03-16 NOTE — PROGRESS NOTES
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), which continue to be controlled on celecoxib 100mg po bid prn  Patient has continued needing cyclobenzaprine 10mg po qhs for neck muscle spasms, even if it has been causing dry mouth  Her Raynaud's symptoms continue to be controlled on amlodipine, which she is already on for hypertension  CBC/CMP ordered for routine lab monitoring  Plan:  Diagnoses and all orders for this visit:    Tietze syndrome  -     celecoxib (CeleBREX) 100 mg capsule; Take 1 capsule (100 mg total) by mouth 2 (two) times a day    NSAID long-term use  -     CBC and differential  -     Comprehensive metabolic panel     Follow-up plan: Return to clinic in 6 months      Rheumatic Disease Summary  Brody Client is a 48 y o  female who originally presented 6/5/20 as a Rheumatology consult referred by her PCP Hammad Magana DO for evaluation of possible Teitze syndrome  Patient did seem to have this condition, which is idiopathic in nature, and tends to respond to NSAIDs  It presents with bilateral sternoclavicular swelling and tenderness, heartburn symptoms, and possible radiation of swelling and pain up neck, all of which this patient has  For the time-being, stopped patient's prednisone and started celecoxib 100 mg p o  B i d  She also had some muscle spasm secondary to cervical degenerative disc disease, for which I prescribed cyclobenzaprine 10 mg p o  Q h s  Ordered anti Scl 70 and anticentromere antibody for patient to do when she gets a chance to rule-out diffuse/limited scleroderma as cause of patient's GERD and Raynaud's symptoms; ultimately returned negative  Follow-up 9/14/20:  Tietze syndrome has been controlled since initiating celecoxib, which was continued at 100mg po bid prn, and cyclobenzaprine 10mg po qhs for neck muscle spasms   Her Raynaud's symptoms were controlled on amlodipine, which she is already on for hypertension  Chief Complaint   Raynaud's disease without gangrene   Tietze syndrome     HPI  Irina Lechuga is a 48 y o   female who presents for follow-up of Teitze syndrome  Last clinic visit was 9/14/20  Continues to do well on celecoxib bid  Has been getting dry mouth, and wants to try holding cyclobenzaprine to see if it makes a difference  Raynaud's symptoms being helped by amlodipine  Continues to smoke half PPD of cigarettes  The following portions of the patient's history were reviewed and updated as appropriate: allergies, current medications, past family history, past medical history, past social history, past surgical history and problem list     Review of Systems:   Review of Systems   Constitutional: Negative for chills, fatigue, fever and unexpected weight change  HENT: Negative for mouth sores and trouble swallowing  Dry mouth   Eyes: Negative for pain and visual disturbance  Respiratory: Negative for cough and shortness of breath  Cardiovascular: Negative for chest pain and leg swelling  Gastrointestinal: Negative for constipation and diarrhea  Endocrine: Positive for cold intolerance and polydipsia  Genitourinary: Positive for frequency  Musculoskeletal: Negative for arthralgias, back pain, joint swelling and myalgias  Skin: Positive for color change  Negative for rash  Neurological: Negative for weakness  Hematological: Negative for adenopathy  Psychiatric/Behavioral: Negative for sleep disturbance         Home Medications:    Current Outpatient Medications:     celecoxib (CeleBREX) 100 mg capsule, Take 1 capsule (100 mg total) by mouth 2 (two) times a day, Disp: 180 capsule, Rfl: 1    famotidine (PEPCID) 20 mg tablet, , Disp: , Rfl:     amLODIPine (NORVASC) 10 mg tablet, Take 1 tablet by mouth once daily, Disp: 30 tablet, Rfl: 0    atorvastatin (LIPITOR) 10 mg tablet, Take 1 tablet by mouth once daily, Disp: 30 tablet, Rfl: 0    cyclobenzaprine (FLEXERIL) 10 mg tablet, Take 1 tablet (10 mg total) by mouth daily at bedtime, Disp: 90 tablet, Rfl: 1    omeprazole (PriLOSEC) 20 mg delayed release capsule, Take 1 capsule by mouth once daily, Disp: 30 capsule, Rfl: 0    Objective:    Vitals:    03/16/21 1112   BP: 170/83   BP Location: Right arm   Patient Position: Sitting   Cuff Size: Standard   Pulse: 102   Temp: 98 5 °F (36 9 °C)   TempSrc: Temporal   Weight: 94 3 kg (207 lb 12 8 oz)   Height: 5' 3" (1 6 m)       Physical Exam  Constitutional:       General: She is not in acute distress  Appearance: She is well-developed  HENT:      Head: Normocephalic and atraumatic  Eyes:      General: Lids are normal  No scleral icterus  Conjunctiva/sclera: Conjunctivae normal    Neck:      Musculoskeletal: Neck supple  No muscular tenderness  Cardiovascular:      Rate and Rhythm: Normal rate and regular rhythm  Heart sounds: S1 normal and S2 normal  No murmur  No friction rub  Pulmonary:      Effort: Pulmonary effort is normal  No tachypnea or respiratory distress  Breath sounds: Normal breath sounds  No wheezing, rhonchi or rales  Musculoskeletal:         General: No swelling or tenderness  Skin:     General: Skin is warm and dry  Findings: Rash present  Nails: There is no clubbing  Comments: Bilateral wrist erythematous papules   Neurological:      Mental Status: She is alert  Sensory: No sensory deficit  Psychiatric:         Behavior: Behavior normal  Behavior is cooperative  Reviewed labs and imaging      Imaging:   CXR 4/9/20: unremarkable     Cervical Spine x-rays 4/9/20  IMPRESSION:  Moderately severe chronic disc degeneration from C4 to T1     Head/Neck Soft Tissue Ultrasound 4/9/20  FINDINGS/IMPRESSION:  1   Normal exam   2   Scattered, morphologically-normal anterior cervical chain lymph nodes are demonstrated   These measure up to 5 mm in the short axis   Upper limit of normal in this area is 15 mm   No pathologic lymphadenopathy demonstrated in the anterior or   posterior cervical chains  3   Left thyroid lobe was also imaged and is normal in size and appearance      Labs:   Office Visit on 03/16/2021   Component Date Value Ref Range Status    WBC 03/16/2021 7 24  4 31 - 10 16 Thousand/uL Final    RBC 03/16/2021 4 94  3 81 - 5 12 Million/uL Final    Hemoglobin 03/16/2021 14 9  11 5 - 15 4 g/dL Final    Hematocrit 03/16/2021 45 0  34 8 - 46 1 % Final    MCV 03/16/2021 91  82 - 98 fL Final    MCH 03/16/2021 30 2  26 8 - 34 3 pg Final    MCHC 03/16/2021 33 1  31 4 - 37 4 g/dL Final    RDW 03/16/2021 14 0  11 6 - 15 1 % Final    MPV 03/16/2021 10 8  8 9 - 12 7 fL Final    Platelets 82/05/7325 238  149 - 390 Thousands/uL Final    nRBC 03/16/2021 0  /100 WBCs Final    Neutrophils Relative 03/16/2021 63  43 - 75 % Final    Immat GRANS % 03/16/2021 0  0 - 2 % Final    Lymphocytes Relative 03/16/2021 26  14 - 44 % Final    Monocytes Relative 03/16/2021 8  4 - 12 % Final    Eosinophils Relative 03/16/2021 2  0 - 6 % Final    Basophils Relative 03/16/2021 1  0 - 1 % Final    Neutrophils Absolute 03/16/2021 4 55  1 85 - 7 62 Thousands/µL Final    Immature Grans Absolute 03/16/2021 0 02  0 00 - 0 20 Thousand/uL Final    Lymphocytes Absolute 03/16/2021 1 85  0 60 - 4 47 Thousands/µL Final    Monocytes Absolute 03/16/2021 0 61  0 17 - 1 22 Thousand/µL Final    Eosinophils Absolute 03/16/2021 0 14  0 00 - 0 61 Thousand/µL Final    Basophils Absolute 03/16/2021 0 07  0 00 - 0 10 Thousands/µL Final    Sodium 03/16/2021 141  136 - 145 mmol/L Final    Potassium 03/16/2021 4 3  3 5 - 5 3 mmol/L Final    Chloride 03/16/2021 111* 100 - 108 mmol/L Final    CO2 03/16/2021 28  21 - 32 mmol/L Final    ANION GAP 03/16/2021 2* 4 - 13 mmol/L Final    BUN 03/16/2021 11  5 - 25 mg/dL Final    Creatinine 03/16/2021 0 82  0 60 - 1 30 mg/dL Final Standardized to IDMS reference method    Glucose, Fasting 03/16/2021 85  65 - 99 mg/dL Final    Specimen collection should occur prior to Sulfasalazine administration due to the potential for falsely depressed results  Specimen collection should occur prior to Sulfapyridine administration due to the potential for falsely elevated results   Calcium 03/16/2021 9 2  8 3 - 10 1 mg/dL Final    AST 03/16/2021 22  5 - 45 U/L Final    Specimen collection should occur prior to Sulfasalazine administration due to the potential for falsely depressed results   ALT 03/16/2021 26  12 - 78 U/L Final    Specimen collection should occur prior to Sulfasalazine and/or Sulfapyridine administration due to the potential for falsely depressed results   Alkaline Phosphatase 03/16/2021 106  46 - 116 U/L Final    Total Protein 03/16/2021 7 9  6 4 - 8 2 g/dL Final    Albumin 03/16/2021 4 2  3 5 - 5 0 g/dL Final    Total Bilirubin 03/16/2021 0 37  0 20 - 1 00 mg/dL Final    Use of this assay is not recommended for patients undergoing treatment with eltrombopag due to the potential for falsely elevated results      eGFR 03/16/2021 84  ml/min/1 73sq m Final   Office Visit on 06/05/2020   Component Date Value Ref Range Status    Anti-Centromere B Antibodies 09/14/2020 <0 2  0 0 - 0 9 AI Final    Scleroderma SCL-70 09/14/2020 <0 2  0 0 - 0 9 AI Final   Appointment on 06/02/2020   Component Date Value Ref Range Status    WBC 06/02/2020 6 72  4 31 - 10 16 Thousand/uL Final    RBC 06/02/2020 4 56  3 81 - 5 12 Million/uL Final    Hemoglobin 06/02/2020 14 3  11 5 - 15 4 g/dL Final    Hematocrit 06/02/2020 42 2  34 8 - 46 1 % Final    MCV 06/02/2020 93  82 - 98 fL Final    MCH 06/02/2020 31 4  26 8 - 34 3 pg Final    MCHC 06/02/2020 33 9  31 4 - 37 4 g/dL Final    RDW 06/02/2020 13 2  11 6 - 15 1 % Final    MPV 06/02/2020 10 7  8 9 - 12 7 fL Final    Platelets 44/26/8183 226  149 - 390 Thousands/uL Final    nRBC 06/02/2020 0  /100 WBCs Final    Neutrophils Relative 06/02/2020 62  43 - 75 % Final    Immat GRANS % 06/02/2020 0  0 - 2 % Final    Lymphocytes Relative 06/02/2020 29  14 - 44 % Final    Monocytes Relative 06/02/2020 7  4 - 12 % Final    Eosinophils Relative 06/02/2020 1  0 - 6 % Final    Basophils Relative 06/02/2020 1  0 - 1 % Final    Neutrophils Absolute 06/02/2020 4 15  1 85 - 7 62 Thousands/µL Final    Immature Grans Absolute 06/02/2020 0 01  0 00 - 0 20 Thousand/uL Final    Lymphocytes Absolute 06/02/2020 1 92  0 60 - 4 47 Thousands/µL Final    Monocytes Absolute 06/02/2020 0 48  0 17 - 1 22 Thousand/µL Final    Eosinophils Absolute 06/02/2020 0 09  0 00 - 0 61 Thousand/µL Final    Basophils Absolute 06/02/2020 0 07  0 00 - 0 10 Thousands/µL Final    Sed Rate 06/02/2020 20  0 - 20 mm/hour Final    Lyme IgG/IgM Ab 06/02/2020 <0 91  0 00 - 0 90 ISR Final                                    Negative         <0 91                                  Equivocal  0 91 - 1 09                                  Positive         >1 09    Rheumatoid Factor 06/02/2020 Negative  Negative Final   Admission on 05/02/2020, Discharged on 05/02/2020   Component Date Value Ref Range Status    WBC 05/02/2020 7 92  4 31 - 10 16 Thousand/uL Final    RBC 05/02/2020 4 83  3 81 - 5 12 Million/uL Final    Hemoglobin 05/02/2020 15 2  11 5 - 15 4 g/dL Final    Hematocrit 05/02/2020 43 4  34 8 - 46 1 % Final    MCV 05/02/2020 90  82 - 98 fL Final    MCH 05/02/2020 31 5  26 8 - 34 3 pg Final    MCHC 05/02/2020 35 0  31 4 - 37 4 g/dL Final    RDW 05/02/2020 12 5  11 6 - 15 1 % Final    MPV 05/02/2020 9 4  8 9 - 12 7 fL Final    Platelets 34/44/2776 271  149 - 390 Thousands/uL Final    nRBC 05/02/2020 0  /100 WBCs Final    Neutrophils Relative 05/02/2020 72  43 - 75 % Final    Immat GRANS % 05/02/2020 0  0 - 2 % Final    Lymphocytes Relative 05/02/2020 19  14 - 44 % Final    Monocytes Relative 05/02/2020 8  4 - 12 % Final    Eosinophils Relative 05/02/2020 0  0 - 6 % Final    Basophils Relative 05/02/2020 1  0 - 1 % Final    Neutrophils Absolute 05/02/2020 5 72  1 85 - 7 62 Thousands/µL Final    Immature Grans Absolute 05/02/2020 0 03  0 00 - 0 20 Thousand/uL Final    Lymphocytes Absolute 05/02/2020 1 47  0 60 - 4 47 Thousands/µL Final    Monocytes Absolute 05/02/2020 0 62  0 17 - 1 22 Thousand/µL Final    Eosinophils Absolute 05/02/2020 0 01  0 00 - 0 61 Thousand/µL Final    Basophils Absolute 05/02/2020 0 07  0 00 - 0 10 Thousands/µL Final    Sodium 05/02/2020 137  136 - 145 mmol/L Final    Potassium 05/02/2020 3 3* 3 5 - 5 3 mmol/L Final    Chloride 05/02/2020 100  100 - 108 mmol/L Final    CO2 05/02/2020 25  21 - 32 mmol/L Final    ANION GAP 05/02/2020 12  4 - 13 mmol/L Final    BUN 05/02/2020 7  5 - 25 mg/dL Final    Creatinine 05/02/2020 0 88  0 60 - 1 30 mg/dL Final    Standardized to IDMS reference method    Glucose 05/02/2020 101  65 - 140 mg/dL Final      If the patient is fasting, the ADA then defines impaired fasting glucose as > 100 mg/dL and diabetes as > or equal to 123 mg/dL  Specimen collection should occur prior to Sulfasalazine administration due to the potential for falsely depressed results  Specimen collection should occur prior to Sulfapyridine administration due to the potential for falsely elevated results   Calcium 05/02/2020 9 3  8 3 - 10 1 mg/dL Final    AST 05/02/2020 20  5 - 45 U/L Final      Specimen collection should occur prior to Sulfasalazine administration due to the potential for falsely depressed results   ALT 05/02/2020 21  12 - 78 U/L Final      Specimen collection should occur prior to Sulfasalazine administration due to the potential for falsely depressed results       Alkaline Phosphatase 05/02/2020 92  46 - 116 U/L Final    Total Protein 05/02/2020 7 9  6 4 - 8 2 g/dL Final    Albumin 05/02/2020 4 2  3 5 - 5 0 g/dL Final    Total Bilirubin 05/02/2020 0 40 0 20 - 1 00 mg/dL Final    eGFR 05/02/2020 77  ml/min/1 73sq m Final    Troponin I 05/02/2020 <0 02  <=0 04 ng/mL Final    3Autovalidation override  [employer] Chemistry analyzer 99% cutoff is > 0 04 ng/mL in network labs     o cTnI 99% cutoff is useful only when applied to patients in the clinical setting of myocardial ischemia   o cTnI 99% cutoff should be interpreted in the context of clinical history, ECG findings and possibly cardiac imaging to establish correct diagnosis  o cTnI 99% cutoff may be suggestive but clearly not indicative of a coronary event without the clinical setting of myocardial ischemia        Magnesium 05/02/2020 2 0  1 6 - 2 6 mg/dL Final    PTT 05/02/2020 28  23 - 37 seconds Final    Therapeutic Heparin Range =  60-90 seconds    Protime 05/02/2020 14 0  11 6 - 14 5 seconds Final    INR 05/02/2020 1 08  0 84 - 1 19 Final    SARS-CoV-2 05/02/2020 Negative  Negative Final    Color, UA 05/02/2020 Yellow   Final    Clarity, UA 05/02/2020 Clear   Final    Specific Gravity, UA 05/02/2020 <=1 005  1 003 - 1 030 Final    pH, UA 05/02/2020 6 0  4 5, 5 0, 5 5, 6 0, 6 5, 7 0, 7 5, 8 0 Final    Leukocytes, UA 05/02/2020 Trace* Negative Final    Nitrite, UA 05/02/2020 Negative  Negative Final    Protein, UA 05/02/2020 Negative  Negative mg/dl Final    Glucose, UA 05/02/2020 Negative  Negative mg/dl Final    Ketones, UA 05/02/2020 15 (1+)* Negative mg/dl Final    Urobilinogen, UA 05/02/2020 0 2  0 2, 1 0 E U /dl E U /dl Final    Bilirubin, UA 05/02/2020 Negative  Negative Final    Blood, UA 05/02/2020 Trace-Intact* Negative Final    RBC, UA 05/02/2020 0-1* None Seen, 0-5 /hpf Final    WBC, UA 05/02/2020 0-1* None Seen, 0-5, 5-55, 5-65 /hpf Final    Epithelial Cells 05/02/2020 Occasional  None Seen, Occasional /hpf Final    Bacteria, UA 05/02/2020 None Seen  None Seen, Occasional /hpf Final    Ventricular Rate 05/02/2020 125  BPM Final    Atrial Rate 05/02/2020 125  BPM Final    DC Interval 05/02/2020 140  ms Final    QRSD Interval 05/02/2020 70  ms Final    QT Interval 05/02/2020 302  ms Final    QTC Interval 05/02/2020 435  ms Final    P Axis 05/02/2020 54  degrees Final    QRS Axis 05/02/2020 8  degrees Final    T Wave [place] 05/02/2020 11  degrees Final   Office Visit on 05/02/2020   Component Date Value Ref Range Status    Ventricular Rate 05/02/2020 131  BPM Final    Atrial Rate 05/02/2020 131  BPM Final    IL Interval 05/02/2020 144  ms Final    QRSD Interval 05/02/2020 74  ms Final    QT Interval 05/02/2020 282  ms Final    QTC Interval 05/02/2020 416  ms Final    P Axis 05/02/2020 49  degrees Final    QRS Axis 05/02/2020 11  degrees Final    T Wave [place] 05/02/2020 11  degrees Final   Appointment on 04/09/2020   Component Date Value Ref Range Status    WBC 04/09/2020 13 81* 4 31 - 10 16 Thousand/uL Final    RBC 04/09/2020 4 90  3 81 - 5 12 Million/uL Final    Hemoglobin 04/09/2020 15 4  11 5 - 15 4 g/dL Final    Hematocrit 04/09/2020 45 0  34 8 - 46 1 % Final    MCV 04/09/2020 92  82 - 98 fL Final    MCH 04/09/2020 31 4  26 8 - 34 3 pg Final    MCHC 04/09/2020 34 2  31 4 - 37 4 g/dL Final    RDW 04/09/2020 13 3  11 6 - 15 1 % Final    MPV 04/09/2020 9 7  8 9 - 12 7 fL Final    Platelets 43/33/0850 286  149 - 390 Thousands/uL Final    nRBC 04/09/2020 0  /100 WBCs Final    Neutrophils Relative 04/09/2020 79* 43 - 75 % Final    Immat GRANS % 04/09/2020 1  0 - 2 % Final    Lymphocytes Relative 04/09/2020 13* 14 - 44 % Final    Monocytes Relative 04/09/2020 6  4 - 12 % Final    Eosinophils Relative 04/09/2020 0  0 - 6 % Final    Basophils Relative 04/09/2020 1  0 - 1 % Final    Neutrophils Absolute 04/09/2020 11 00* 1 85 - 7 62 Thousands/µL Final    Immature Grans Absolute 04/09/2020 0 15  0 00 - 0 20 Thousand/uL Final    Lymphocytes Absolute 04/09/2020 1 78  0 60 - 4 47 Thousands/µL Final    Monocytes Absolute 04/09/2020 0 81  0 17 - 1 22 Thousand/µL Final  Eosinophils Absolute 04/09/2020 0 00  0 00 - 0 61 Thousand/µL Final    Basophils Absolute 04/09/2020 0 07  0 00 - 0 10 Thousands/µL Final    Sed Rate 04/09/2020 2  0 - 20 mm/hour Final    ANNAMARIE 04/09/2020 Negative  Negative Final    CRP 04/09/2020 <0 5  <3 0 mg/L Final    3    TSH 3RD GENERATON 04/09/2020 1 588  0 358 - 3 740 uIU/mL Final      The recommended reference ranges for TSH during pregnancy are as follows:   First trimester 0 1 to 2 5 uIU/mL   Second trimester  0 2 to 3 0 uIU/mL   Third trimester 0 3 to 3 0 uIU/m    Note: Normal ranges may not apply to patients who are transgender, non-binary, or whose legal sex, sex at birth, and gender identity differ  Using supplements with high doses of biotin 20 to more than 300 times greater than the adequate daily intake for adults of 30 mcg/day as established by the [place] [employer], can cause falsely depress results

## 2021-03-16 NOTE — PATIENT INSTRUCTIONS
Do labs  Continue celecoxib twice a day  Hold cyclobenzaprine for a few nights, update me on how you do    Return to clinic in 6 months

## 2021-03-19 DIAGNOSIS — I10 HYPERTENSION, UNSPECIFIED TYPE: ICD-10-CM

## 2021-03-19 DIAGNOSIS — M50.30 DEGENERATIVE CERVICAL DISC: ICD-10-CM

## 2021-03-19 DIAGNOSIS — K21.9 GASTROESOPHAGEAL REFLUX DISEASE: ICD-10-CM

## 2021-03-19 DIAGNOSIS — E78.5 DYSLIPIDEMIA: ICD-10-CM

## 2021-03-19 DIAGNOSIS — M62.838 MUSCLE SPASM: ICD-10-CM

## 2021-03-19 RX ORDER — OMEPRAZOLE 20 MG/1
CAPSULE, DELAYED RELEASE ORAL
Qty: 30 CAPSULE | Refills: 0 | Status: SHIPPED | OUTPATIENT
Start: 2021-03-19 | End: 2021-04-13

## 2021-03-19 RX ORDER — AMLODIPINE BESYLATE 10 MG/1
TABLET ORAL
Qty: 30 TABLET | Refills: 0 | Status: SHIPPED | OUTPATIENT
Start: 2021-03-19 | End: 2021-04-13

## 2021-03-19 RX ORDER — CYCLOBENZAPRINE HCL 10 MG
10 TABLET ORAL
Qty: 90 TABLET | Refills: 1 | Status: SHIPPED | OUTPATIENT
Start: 2021-03-19 | End: 2021-09-21 | Stop reason: SDUPTHER

## 2021-03-19 RX ORDER — ATORVASTATIN CALCIUM 10 MG/1
TABLET, FILM COATED ORAL
Qty: 30 TABLET | Refills: 0 | Status: SHIPPED | OUTPATIENT
Start: 2021-03-19 | End: 2021-04-13

## 2021-04-01 ENCOUNTER — TELEPHONE (OUTPATIENT)
Dept: MAMMOGRAPHY | Facility: HOSPITAL | Age: 51
End: 2021-04-01

## 2021-04-06 DIAGNOSIS — Z20.822 CLOSE EXPOSURE TO 2019 NOVEL CORONAVIRUS: ICD-10-CM

## 2021-04-06 DIAGNOSIS — Z20.822 CLOSE EXPOSURE TO 2019 NOVEL CORONAVIRUS: Primary | ICD-10-CM

## 2021-04-06 PROCEDURE — U0003 INFECTIOUS AGENT DETECTION BY NUCLEIC ACID (DNA OR RNA); SEVERE ACUTE RESPIRATORY SYNDROME CORONAVIRUS 2 (SARS-COV-2) (CORONAVIRUS DISEASE [COVID-19]), AMPLIFIED PROBE TECHNIQUE, MAKING USE OF HIGH THROUGHPUT TECHNOLOGIES AS DESCRIBED BY CMS-2020-01-R: HCPCS | Performed by: FAMILY MEDICINE

## 2021-04-06 PROCEDURE — U0005 INFEC AGEN DETEC AMPLI PROBE: HCPCS | Performed by: FAMILY MEDICINE

## 2021-04-07 LAB — SARS-COV-2 RNA RESP QL NAA+PROBE: NEGATIVE

## 2021-04-13 DIAGNOSIS — I10 HYPERTENSION, UNSPECIFIED TYPE: ICD-10-CM

## 2021-04-13 DIAGNOSIS — E78.5 DYSLIPIDEMIA: ICD-10-CM

## 2021-04-13 DIAGNOSIS — K21.9 GASTROESOPHAGEAL REFLUX DISEASE: ICD-10-CM

## 2021-04-13 RX ORDER — AMLODIPINE BESYLATE 10 MG/1
TABLET ORAL
Qty: 30 TABLET | Refills: 0 | Status: SHIPPED | OUTPATIENT
Start: 2021-04-13 | End: 2021-05-09 | Stop reason: SDUPTHER

## 2021-04-13 RX ORDER — ATORVASTATIN CALCIUM 10 MG/1
TABLET, FILM COATED ORAL
Qty: 30 TABLET | Refills: 0 | Status: SHIPPED | OUTPATIENT
Start: 2021-04-13 | End: 2021-05-09 | Stop reason: SDUPTHER

## 2021-04-13 RX ORDER — OMEPRAZOLE 20 MG/1
CAPSULE, DELAYED RELEASE ORAL
Qty: 30 CAPSULE | Refills: 0 | Status: SHIPPED | OUTPATIENT
Start: 2021-04-13 | End: 2021-05-09 | Stop reason: SDUPTHER

## 2021-04-23 ENCOUNTER — TELEPHONE (OUTPATIENT)
Dept: OBGYN CLINIC | Facility: HOSPITAL | Age: 51
End: 2021-04-23

## 2021-04-23 NOTE — TELEPHONE ENCOUNTER
Please let her know that it is totally fine for her to get the COVID vaccine; it should not make anything worse

## 2021-04-23 NOTE — TELEPHONE ENCOUNTER
Dr Breaux Ours    Patient is asking if she can get the Covid vaccine considering her issues with Reynauds and immune issues? She would like to get it        # [phone]

## 2021-05-08 DIAGNOSIS — K21.9 GASTROESOPHAGEAL REFLUX DISEASE: ICD-10-CM

## 2021-05-08 DIAGNOSIS — I10 HYPERTENSION, UNSPECIFIED TYPE: ICD-10-CM

## 2021-05-08 DIAGNOSIS — E78.5 DYSLIPIDEMIA: ICD-10-CM

## 2021-05-09 RX ORDER — AMLODIPINE BESYLATE 10 MG/1
TABLET ORAL
Qty: 30 TABLET | Refills: 0 | Status: SHIPPED | OUTPATIENT
Start: 2021-05-09 | End: 2021-06-15

## 2021-05-09 RX ORDER — OMEPRAZOLE 20 MG/1
CAPSULE, DELAYED RELEASE ORAL
Qty: 30 CAPSULE | Refills: 0 | Status: SHIPPED | OUTPATIENT
Start: 2021-05-09 | End: 2021-06-15

## 2021-05-09 RX ORDER — ATORVASTATIN CALCIUM 10 MG/1
TABLET, FILM COATED ORAL
Qty: 30 TABLET | Refills: 0 | Status: SHIPPED | OUTPATIENT
Start: 2021-05-09 | End: 2021-06-15

## 2021-05-10 ENCOUNTER — TELEPHONE (OUTPATIENT)
Dept: OBGYN CLINIC | Facility: HOSPITAL | Age: 51
End: 2021-05-10

## 2021-05-10 NOTE — TELEPHONE ENCOUNTER
Please let her know to hold a dose of celebrex prior to each vaccine, can take after, but she should get the vaccine

## 2021-05-10 NOTE — TELEPHONE ENCOUNTER
Patient sees Dr Herrera    Patient would like to know if she should get the Covid Vaccine because she takes Celebrex     Please Advise    cb - [phone]

## 2021-05-12 ENCOUNTER — IMMUNIZATIONS (OUTPATIENT)
Dept: FAMILY MEDICINE CLINIC | Facility: HOSPITAL | Age: 51
End: 2021-05-12

## 2021-05-12 ENCOUNTER — TELEPHONE (OUTPATIENT)
Dept: FAMILY MEDICINE CLINIC | Facility: CLINIC | Age: 51
End: 2021-05-12

## 2021-05-12 DIAGNOSIS — Z23 ENCOUNTER FOR IMMUNIZATION: Primary | ICD-10-CM

## 2021-05-12 PROCEDURE — 0011A SARS-COV-2 / COVID-19 MRNA VACCINE (MODERNA) 100 MCG: CPT

## 2021-05-12 PROCEDURE — 91301 SARS-COV-2 / COVID-19 MRNA VACCINE (MODERNA) 100 MCG: CPT

## 2021-06-10 ENCOUNTER — IMMUNIZATIONS (OUTPATIENT)
Dept: FAMILY MEDICINE CLINIC | Facility: HOSPITAL | Age: 51
End: 2021-06-10

## 2021-06-10 DIAGNOSIS — Z23 ENCOUNTER FOR IMMUNIZATION: Primary | ICD-10-CM

## 2021-06-10 PROCEDURE — 91301 SARS-COV-2 / COVID-19 MRNA VACCINE (MODERNA) 100 MCG: CPT

## 2021-06-10 PROCEDURE — 0012A SARS-COV-2 / COVID-19 MRNA VACCINE (MODERNA) 100 MCG: CPT

## 2021-06-15 DIAGNOSIS — I10 HYPERTENSION, UNSPECIFIED TYPE: ICD-10-CM

## 2021-06-15 DIAGNOSIS — E78.5 DYSLIPIDEMIA: ICD-10-CM

## 2021-06-15 DIAGNOSIS — K21.9 GASTROESOPHAGEAL REFLUX DISEASE: ICD-10-CM

## 2021-06-15 RX ORDER — ATORVASTATIN CALCIUM 10 MG/1
TABLET, FILM COATED ORAL
Qty: 30 TABLET | Refills: 0 | Status: SHIPPED | OUTPATIENT
Start: 2021-06-15 | End: 2021-07-23

## 2021-06-15 RX ORDER — AMLODIPINE BESYLATE 10 MG/1
TABLET ORAL
Qty: 30 TABLET | Refills: 0 | Status: SHIPPED | OUTPATIENT
Start: 2021-06-15 | End: 2021-07-23

## 2021-06-15 RX ORDER — OMEPRAZOLE 20 MG/1
CAPSULE, DELAYED RELEASE ORAL
Qty: 30 CAPSULE | Refills: 0 | Status: SHIPPED | OUTPATIENT
Start: 2021-06-15 | End: 2021-07-06

## 2021-07-05 DIAGNOSIS — K21.9 GASTROESOPHAGEAL REFLUX DISEASE: ICD-10-CM

## 2021-07-06 RX ORDER — OMEPRAZOLE 20 MG/1
CAPSULE, DELAYED RELEASE ORAL
Qty: 30 CAPSULE | Refills: 0 | Status: SHIPPED | OUTPATIENT
Start: 2021-07-06 | End: 2021-08-13

## 2021-07-23 DIAGNOSIS — I10 HYPERTENSION, UNSPECIFIED TYPE: ICD-10-CM

## 2021-07-23 DIAGNOSIS — E78.5 DYSLIPIDEMIA: ICD-10-CM

## 2021-07-23 RX ORDER — AMLODIPINE BESYLATE 10 MG/1
TABLET ORAL
Qty: 30 TABLET | Refills: 0 | Status: SHIPPED | OUTPATIENT
Start: 2021-07-23 | End: 2021-08-13

## 2021-07-23 RX ORDER — ATORVASTATIN CALCIUM 10 MG/1
TABLET, FILM COATED ORAL
Qty: 30 TABLET | Refills: 0 | Status: SHIPPED | OUTPATIENT
Start: 2021-07-23 | End: 2021-08-13

## 2021-08-06 ENCOUNTER — HOSPITAL ENCOUNTER (OUTPATIENT)
Dept: NON INVASIVE DIAGNOSTICS | Facility: HOSPITAL | Age: 51
Discharge: HOME | End: 2021-08-06
Attending: OTOLARYNGOLOGY
Payer: COMMERCIAL

## 2021-08-06 ENCOUNTER — APPOINTMENT (OUTPATIENT)
Dept: LAB | Facility: HOSPITAL | Age: 51
End: 2021-08-06
Attending: OTOLARYNGOLOGY
Payer: COMMERCIAL

## 2021-08-06 DIAGNOSIS — H72.91 PERFORATION OF RIGHT TYMPANIC MEMBRANE: ICD-10-CM

## 2021-08-06 DIAGNOSIS — H91.8X1 OTHER SPECIFIED HEARING LOSS OF RIGHT EAR, UNSPECIFIED HEARING STATUS ON CONTRALATERAL SIDE: ICD-10-CM

## 2021-08-06 LAB
ANION GAP SERPL CALCULATED.3IONS-SCNC: 8 MMOL/L (ref 4–13)
BASOPHILS # BLD AUTO: 0.08 THOUSANDS/ΜL (ref 0–0.1)
BASOPHILS NFR BLD AUTO: 1 % (ref 0–1)
BUN SERPL-MCNC: 15 MG/DL (ref 5–25)
CALCIUM SERPL-MCNC: 9.2 MG/DL (ref 8.3–10.1)
CHLORIDE SERPL-SCNC: 108 MMOL/L (ref 100–108)
CO2 SERPL-SCNC: 26 MMOL/L (ref 21–32)
CREAT SERPL-MCNC: 0.83 MG/DL (ref 0.6–1.3)
EOSINOPHIL # BLD AUTO: 0.08 THOUSAND/ΜL (ref 0–0.61)
EOSINOPHIL NFR BLD AUTO: 1 % (ref 0–6)
ERYTHROCYTE [DISTWIDTH] IN BLOOD BY AUTOMATED COUNT: 13.5 % (ref 11.6–15.1)
GFR SERPL CREATININE-BSD FRML MDRD: 82 ML/MIN/1.73SQ M
GLUCOSE SERPL-MCNC: 90 MG/DL (ref 65–140)
HCT VFR BLD AUTO: 41.9 % (ref 34.8–46.1)
HGB BLD-MCNC: 13.9 G/DL (ref 11.5–15.4)
IMM GRANULOCYTES # BLD AUTO: 0.02 THOUSAND/UL (ref 0–0.2)
IMM GRANULOCYTES NFR BLD AUTO: 0 % (ref 0–2)
LYMPHOCYTES # BLD AUTO: 2.16 THOUSANDS/ΜL (ref 0.6–4.47)
LYMPHOCYTES NFR BLD AUTO: 35 % (ref 14–44)
MCH RBC QN AUTO: 30 PG (ref 26.8–34.3)
MCHC RBC AUTO-ENTMCNC: 33.2 G/DL (ref 31.4–37.4)
MCV RBC AUTO: 91 FL (ref 82–98)
MONOCYTES # BLD AUTO: 0.4 THOUSAND/ΜL (ref 0.17–1.22)
MONOCYTES NFR BLD AUTO: 6 % (ref 4–12)
NEUTROPHILS # BLD AUTO: 3.48 THOUSANDS/ΜL (ref 1.85–7.62)
NEUTS SEG NFR BLD AUTO: 57 % (ref 43–75)
NRBC BLD AUTO-RTO: 0 /100 WBCS
PLATELET # BLD AUTO: 239 THOUSANDS/UL (ref 149–390)
PMV BLD AUTO: 10.2 FL (ref 8.9–12.7)
POTASSIUM SERPL-SCNC: 3.9 MMOL/L (ref 3.5–5.3)
RBC # BLD AUTO: 4.63 MILLION/UL (ref 3.81–5.12)
SODIUM SERPL-SCNC: 142 MMOL/L (ref 136–145)
WBC # BLD AUTO: 6.22 THOUSAND/UL (ref 4.31–10.16)

## 2021-08-06 PROCEDURE — 80048 BASIC METABOLIC PNL TOTAL CA: CPT

## 2021-08-06 PROCEDURE — 93005 ELECTROCARDIOGRAM TRACING: CPT

## 2021-08-06 PROCEDURE — 85025 COMPLETE CBC W/AUTO DIFF WBC: CPT

## 2021-08-06 PROCEDURE — 36415 COLL VENOUS BLD VENIPUNCTURE: CPT

## 2021-08-09 LAB
ATRIAL RATE: 95 BPM
P AXIS: 47 DEGREES
PR INTERVAL: 154 MS
QRS AXIS: 2 DEGREES
QRSD INTERVAL: 78 MS
QT INTERVAL: 342 MS
QTC INTERVAL: 429 MS
T WAVE AXIS: 21 DEGREES
VENTRICULAR RATE: 95 BPM

## 2021-08-09 PROCEDURE — 93010 ELECTROCARDIOGRAM REPORT: CPT | Performed by: INTERNAL MEDICINE

## 2021-08-13 DIAGNOSIS — K21.9 GASTROESOPHAGEAL REFLUX DISEASE: ICD-10-CM

## 2021-08-13 DIAGNOSIS — E78.5 DYSLIPIDEMIA: ICD-10-CM

## 2021-08-13 DIAGNOSIS — I10 HYPERTENSION, UNSPECIFIED TYPE: ICD-10-CM

## 2021-08-13 RX ORDER — ATORVASTATIN CALCIUM 10 MG/1
TABLET, FILM COATED ORAL
Qty: 30 TABLET | Refills: 0 | Status: SHIPPED | OUTPATIENT
Start: 2021-08-13 | End: 2021-09-16

## 2021-08-13 RX ORDER — AMLODIPINE BESYLATE 10 MG/1
TABLET ORAL
Qty: 30 TABLET | Refills: 0 | Status: SHIPPED | OUTPATIENT
Start: 2021-08-13 | End: 2021-09-16

## 2021-08-13 RX ORDER — OMEPRAZOLE 20 MG/1
CAPSULE, DELAYED RELEASE ORAL
Qty: 30 CAPSULE | Refills: 0 | Status: SHIPPED | OUTPATIENT
Start: 2021-08-13 | End: 2021-09-16

## 2021-09-02 NOTE — PRE-PROCEDURE INSTRUCTIONS
Pre-Surgery Instructions:   Medication Instructions    amLODIPine (NORVASC) 10 mg tablet Instructed patient per Anesthesia Guidelines   atorvastatin (LIPITOR) 10 mg tablet Instructed patient per Anesthesia Guidelines   celecoxib (CeleBREX) 100 mg capsule Instructed patient per Anesthesia Guidelines   cyclobenzaprine (FLEXERIL) 10 mg tablet Instructed patient per Anesthesia Guidelines   famotidine (PEPCID) 20 mg tablet Instructed patient per Anesthesia Guidelines   omeprazole (PriLOSEC) 20 mg delayed release capsule Instructed patient per Anesthesia Guidelines   psyllium (METAMUCIL) 58 6 % packet Instructed patient per Anesthesia Guidelines       Pt verbalizes understanding of the following:  - NPO after MN prior, may take morning meds with a sip of water  - Bathing instructions to shower in the am, no lotion/ powder/ sprays/ deodorant/ body piercings/ jewelry  - Hold OTC vit/ suppl/ herbals x7 days  - Avoid ASA & NSAIDs x5 days

## 2021-09-13 ENCOUNTER — ANESTHESIA EVENT (OUTPATIENT)
Dept: PERIOP | Facility: HOSPITAL | Age: 51
End: 2021-09-13
Payer: COMMERCIAL

## 2021-09-13 ENCOUNTER — HOSPITAL ENCOUNTER (OUTPATIENT)
Facility: HOSPITAL | Age: 51
Setting detail: OUTPATIENT SURGERY
Discharge: HOME | End: 2021-09-13
Attending: SPECIALIST | Admitting: SPECIALIST
Payer: COMMERCIAL

## 2021-09-13 ENCOUNTER — ANESTHESIA (OUTPATIENT)
Dept: PERIOP | Facility: HOSPITAL | Age: 51
End: 2021-09-13
Payer: COMMERCIAL

## 2021-09-13 VITALS
BODY MASS INDEX: 36.68 KG/M2 | OXYGEN SATURATION: 96 % | HEIGHT: 63 IN | SYSTOLIC BLOOD PRESSURE: 122 MMHG | WEIGHT: 207 LBS | RESPIRATION RATE: 18 BRPM | HEART RATE: 82 BPM | TEMPERATURE: 97.3 F | DIASTOLIC BLOOD PRESSURE: 72 MMHG

## 2021-09-13 DIAGNOSIS — T65.291A TOXIC EFFECT OF TOBACCO AND NICOTINE, ACCIDENTAL OR UNINTENTIONAL, INITIAL ENCOUNTER: Primary | ICD-10-CM

## 2021-09-13 DIAGNOSIS — N87.1 MODERATE CERVICAL DYSPLASIA: ICD-10-CM

## 2021-09-13 PROBLEM — N87.0 MILD CERVICAL DYSPLASIA: Status: ACTIVE | Noted: 2021-09-13

## 2021-09-13 LAB
EXT PREGNANCY TEST URINE: NEGATIVE
EXT. CONTROL: NORMAL

## 2021-09-13 PROCEDURE — 81025 URINE PREGNANCY TEST: CPT | Performed by: SPECIALIST

## 2021-09-13 PROCEDURE — 88307 TISSUE EXAM BY PATHOLOGIST: CPT | Performed by: PATHOLOGY

## 2021-09-13 PROCEDURE — 88305 TISSUE EXAM BY PATHOLOGIST: CPT | Performed by: PATHOLOGY

## 2021-09-13 RX ORDER — ONDANSETRON 2 MG/ML
4 INJECTION INTRAMUSCULAR; INTRAVENOUS ONCE AS NEEDED
Status: DISCONTINUED | OUTPATIENT
Start: 2021-09-13 | End: 2021-09-13 | Stop reason: HOSPADM

## 2021-09-13 RX ORDER — ONDANSETRON 2 MG/ML
4 INJECTION INTRAMUSCULAR; INTRAVENOUS EVERY 6 HOURS PRN
Status: DISCONTINUED | OUTPATIENT
Start: 2021-09-13 | End: 2021-09-13 | Stop reason: HOSPADM

## 2021-09-13 RX ORDER — KETAMINE HCL IN NACL, ISO-OSM 100MG/10ML
SYRINGE (ML) INJECTION AS NEEDED
Status: DISCONTINUED | OUTPATIENT
Start: 2021-09-13 | End: 2021-09-13

## 2021-09-13 RX ORDER — FENTANYL CITRATE 50 UG/ML
INJECTION, SOLUTION INTRAMUSCULAR; INTRAVENOUS AS NEEDED
Status: DISCONTINUED | OUTPATIENT
Start: 2021-09-13 | End: 2021-09-13

## 2021-09-13 RX ORDER — ONDANSETRON 2 MG/ML
INJECTION INTRAMUSCULAR; INTRAVENOUS AS NEEDED
Status: DISCONTINUED | OUTPATIENT
Start: 2021-09-13 | End: 2021-09-13

## 2021-09-13 RX ORDER — HYDROMORPHONE HCL/PF 1 MG/ML
0.5 SYRINGE (ML) INJECTION
Status: DISCONTINUED | OUTPATIENT
Start: 2021-09-13 | End: 2021-09-13 | Stop reason: HOSPADM

## 2021-09-13 RX ORDER — SODIUM CHLORIDE, SODIUM LACTATE, POTASSIUM CHLORIDE, CALCIUM CHLORIDE 600; 310; 30; 20 MG/100ML; MG/100ML; MG/100ML; MG/100ML
125 INJECTION, SOLUTION INTRAVENOUS CONTINUOUS
Status: DISCONTINUED | OUTPATIENT
Start: 2021-09-13 | End: 2021-09-13 | Stop reason: HOSPADM

## 2021-09-13 RX ORDER — SODIUM CHLORIDE, SODIUM LACTATE, POTASSIUM CHLORIDE, CALCIUM CHLORIDE 600; 310; 30; 20 MG/100ML; MG/100ML; MG/100ML; MG/100ML
50 INJECTION, SOLUTION INTRAVENOUS CONTINUOUS
Status: DISCONTINUED | OUTPATIENT
Start: 2021-09-13 | End: 2021-09-13 | Stop reason: HOSPADM

## 2021-09-13 RX ORDER — KETOROLAC TROMETHAMINE 30 MG/ML
INJECTION, SOLUTION INTRAMUSCULAR; INTRAVENOUS AS NEEDED
Status: DISCONTINUED | OUTPATIENT
Start: 2021-09-13 | End: 2021-09-13

## 2021-09-13 RX ORDER — MAGNESIUM HYDROXIDE 1200 MG/15ML
LIQUID ORAL AS NEEDED
Status: DISCONTINUED | OUTPATIENT
Start: 2021-09-13 | End: 2021-09-13 | Stop reason: HOSPADM

## 2021-09-13 RX ORDER — FENTANYL CITRATE/PF 50 MCG/ML
50 SYRINGE (ML) INJECTION
Status: DISCONTINUED | OUTPATIENT
Start: 2021-09-13 | End: 2021-09-13 | Stop reason: HOSPADM

## 2021-09-13 RX ORDER — DIPHENHYDRAMINE HYDROCHLORIDE 50 MG/ML
12.5 INJECTION INTRAMUSCULAR; INTRAVENOUS ONCE AS NEEDED
Status: DISCONTINUED | OUTPATIENT
Start: 2021-09-13 | End: 2021-09-13 | Stop reason: HOSPADM

## 2021-09-13 RX ORDER — PROPOFOL 10 MG/ML
INJECTION, EMULSION INTRAVENOUS CONTINUOUS PRN
Status: DISCONTINUED | OUTPATIENT
Start: 2021-09-13 | End: 2021-09-13

## 2021-09-13 RX ORDER — MIDAZOLAM HYDROCHLORIDE 2 MG/2ML
INJECTION, SOLUTION INTRAMUSCULAR; INTRAVENOUS AS NEEDED
Status: DISCONTINUED | OUTPATIENT
Start: 2021-09-13 | End: 2021-09-13

## 2021-09-13 RX ORDER — OXYCODONE HYDROCHLORIDE AND ACETAMINOPHEN 5; 325 MG/1; MG/1
1 TABLET ORAL EVERY 4 HOURS PRN
Status: DISCONTINUED | OUTPATIENT
Start: 2021-09-13 | End: 2021-09-13 | Stop reason: HOSPADM

## 2021-09-13 RX ADMIN — ONDANSETRON 4 MG: 2 INJECTION INTRAMUSCULAR; INTRAVENOUS at 14:17

## 2021-09-13 RX ADMIN — KETOROLAC TROMETHAMINE 30 MG: 30 INJECTION, SOLUTION INTRAMUSCULAR; INTRAVENOUS at 14:17

## 2021-09-13 RX ADMIN — PROPOFOL 120 MCG/KG/MIN: 10 INJECTION, EMULSION INTRAVENOUS at 14:05

## 2021-09-13 RX ADMIN — SODIUM CHLORIDE, SODIUM LACTATE, POTASSIUM CHLORIDE, AND CALCIUM CHLORIDE 50 ML/HR: .6; .31; .03; .02 INJECTION, SOLUTION INTRAVENOUS at 12:20

## 2021-09-13 RX ADMIN — SODIUM CHLORIDE, SODIUM LACTATE, POTASSIUM CHLORIDE, AND CALCIUM CHLORIDE: .6; .31; .03; .02 INJECTION, SOLUTION INTRAVENOUS at 14:02

## 2021-09-13 RX ADMIN — MIDAZOLAM HYDROCHLORIDE 2 MG: 1 INJECTION, SOLUTION INTRAMUSCULAR; INTRAVENOUS at 14:04

## 2021-09-13 RX ADMIN — Medication 30 MG: at 14:14

## 2021-09-13 RX ADMIN — FENTANYL CITRATE 100 MCG: 50 INJECTION INTRAMUSCULAR; INTRAVENOUS at 14:04

## 2021-09-13 RX ADMIN — Medication 20 MG: at 14:15

## 2021-09-13 NOTE — ANESTHESIA PREPROCEDURE EVALUATION
Procedure:  CONE BIOPSY OF THE CERVIX (N/A Cervix)    Relevant Problems   CARDIO   (+) High blood pressure      GI/HEPATIC   (+) Acid reflux   (+) Dysphagia      MUSCULOSKELETAL   (+) Degeneration of intervertebral disc of cervical region   (+) Degenerative cervical disc   (+) Spondylosis of cervical region without myelopathy or radiculopathy      NEURO/PSYCH   (+) Claustrophobia      Active smoker  Smoked AM of surgery     Physical Exam    Airway    Mallampati score: II         Dental   No notable dental hx     Cardiovascular  Rhythm: regular, Rate: normal,     Pulmonary  Pulmonary exam normal     Other Findings        Anesthesia Plan  ASA Score- 2     Anesthesia Type- IV sedation with anesthesia with ASA Monitors  Additional Monitors:   Airway Plan:           Plan Factors-Exercise tolerance (METS): >4 METS  Chart reviewed  Patient summary reviewed  Patient is a current smoker  Patient instructed to abstain from smoking on day of procedure  Patient smoked on day of surgery  Induction- intravenous  Postoperative Plan-     Informed Consent- Anesthetic plan and risks discussed with patient  I personally reviewed this patient with the CRNA  Discussed and agreed on the Anesthesia Plan with the CRNA  Cristino Lawler

## 2021-09-13 NOTE — OP NOTE
OPERATIVE REPORT  PATIENT NAME: John Villalobos    :  1970  MRN: [id number]  Pt Location: [facility] OR ROOM 02    SURGERY DATE: 2021    Surgeon(s) and Role:     Maikel Henao MD - Primary    Preop Diagnosis:  Moderate cervical dysplasia [N87 1]    Post-Op Diagnosis Codes:     * Moderate cervical dysplasia [N87 1]    Procedure(s) (LRB):  CONE BIOPSY OF THE CERVIX (N/A)    Specimen(s):  ID Type Source Tests Collected by Time Destination   1 : ENDOCERVICAL CURRETTINGS Tissue Cervix, Endocervical TISSUE EXAM Marquis More MD 2021 141    2 : CERVICAL CONE BIOPSY Tissue Cervix TISSUE EXAM Marquis More MD 2021 141        Estimated Blood Loss:   Minimal    Drains:  * No LDAs found *    Anesthesia Type:   IV Sedation with Anesthesia    Operative Indications:  Mild cervical dysplasia with positive endocervical curettings       Operative Findings:  Small Hanks cone biopsy instrument used  Complications:   None    Procedure and Technique:  Patient was taken to the operating room and placed in the dorsosupine position  She was then given some IV sedation  Patient was then changed to the dorsal lithotomy position and prepped and draped sterilely for a vaginal procedure  A speculum was placed in the vagina and the cervix was identified  A small Hanks cone biopsy instrument was used to perform the cone biopsy  Dissection started at the 12 o'clock position and rotating clockwise until 12 o'clock position  Endocervical curettings were then obtained from the top of the cone  Hemostasis was noted to be excellent  Pad instrument needle counts are correct intended procedure  Patient tolerated procedure well and was returned to recovery room in stable condition     I was present for the entire procedure    Patient Disposition:  PACU     SIGNATURE: Marquis More MD  DATE: 2021  TIME: 2:20 PM

## 2021-09-13 NOTE — ANESTHESIA POSTPROCEDURE EVALUATION
Post-Op Assessment Note    CV Status:  Stable    Pain management: adequate     Mental Status:  Alert and awake   Hydration Status:  Euvolemic   PONV Controlled:  Controlled   Airway Patency:  Patent      Post Op Vitals Reviewed: Yes      Staff: CRNA         No complications documented      BP      Temp      Pulse    Resp      SpO2

## 2021-09-13 NOTE — DISCHARGE INSTRUCTIONS
Cervical Cone Biopsy   WHAT YOU NEED TO KNOW:   A cervical cone biopsy is surgery to take cells from your cervix  Surgery may be done so cells can be tested for cancer or can be removed before they become cancer  DISCHARGE INSTRUCTIONS:   Call your surgeon or gynecologist if:   · You have white or yellow vaginal discharge  · You have more vaginal bleeding than you were told to expect  · You have a fever  · You have new or increased pain in your lower abdomen and pelvic area  · You have vaginal bleeding, and it is not time for your monthly period  · You feel pain when you urinate, or your urine looks cloudy  · You have questions or concerns about your condition or care  Medicines: You may need any of the following:  · Prescription pain medicine  may be given  Ask your healthcare provider how to take this medicine safely  Some prescription pain medicines contain acetaminophen  Do not take other medicines that contain acetaminophen without talking to your healthcare provider  Too much acetaminophen may cause liver damage  Prescription pain medicine may cause constipation  Ask your healthcare provider how to prevent or treat constipation  · Antibiotics  prevent or fight an infection caused by bacteria  · Take your medicine as directed  Contact your healthcare provider if you think your medicine is not helping or if you have side effects  Tell him or her if you are allergic to any medicine  Keep a list of the medicines, vitamins, and herbs you take  Include the amounts, and when and why you take them  Bring the list or the pill bottles to follow-up visits  Carry your medicine list with you in case of an emergency  Self-care:   · Care for the surgery area as directed  Do not use tampons or have sex for 2 to 3 weeks after your surgery  Do not douche during this time  · Ask about vaccines    Ask your healthcare provider or gynecologist for more information about the vaccine for human papillomavirus (HPV)  The HPV vaccine may help decrease your risk for an HPV infection and abnormal cervical cells  A condom can help prevent the spread of HPV during sex  Ask your healthcare provider for more information on ways to prevent HPV infection  · Talk to your providers about pregnancy  You may have trouble becoming pregnant after a cervical cone biopsy  If you do become pregnant, your baby may need to be delivered early  You may need more care during a future pregnancy to help prevent problems  · Do not smoke  Smoking increases the risk of abnormal cells in your cervix  Ask for information if you need help quitting  Do not use E-cigarettes or smokeless tobacco in place of cigarettes or to help you quit  These still contain nicotine  Follow up with your surgeon or gynecologist as directed: You may need to return to make sure your cervix is healing  Write down your questions so you remember to ask them during your visits  © Copyright [employer] 2021 Information is for End User's use only and may not be sold, redistributed or otherwise used for commercial purposes  All illustrations and images included in CareNotes® are the copyrighted property of [employer]  or Jake Valenzuela   The above information is an  only  It is not intended as medical advice for individual conditions or treatments  Talk to your doctor, nurse or pharmacist before following any medical regimen to see if it is safe and effective for you

## 2021-09-13 NOTE — H&P
H&P Note - Gynecology   Terra Hernandez 46 y o  female MRN: [id number]    Unit/Bed#: OR [place] Encounter: [id number]        ASSESSMENT:  46 y o  female with longstanding history of cervical dysplasia with  recent positive endocervical curettings  PLAN:  Cone biopsy      Patient of Dr Karla Sena      HPI:  55-year-old  0 admitted for cone biopsy for definitive treatment of cervical dysplasia grade 1 with endocervical curettings noted to be positive  Patient has had an abnormal Pap smear with colposcopies over the past 2 years  HPV test was positive  Patient has failed cryo surgery  Most recent Pap smear in 2021 revealed low-grade GIANLUCA  Cervical biopsy at 5:00  was positive for cervical dysplasia grade 1  with positive endocervical curettings            OBJECTIVE    Historical Information     Past Medical History:   Diagnosis Date    Acid reflux     Anxiety     Anxiety     Bacterial vaginosis     Cleft palate     Costochondritis     DDD (degenerative disc disease), cervical     Frequency of urination     GERD (gastroesophageal reflux disease)     HPV (human papilloma virus) anogenital infection     HPV (human papilloma virus) infection     Hyperhidrosis     Hyperlipidemia     Hypertension     Perforated ear drum     Raynaud's disease     Tobacco dependence     Umbilical hernia     Vocal cords swelling     Wears glasses        Past Surgical History:   Procedure Laterality Date    CLEFT PALATE REPAIR      TYMPANOPLASTY Left     TYMPANOSTOMY TUBE PLACEMENT         OB History    Para Term  AB Living   0 0 0 0 0 0   SAB TAB Ectopic Multiple Live Births   0 0 0 0 0       Family History   Problem Relation Age of Onset    Lung cancer Mother     Mental illness Mother     Heart disease Father     Hypertension Father     Uterine cancer Sister     No Known Problems Maternal Grandmother     No Known Problems Maternal Grandfather     No Known Problems Paternal Grandmother     No Known Problems Paternal Grandfather     No Known Problems Maternal Aunt     Cancer Paternal Aunt         unknown origin        Social History     Socioeconomic History    Marital status: Single     Spouse name: Not on file    Number of children: Not on file    Years of education: Not on file    Highest education level: Not on file   Occupational History    Occupation:     Tobacco Use    Smoking status: Current Every Day Smoker     Packs/day: 0 50     Types: Cigarettes    Smokeless tobacco: Never Used   Vaping Use    Vaping Use: Never used   Substance and Sexual Activity    Alcohol use: Not Currently    Drug use: Never    Sexual activity: Not on file   Other Topics Concern    Not on file   Social History Narrative    Smoke: 1 pack daily x 25 yrs - As per paper chart      Social Determinants of Health     Financial Resource Strain:     Difficulty of Paying Living Expenses:    Food Insecurity:     Worried About Running Out of Food in the Last Year:     Ran Out of Food in the Last Year:    Transportation Needs:     Lack of Transportation (Medical):      Lack of Transportation (Non-Medical):    Physical Activity:     Days of Exercise per Week:     Minutes of Exercise per Session:    Stress:     Feeling of Stress :    Social Connections:     Frequency of Communication with Friends and Family:     Frequency of Social Gatherings with Friends and Family:     Attends Jew Services:     Active Member of Clubs or Organizations:     Attends Club or Organization Meetings:     Marital Status:    Intimate Partner Violence:     Fear of Current or Ex-Partner:     Emotionally Abused:     Physically Abused:     Sexually Abused:        Social History     Substance and Sexual Activity   Alcohol Use Not Currently     Social History     Substance and Sexual Activity   Drug Use Never     Social History     Tobacco Use   Smoking Status Current Every Day Smoker    Packs/day: 0 50    Types: Cigarettes   Smokeless Tobacco Never Used       Medications Prior to Admission   Medication    amLODIPine (NORVASC) 10 mg tablet    atorvastatin (LIPITOR) 10 mg tablet    celecoxib (CeleBREX) 100 mg capsule    cyclobenzaprine (FLEXERIL) 10 mg tablet    famotidine (PEPCID) 20 mg tablet    omeprazole (PriLOSEC) 20 mg delayed release capsule    psyllium (METAMUCIL) 58 6 % packet     No current facility-administered medications on file prior to encounter  Current Outpatient Medications on File Prior to Encounter   Medication Sig Dispense Refill    celecoxib (CeleBREX) 100 mg capsule Take 1 capsule (100 mg total) by mouth 2 (two) times a day 180 capsule 1    cyclobenzaprine (FLEXERIL) 10 mg tablet Take 1 tablet (10 mg total) by mouth daily at bedtime 90 tablet 1    famotidine (PEPCID) 20 mg tablet Take 20 mg by mouth daily       psyllium (METAMUCIL) 58 6 % packet Take 1 packet by mouth daily      [DISCONTINUED] amLODIPine (NORVASC) 10 mg tablet Take 1 tablet by mouth once daily 30 tablet 0    [DISCONTINUED] atorvastatin (LIPITOR) 10 mg tablet Take 1 tablet by mouth once daily 30 tablet 0    [DISCONTINUED] omeprazole (PriLOSEC) 20 mg delayed release capsule Take 1 capsule by mouth once daily 30 capsule 0       No Known Allergies    Patient Vitals for the past 24 hrs:   BP Temp Temp src Pulse Resp SpO2 Height Weight   09/13/21 1158 163/86 99 °F (37 2 °C) Temporal (!) 119 16 99 % 5' 3" (1 6 m) 93 9 kg (207 lb)     Oxygen Therapy  SpO2: 99 %  I/O     None        No intake or output data in the 24 hours ending 09/13/21 1357    Physical Exam:   General: No Acute Distress   Cardiovascular: Regular Rate and Rhythm   Lungs: Clear to Auscultation Bilaterally, no wheezing, rhonchi or rales   Abdomen: non-tender, no rebound or guarding;     Lower Extremities: negative Ely's sign bilaterally   Neuro: Alert, cooperative    Gyn:      Normal external genitalia                Vaginal: no lesions     Cervix: unremarkable appearance      Uterus: unremarkable size  anteverted     Adnexa: no palpable masses         Laboratory Studies:                    Invalid input(s): GLU, CA                                Invalid input(s): COLORUA        Invalid input(s): GLU            No results found for: HCG, PROGESTERONE          No results found for: ABO  No results found for: RH  No results found for: ANTIBODYSCR  No results found for: SPECIMEXP    Medications:  Medication Administration - last 24 hours from 09/12/2021 1357 to 09/13/2021 1357       Date/Time Order Dose Route Action Action by     09/13/2021 1220 lactated ringers infusion 50 mL/hr Intravenous New Bag Anitha Garcia RN          Continuous Infusions:  lactated ringers, 50 mL/hr, Last Rate: 50 mL/hr (09/13/21 1220)        Scheduled Meds:  Current Facility-Administered Medications   Medication Dose Route Frequency Provider Last Rate    lactated ringers  50 mL/hr Intravenous Continuous Lynn Cook MD 50 mL/hr (09/13/21 1220)       PRN Meds:      Invasive  Devices: Invasive Devices     Peripheral Intravenous Line            Peripheral IV 09/13/21 Dorsal (posterior); Left Hand <1 day                Additional Vitals:    Temp  Min: 99 °F (37 2 °C)  Max: 99 °F (37 2 °C)    IBW (Ideal Body Weight): 52 4 kg            Other consulting services: I have personally reviewed pertinent reports  Imaging Studies: I have personally reviewed pertinent reports  EKG, Pathology, Microbiology, and Other Studies: I have personally reviewed pertinent reports          Code Status: No Order  Advance Directive and Living Will:      Power of :    POLST:

## 2021-09-16 DIAGNOSIS — E78.5 DYSLIPIDEMIA: ICD-10-CM

## 2021-09-16 DIAGNOSIS — I10 HYPERTENSION, UNSPECIFIED TYPE: ICD-10-CM

## 2021-09-16 DIAGNOSIS — K21.9 GASTROESOPHAGEAL REFLUX DISEASE: ICD-10-CM

## 2021-09-16 RX ORDER — ATORVASTATIN CALCIUM 10 MG/1
TABLET, FILM COATED ORAL
Qty: 30 TABLET | Refills: 0 | Status: SHIPPED | OUTPATIENT
Start: 2021-09-16 | End: 2021-10-18 | Stop reason: SDUPTHER

## 2021-09-16 RX ORDER — OMEPRAZOLE 20 MG/1
CAPSULE, DELAYED RELEASE ORAL
Qty: 30 CAPSULE | Refills: 0 | Status: SHIPPED | OUTPATIENT
Start: 2021-09-16 | End: 2021-10-18 | Stop reason: SDUPTHER

## 2021-09-16 RX ORDER — AMLODIPINE BESYLATE 10 MG/1
TABLET ORAL
Qty: 30 TABLET | Refills: 0 | Status: SHIPPED | OUTPATIENT
Start: 2021-09-16 | End: 2021-09-21 | Stop reason: SDUPTHER

## 2021-09-21 ENCOUNTER — OFFICE VISIT (OUTPATIENT)
Dept: RHEUMATOLOGY | Facility: CLINIC | Age: 51
End: 2021-09-21
Payer: COMMERCIAL

## 2021-09-21 VITALS
WEIGHT: 207 LBS | SYSTOLIC BLOOD PRESSURE: 135 MMHG | HEART RATE: 103 BPM | BODY MASS INDEX: 36.68 KG/M2 | HEIGHT: 63 IN | DIASTOLIC BLOOD PRESSURE: 84 MMHG

## 2021-09-21 DIAGNOSIS — M62.838 MUSCLE SPASM: ICD-10-CM

## 2021-09-21 DIAGNOSIS — I10 HYPERTENSION, UNSPECIFIED TYPE: ICD-10-CM

## 2021-09-21 DIAGNOSIS — M50.30 DEGENERATIVE CERVICAL DISC: ICD-10-CM

## 2021-09-21 DIAGNOSIS — I73.00 RAYNAUD'S DISEASE WITHOUT GANGRENE: ICD-10-CM

## 2021-09-21 DIAGNOSIS — M94.0 TIETZE SYNDROME: Primary | ICD-10-CM

## 2021-09-21 PROCEDURE — 99214 OFFICE O/P EST MOD 30 MIN: CPT | Performed by: INTERNAL MEDICINE

## 2021-09-21 RX ORDER — CELECOXIB 100 MG/1
100 CAPSULE ORAL 2 TIMES DAILY
Qty: 180 CAPSULE | Refills: 3 | Status: SHIPPED | OUTPATIENT
Start: 2021-09-21

## 2021-09-21 RX ORDER — AMLODIPINE BESYLATE 10 MG/1
10 TABLET ORAL DAILY
Qty: 90 TABLET | Refills: 3 | Status: SHIPPED | OUTPATIENT
Start: 2021-09-21

## 2021-09-21 RX ORDER — CYCLOBENZAPRINE HCL 10 MG
10 TABLET ORAL
Qty: 90 TABLET | Refills: 3 | Status: SHIPPED | OUTPATIENT
Start: 2021-09-21 | End: 2021-12-06 | Stop reason: ALTCHOICE

## 2021-10-18 ENCOUNTER — OFFICE VISIT (OUTPATIENT)
Dept: FAMILY MEDICINE CLINIC | Facility: CLINIC | Age: 51
End: 2021-10-18
Payer: COMMERCIAL

## 2021-10-18 VITALS
HEART RATE: 80 BPM | WEIGHT: 204 LBS | BODY MASS INDEX: 36.14 KG/M2 | HEIGHT: 63 IN | SYSTOLIC BLOOD PRESSURE: 124 MMHG | TEMPERATURE: 98.1 F | DIASTOLIC BLOOD PRESSURE: 68 MMHG | RESPIRATION RATE: 20 BRPM

## 2021-10-18 DIAGNOSIS — Z00.00 ANNUAL PHYSICAL EXAM: Primary | ICD-10-CM

## 2021-10-18 DIAGNOSIS — H72.91 PERFORATION OF RIGHT TYMPANIC MEMBRANE: ICD-10-CM

## 2021-10-18 DIAGNOSIS — K21.9 GASTROESOPHAGEAL REFLUX DISEASE: ICD-10-CM

## 2021-10-18 DIAGNOSIS — Z11.3 SCREENING FOR STDS (SEXUALLY TRANSMITTED DISEASES): ICD-10-CM

## 2021-10-18 DIAGNOSIS — I10 HYPERTENSION, UNSPECIFIED TYPE: ICD-10-CM

## 2021-10-18 DIAGNOSIS — Z00.00 ROUTINE GENERAL MEDICAL EXAMINATION AT A HEALTH CARE FACILITY: ICD-10-CM

## 2021-10-18 DIAGNOSIS — F17.200 TOBACCO USE DISORDER: ICD-10-CM

## 2021-10-18 DIAGNOSIS — Z11.59 NEED FOR HEPATITIS C SCREENING TEST: ICD-10-CM

## 2021-10-18 DIAGNOSIS — E78.5 DYSLIPIDEMIA: ICD-10-CM

## 2021-10-18 DIAGNOSIS — E55.9 VITAMIN D DEFICIENCY: ICD-10-CM

## 2021-10-18 DIAGNOSIS — Z11.4 SCREENING FOR HIV (HUMAN IMMUNODEFICIENCY VIRUS): ICD-10-CM

## 2021-10-18 PROCEDURE — 99396 PREV VISIT EST AGE 40-64: CPT | Performed by: FAMILY MEDICINE

## 2021-10-18 PROCEDURE — 99213 OFFICE O/P EST LOW 20 MIN: CPT | Performed by: FAMILY MEDICINE

## 2021-10-18 RX ORDER — OMEPRAZOLE 20 MG/1
20 CAPSULE, DELAYED RELEASE ORAL DAILY
Qty: 90 CAPSULE | Refills: 1 | Status: SHIPPED | OUTPATIENT
Start: 2021-10-18 | End: 2022-04-18 | Stop reason: SDUPTHER

## 2021-10-18 RX ORDER — MULTIVITAMIN
1 TABLET ORAL DAILY
COMMUNITY

## 2021-10-18 RX ORDER — MULTIVIT WITH MINERALS/LUTEIN
500 TABLET ORAL DAILY
COMMUNITY

## 2021-10-18 RX ORDER — ATORVASTATIN CALCIUM 10 MG/1
10 TABLET, FILM COATED ORAL DAILY
Qty: 90 TABLET | Refills: 1 | Status: SHIPPED | OUTPATIENT
Start: 2021-10-18 | End: 2022-04-18 | Stop reason: SDUPTHER

## 2021-12-06 ENCOUNTER — OFFICE VISIT (OUTPATIENT)
Dept: NEUROSURGERY | Facility: CLINIC | Age: 51
End: 2021-12-06
Payer: COMMERCIAL

## 2021-12-06 VITALS
SYSTOLIC BLOOD PRESSURE: 148 MMHG | WEIGHT: 211 LBS | HEART RATE: 111 BPM | HEIGHT: 64 IN | TEMPERATURE: 98.2 F | BODY MASS INDEX: 36.02 KG/M2 | DIASTOLIC BLOOD PRESSURE: 106 MMHG

## 2021-12-06 DIAGNOSIS — R29.898 ARM FATIGUE: ICD-10-CM

## 2021-12-06 DIAGNOSIS — M47.812 SPONDYLOSIS OF CERVICAL REGION WITHOUT MYELOPATHY OR RADICULOPATHY: Primary | ICD-10-CM

## 2021-12-06 PROCEDURE — 99213 OFFICE O/P EST LOW 20 MIN: CPT | Performed by: PHYSICIAN ASSISTANT

## 2021-12-27 ENCOUNTER — APPOINTMENT (OUTPATIENT)
Dept: LAB | Facility: HOSPITAL | Age: 51
End: 2021-12-27
Attending: SURGERY
Payer: COMMERCIAL

## 2021-12-27 ENCOUNTER — OFFICE VISIT (OUTPATIENT)
Dept: SURGERY | Facility: CLINIC | Age: 51
End: 2021-12-27
Payer: COMMERCIAL

## 2021-12-27 ENCOUNTER — VBI (OUTPATIENT)
Dept: ADMINISTRATIVE | Facility: OTHER | Age: 51
End: 2021-12-27

## 2021-12-27 VITALS
TEMPERATURE: 98.8 F | WEIGHT: 217 LBS | HEART RATE: 102 BPM | HEIGHT: 64 IN | DIASTOLIC BLOOD PRESSURE: 79 MMHG | SYSTOLIC BLOOD PRESSURE: 144 MMHG | BODY MASS INDEX: 37.05 KG/M2

## 2021-12-27 DIAGNOSIS — L72.0 INFECTED EPIDERMOID CYST: ICD-10-CM

## 2021-12-27 DIAGNOSIS — L08.9 INFECTED EPIDERMOID CYST: ICD-10-CM

## 2021-12-27 DIAGNOSIS — L72.0 INFECTED EPIDERMOID CYST: Primary | ICD-10-CM

## 2021-12-27 DIAGNOSIS — L08.9 INFECTED EPIDERMOID CYST: Primary | ICD-10-CM

## 2021-12-27 PROCEDURE — 88304 TISSUE EXAM BY PATHOLOGIST: CPT | Performed by: PATHOLOGY

## 2021-12-27 PROCEDURE — 10060 I&D ABSCESS SIMPLE/SINGLE: CPT | Performed by: SURGERY

## 2021-12-27 PROCEDURE — 87186 SC STD MICRODIL/AGAR DIL: CPT

## 2021-12-27 PROCEDURE — 87205 SMEAR GRAM STAIN: CPT

## 2021-12-27 PROCEDURE — 87077 CULTURE AEROBIC IDENTIFY: CPT

## 2021-12-27 PROCEDURE — 99213 OFFICE O/P EST LOW 20 MIN: CPT | Performed by: SURGERY

## 2021-12-27 PROCEDURE — 87070 CULTURE OTHR SPECIMN AEROBIC: CPT

## 2021-12-27 RX ORDER — CEPHALEXIN 500 MG/1
500 CAPSULE ORAL EVERY 12 HOURS SCHEDULED
Qty: 10 CAPSULE | Refills: 0 | Status: SHIPPED | OUTPATIENT
Start: 2021-12-27 | End: 2022-01-01

## 2021-12-30 ENCOUNTER — TELEPHONE (OUTPATIENT)
Dept: SURGERY | Facility: CLINIC | Age: 51
End: 2021-12-30

## 2021-12-30 DIAGNOSIS — L08.9 INFECTED EPIDERMOID CYST: Primary | ICD-10-CM

## 2021-12-30 DIAGNOSIS — L72.0 INFECTED EPIDERMOID CYST: Primary | ICD-10-CM

## 2021-12-30 LAB
BACTERIA WND AEROBE CULT: ABNORMAL
BACTERIA WND AEROBE CULT: ABNORMAL
GRAM STN SPEC: ABNORMAL

## 2021-12-30 RX ORDER — CEFUROXIME AXETIL 500 MG/1
500 TABLET ORAL EVERY 12 HOURS SCHEDULED
Qty: 10 TABLET | Refills: 0 | Status: SHIPPED | OUTPATIENT
Start: 2021-12-30 | End: 2022-01-04

## 2022-01-03 ENCOUNTER — OFFICE VISIT (OUTPATIENT)
Dept: SURGERY | Facility: CLINIC | Age: 52
End: 2022-01-03

## 2022-01-03 VITALS
HEART RATE: 101 BPM | BODY MASS INDEX: 37.05 KG/M2 | WEIGHT: 217 LBS | HEIGHT: 64 IN | DIASTOLIC BLOOD PRESSURE: 83 MMHG | TEMPERATURE: 98.8 F | SYSTOLIC BLOOD PRESSURE: 145 MMHG

## 2022-01-03 DIAGNOSIS — L08.9 INFECTED EPIDERMOID CYST: Primary | ICD-10-CM

## 2022-01-03 DIAGNOSIS — L72.0 INFECTED EPIDERMOID CYST: Primary | ICD-10-CM

## 2022-01-03 PROCEDURE — 99024 POSTOP FOLLOW-UP VISIT: CPT | Performed by: SURGERY

## 2022-01-03 NOTE — ASSESSMENT & PLAN NOTE
Status post incision drainage of infected epidermal inclusion cyst of the right upper chest/breast   On exam today it is completely healed  Mild irritation from the tape  Patient did complete a course of antibiotics    Patient encouraged to follow-up if the cystic lesion reforms so she can have definitive excision prior to for current infection

## 2022-01-03 NOTE — PROGRESS NOTES
Assessment/Plan:    Infected epidermoid cyst  Status post incision drainage of infected epidermal inclusion cyst of the right upper chest/breast   On exam today it is completely healed  Mild irritation from the tape  Patient did complete a course of antibiotics  Patient encouraged to follow-up if the cystic lesion reforms so she can have definitive excision prior to for current infection       Diagnoses and all orders for this visit:    Infected epidermoid cyst          Subjective:      Patient ID: Barbie Cochran is a 46 y o  female  49-year-old female status post incision drainage of a right upper chest/breast epidermal inclusion cyst which was infected presents today for follow-up  Overall doing well  No nausea vomiting no fevers chills  Denies any significant drainage  Patient did take her entire course of antibiotics  The following portions of the patient's history were reviewed and updated as appropriate:   She  has a past medical history of Acid reflux, Anxiety, Anxiety, Bacterial vaginosis, Cleft palate, Costochondritis, DDD (degenerative disc disease), cervical, Frequency of urination, GERD (gastroesophageal reflux disease), HPV (human papilloma virus) anogenital infection, HPV (human papilloma virus) infection, Hyperhidrosis, Hyperlipidemia, Hypertension, Perforated ear drum, Raynaud's disease, Tobacco dependence, Umbilical hernia, Vocal cords swelling, and Wears glasses    She   Patient Active Problem List    Diagnosis Date Noted    Infected epidermoid cyst 12/27/2021    Mild cervical dysplasia 09/13/2021    Sebaceous cyst of right axilla 09/20/2020    Cervical stenosis of spine 05/27/2020    Spondylosis of cervical region without myelopathy or radiculopathy 05/27/2020    Degenerative cervical disc 05/15/2020    Tobacco use 05/15/2020    Screening for colon cancer 05/15/2020    Fibroids 05/08/2020    Ovarian cyst 19/14/1195    Umbilical hernia 15/72/9259    Dysphagia 05/02/2020  Tachycardia 05/02/2020    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    Degeneration of intervertebral disc of cervical region 04/13/2020    Tietze syndrome 03/30/2020    Age-related nuclear cataract, bilateral 02/07/2020    Presbyopia 02/07/2020    Abnormality of right breast on screening mammogram 01/27/2020    High blood pressure 11/13/2019    Microhematuria 04/11/2019    Blood in urine 03/27/2019    H/O colposcopy with cervical biopsy 03/27/2019    Vocal cord contact ulcer 03/12/2019    HPV (human papilloma virus) infection 02/26/2019    Vocal cords swelling 01/13/2015    Acid reflux 07/01/2003    Claustrophobia 01/01/1997    Raynaud's disease 10/01/1984    Perforation of tympanic membrane 04/01/1981    Cleft palate 1970    Deviated septum 1970     She  has a past surgical history that includes Cleft palate repair; Tympanostomy tube placement; Tympanoplasty (Left); and Cervical biopsy (N/A, 9/13/2021)  Her family history includes Cancer in her paternal aunt; Heart disease in her father; Hypertension in her father; Lung cancer in her mother; Mental illness in her mother; No Known Problems in her maternal aunt, maternal grandfather, maternal grandmother, paternal grandfather, and paternal grandmother; Uterine cancer in her sister  She  reports that she has been smoking cigarettes  She has been smoking about 0 50 packs per day  She has never used smokeless tobacco  She reports previous alcohol use  She reports that she does not use drugs    Current Outpatient Medications   Medication Sig Dispense Refill    amLODIPine (NORVASC) 10 mg tablet Take 1 tablet (10 mg total) by mouth daily 90 tablet 3    ascorbic acid (VITAMIN C) 250 mg tablet Take 500 mg by mouth daily      atorvastatin (LIPITOR) 10 mg tablet Take 1 tablet (10 mg total) by mouth daily 90 tablet 1    cefuroxime (CEFTIN) 500 mg tablet Take 1 tablet (500 mg total) by mouth every 12 (twelve) hours for 5 days 10 tablet 0    celecoxib (CeleBREX) 100 mg capsule Take 1 capsule (100 mg total) by mouth 2 (two) times a day 180 capsule 3    Multiple Vitamin (multivitamin) tablet Take 1 tablet by mouth daily      omeprazole (PriLOSEC) 20 mg delayed release capsule Take 1 capsule (20 mg total) by mouth daily 90 capsule 1    psyllium (METAMUCIL) 58 6 % packet Take 1 packet by mouth daily       No current facility-administered medications for this visit  She has No Known Allergies       Review of Systems   Constitutional: Negative  Skin: Positive for rash (Likely secondary to tape) and wound ( right upper chest/breast)  Negative for color change and pallor  Objective:      /83   Pulse 101   Temp 98 8 °F (37 1 °C)   Ht 5' 4" (1 626 m)   Wt 98 4 kg (217 lb)   BMI 37 25 kg/m²          Physical Exam  Vitals reviewed  Constitutional:       General: She is not in acute distress  Appearance: Normal appearance  She is not ill-appearing, toxic-appearing or diaphoretic  Skin:     General: Skin is warm  Coloration: Skin is not jaundiced or pale  Findings: Rash (Small rash likely secondary to irritation from tape use) present  No bruising  Comments: Healed abscess cavity from incision drainage of infected epidermal inclusion cyst   No drainage  Nontender  Neurological:      Mental Status: She is alert

## 2022-02-11 ENCOUNTER — OFFICE VISIT (OUTPATIENT)
Dept: OTOLARYNGOLOGY | Facility: CLINIC | Age: 52
End: 2022-02-11
Payer: COMMERCIAL

## 2022-02-11 VITALS
BODY MASS INDEX: 36.7 KG/M2 | HEIGHT: 64 IN | TEMPERATURE: 96.8 F | SYSTOLIC BLOOD PRESSURE: 124 MMHG | OXYGEN SATURATION: 97 % | WEIGHT: 215 LBS | DIASTOLIC BLOOD PRESSURE: 70 MMHG | HEART RATE: 98 BPM

## 2022-02-11 DIAGNOSIS — H93.13 BILATERAL TINNITUS: ICD-10-CM

## 2022-02-11 DIAGNOSIS — H72.91 PERFORATION OF RIGHT TYMPANIC MEMBRANE: Primary | ICD-10-CM

## 2022-02-11 DIAGNOSIS — R44.2 PHANTOSMIA: ICD-10-CM

## 2022-02-11 DIAGNOSIS — H61.22 IMPACTED CERUMEN OF LEFT EAR: ICD-10-CM

## 2022-02-11 DIAGNOSIS — Z72.0 TOBACCO USE: ICD-10-CM

## 2022-02-11 PROCEDURE — 99214 OFFICE O/P EST MOD 30 MIN: CPT | Performed by: OTOLARYNGOLOGY

## 2022-02-11 PROCEDURE — 69210 REMOVE IMPACTED EAR WAX UNI: CPT | Performed by: OTOLARYNGOLOGY

## 2022-02-14 NOTE — PROGRESS NOTES
Otolaryngology Clinic Visit  Name:  Margaret Goetz  MRN:  [id number]  Date:  2/14/2022 9:22 AM  ________________________________________________________________________       CHIEF COMPLAINT:   Ear follow up     HPI:  Margaret Goetz is a 46 y o  female with PMH as below who is here for further follow up of ear issues  She was going to have a tympanoplasty on the Right with dr Michelle Eddy but decided against it  She reports clogged feeling on the left today  She continues to smoke and is backing off a little bit  Continues to have been smell/phantasmia since her last visit  She is more open to getting an open MRI today  She denies any other changes from last visit  No other ENT questions or concerns today      PMHx:  Past Medical History:   Diagnosis Date    Acid reflux     Anxiety     Anxiety     Bacterial vaginosis     Cleft palate     Costochondritis     DDD (degenerative disc disease), cervical     Frequency of urination     GERD (gastroesophageal reflux disease)     HPV (human papilloma virus) anogenital infection     HPV (human papilloma virus) infection     Hyperhidrosis     Hyperlipidemia     Hypertension     Perforated ear drum     Raynaud's disease     Tobacco dependence     Umbilical hernia     Vocal cords swelling     Wears glasses        PSHx:  Past Surgical History:   Procedure Laterality Date    CERVICAL BIOPSY N/A 9/13/2021    Procedure: CONE BIOPSY OF THE CERVIX;  Surgeon: Cesilia Marie MD;  Location: [facility] MAIN OR;  Service: Gynecology    CLEFT PALATE REPAIR      TYMPANOPLASTY Left     TYMPANOSTOMY TUBE PLACEMENT         FAMHx:  Family History   Problem Relation Age of Onset    Lung cancer Mother     Mental illness Mother     Heart disease Father     Hypertension Father     Uterine cancer Sister     No Known Problems Maternal Grandmother     No Known Problems Maternal Grandfather     No Known Problems Paternal Grandmother     No Known Problems Paternal Grandfather     No Known Problems Maternal Aunt     Cancer Paternal Aunt         unknown origin        SOCHx:  Social History     Socioeconomic History    Marital status: Single     Spouse name: None    Number of children: None    Years of education: None    Highest education level: None   Occupational History    Occupation:     Tobacco Use    Smoking status: Current Every Day Smoker     Packs/day: 0 50     Types: Cigarettes    Smokeless tobacco: Never Used   Vaping Use    Vaping Use: Never used   Substance and Sexual Activity    Alcohol use: Not Currently    Drug use: Never    Sexual activity: None   Other Topics Concern    None   Social History Narrative    Smoke: 1 pack daily x 25 yrs - As per paper chart      Social Determinants of Health     Financial Resource Strain: Not on file   Food Insecurity: Not on file   Transportation Needs: Not on file   Physical Activity: Not on file   Stress: Not on file   Social Connections: Not on file   Intimate Partner Violence: Not on file   Housing Stability: Not on file       Allergies:  No Known Allergies     MEDS:  Reviewed    ROS:  As above otherwise:   See ROS in encounter by MA       PHYSICAL EXAM:  /70 (BP Location: Left arm, Cuff Size: Large)   Pulse 98   Temp (!) 96 8 °F (36 °C) (Temporal)   Ht 5' 4" (1 626 m)   Wt 97 5 kg (215 lb)   SpO2 97%   BMI 36 90 kg/m²   General: NAD, AOx4  Eyes:  EOMI  Ears:  Right: ear canal normal, TM large marginal apperance, no fluid  Left: ear canal cerumen removed as below, otherwise normal  Nasal: Mild turbinate hypertrophy, crusting throughout, vestibulitis  Oral cavity:  Unremarkable  Neck: Unremarkable  Lymph:  Unremarkable  Skin:  No obvious facial lesions  Neuro: Face symmetrical, no obvious cranial nerve palsy   No focal deficits   Lungs:  Normal work of breathing, symmetrical chest expansion  Vascular: Well perfused    Procedures:  CERUMENECTOMY WITH BINOCULAR MICROSCOPY:  Procedure: Cerumenectomy   : Dr Annie Leija  Diagnosis: Cerumen impaction    After obtaining consent, the patient was prepped and positioned  Left external auditory canal directly inspected with binocular microscopy for a magnified and more clear view not available with manual otoscopy  Cerumenectomy performed with suction using Fraiser tip(s) #7  Pt tolerated procedure well without complications  No anesthesia required, no/minimal blood loss  Medical Data Reviewed:  Records reviewed and summarized as in EPIC    Radiology:  None    Labs:  None     Patient Active Problem List   Diagnosis    Microhematuria    Phantosmia    Abnormality of right breast on screening mammogram    Acid reflux    Age-related nuclear cataract, bilateral    Blood in urine    Claustrophobia    Cleft palate    Degeneration of intervertebral disc of cervical region    Dysphagia    Deviated septum    Fibroids    H/O colposcopy with cervical biopsy    High blood pressure    HPV (human papilloma virus) infection    Ovarian cyst    Perforation of tympanic membrane    Presbyopia    Raynaud's disease    Tachycardia    Tietze syndrome    Umbilical hernia    Vocal cords swelling    Vocal cord contact ulcer    Degenerative cervical disc    Tobacco use    Screening for colon cancer    Cervical stenosis of spine    Spondylosis of cervical region without myelopathy or radiculopathy    Sebaceous cyst of right axilla    Mild cervical dysplasia    Infected epidermoid cyst       ASSESSMENT/PLAN:  Candice Acosta is a 46 y o  female with acute and chronic problems as above who presents with:    1  Perforation of right tympanic membrane    2  Impacted cerumen of left ear    3  Bilateral tinnitus    4  Tobacco use    5  Phantosmia      46year old here for follow up of ear and nose issues  She has a stable chronic Right TM perforation for which she is not interested in repair, will follow for now   She had cerumen impaction on the Left which was removed today without issue with improvement in symptoms  She additionally has continued Phantosmia likely related to her chronic tobacco abuse and nasal dryness  Discussed this today and possible further evaluation, we spoke about the diminished returns with an open MRI's resolution and its comparison to her other imaging  Will follow it for now          Chucho Oconnor MD MPH  Otolaryngology--Head and Neck Surgery  [facility]  2/14/2022 9:22 AM

## 2022-04-18 ENCOUNTER — OFFICE VISIT (OUTPATIENT)
Dept: FAMILY MEDICINE CLINIC | Facility: CLINIC | Age: 52
End: 2022-04-18
Payer: COMMERCIAL

## 2022-04-18 VITALS
HEIGHT: 64 IN | TEMPERATURE: 98 F | BODY MASS INDEX: 35.68 KG/M2 | WEIGHT: 209 LBS | HEART RATE: 68 BPM | DIASTOLIC BLOOD PRESSURE: 64 MMHG | RESPIRATION RATE: 16 BRPM | SYSTOLIC BLOOD PRESSURE: 118 MMHG

## 2022-04-18 DIAGNOSIS — R31.29 MICROSCOPIC HEMATURIA: ICD-10-CM

## 2022-04-18 DIAGNOSIS — F17.200 TOBACCO USE DISORDER: ICD-10-CM

## 2022-04-18 DIAGNOSIS — H72.91 PERFORATION OF RIGHT TYMPANIC MEMBRANE: ICD-10-CM

## 2022-04-18 DIAGNOSIS — I10 HYPERTENSION, UNSPECIFIED TYPE: Primary | ICD-10-CM

## 2022-04-18 DIAGNOSIS — E78.5 DYSLIPIDEMIA: ICD-10-CM

## 2022-04-18 DIAGNOSIS — M94.0 TIETZE SYNDROME: ICD-10-CM

## 2022-04-18 DIAGNOSIS — K21.9 GASTROESOPHAGEAL REFLUX DISEASE: ICD-10-CM

## 2022-04-18 PROCEDURE — 99214 OFFICE O/P EST MOD 30 MIN: CPT | Performed by: FAMILY MEDICINE

## 2022-04-18 RX ORDER — OMEPRAZOLE 20 MG/1
20 CAPSULE, DELAYED RELEASE ORAL DAILY
Qty: 90 CAPSULE | Refills: 1 | Status: SHIPPED | OUTPATIENT
Start: 2022-04-18

## 2022-04-18 RX ORDER — ATORVASTATIN CALCIUM 10 MG/1
10 TABLET, FILM COATED ORAL DAILY
Qty: 90 TABLET | Refills: 1 | Status: SHIPPED | OUTPATIENT
Start: 2022-04-18

## 2022-04-18 NOTE — PROGRESS NOTES
Assessment/Plan:  Hypertensive cardiovascular disease with blood pressure controlled on the current regimen    hyperlipidemia on atorvastatin 10 mg laboratory is pending    Costochondritis takes Celebrex on a p r n  Basis    Gastroesophageal reflux disease Prilosec 20 mg minimizes symptoms    Perforation of right tympanic membrane asymptomatic currently the patient deferred surgical intervention    Tobacco use disorder the patient had interested in quitting at this time    Microscopic hematuria urology evaluation in CT scan from April 2019 were reviewed    Problem List Items Addressed This Visit        Cardiovascular and Mediastinum    High blood pressure       Musculoskeletal and Integument    Tietze syndrome      Other Visit Diagnoses     Dyslipidemia        Gastroesophageal reflux disease               Diagnoses and all orders for this visit:    Hypertension, unspecified type    Dyslipidemia    Tietze syndrome    Gastroesophageal reflux disease        No problem-specific Assessment & Plan notes found for this encounter  PHQ-2/9 Depression Screening    Little interest or pleasure in doing things: 0 - not at all  Feeling down, depressed, or hopeless: 0 - not at all  PHQ-2 Score: 0  PHQ-2 Interpretation: Negative depression screen          Body mass index is 35 87 kg/m²  BMI Counseling: Body mass index is 35 87 kg/m²  The BMI     Subjective:      Patient ID: Sorin Jaramillo is a 46 y o  female      Patient presents for six-month checkup on hypertension and hyperlipidemia      The following portions of the patient's history were reviewed and updated as appropriate:   She has a past medical history of Acid reflux, Anxiety, Anxiety, Bacterial vaginosis, Cleft palate, Costochondritis, DDD (degenerative disc disease), cervical, Frequency of urination, GERD (gastroesophageal reflux disease), HPV (human papilloma virus) anogenital infection, HPV (human papilloma virus) infection, Hyperhidrosis, Hyperlipidemia, Hypertension, Perforated ear drum, Raynaud's disease, Tobacco dependence, Umbilical hernia, Vocal cords swelling, and Wears glasses  ,  does not have any pertinent problems on file  ,   has a past surgical history that includes Cleft palate repair; Tympanostomy tube placement; Tympanoplasty (Left); and Cervical biopsy (N/A, 9/13/2021)  ,  family history includes Cancer in her paternal aunt; Heart disease in her father; Hypertension in her father; Lung cancer in her mother; Mental illness in her mother; No Known Problems in her maternal aunt, maternal grandfather, maternal grandmother, paternal grandfather, and paternal grandmother; Uterine cancer in her sister  ,   reports that she has been smoking cigarettes  She has been smoking about 1 00 pack per day  She has never used smokeless tobacco  She reports previous alcohol use  She reports that she does not use drugs  ,  has No Known Allergies     Current Outpatient Medications   Medication Sig Dispense Refill    amLODIPine (NORVASC) 10 mg tablet Take 1 tablet (10 mg total) by mouth daily 90 tablet 3    ascorbic acid (VITAMIN C) 250 mg tablet Take 500 mg by mouth daily      atorvastatin (LIPITOR) 10 mg tablet Take 1 tablet (10 mg total) by mouth daily 90 tablet 1    celecoxib (CeleBREX) 100 mg capsule Take 1 capsule (100 mg total) by mouth 2 (two) times a day 180 capsule 3    Multiple Vitamin (multivitamin) tablet Take 1 tablet by mouth daily      omeprazole (PriLOSEC) 20 mg delayed release capsule Take 1 capsule (20 mg total) by mouth daily 90 capsule 1    psyllium (METAMUCIL) 58 6 % packet Take 1 packet by mouth daily as needed         No current facility-administered medications for this visit  Review of Systems   Constitutional: Negative for chills and fever  HENT: Negative for ear pain and sore throat  Eyes: Negative for pain and visual disturbance  Respiratory: Negative for cough and shortness of breath      Cardiovascular: Negative for chest pain and palpitations  Gastrointestinal: Negative for abdominal pain and vomiting  Genitourinary: Negative for dysuria and hematuria  Musculoskeletal: Negative for arthralgias and back pain  Skin: Negative for color change and rash  Neurological: Negative for seizures and syncope  All other systems reviewed and are negative  Objective:    /64   Pulse 68   Temp 98 °F (36 7 °C)   Resp 16   Ht 5' 4" (1 626 m)   Wt 94 8 kg (209 lb)   BMI 35 87 kg/m²   Body mass index is 35 87 kg/m²  Physical Exam  Nursing note reviewed  Exam conducted with a chaperone present  Constitutional:       Appearance: She is well-developed  HENT:      Head: Normocephalic  Eyes:      Pupils: Pupils are equal, round, and reactive to light  Cardiovascular:      Rate and Rhythm: Normal rate and regular rhythm  Heart sounds: Normal heart sounds  Pulmonary:      Effort: Pulmonary effort is normal       Breath sounds: Normal breath sounds  Abdominal:      General: Bowel sounds are normal       Palpations: Abdomen is soft  Tenderness: There is no abdominal tenderness  Musculoskeletal:      Cervical back: Normal range of motion  Skin:     General: Skin is warm  Neurological:      Mental Status: She is alert and oriented to person, place, and time

## 2022-04-20 ENCOUNTER — APPOINTMENT (OUTPATIENT)
Dept: LAB | Facility: CLINIC | Age: 52
End: 2022-04-20
Payer: COMMERCIAL

## 2022-04-20 DIAGNOSIS — Z00.00 ANNUAL PHYSICAL EXAM: ICD-10-CM

## 2022-04-20 DIAGNOSIS — Z11.3 SCREENING FOR STDS (SEXUALLY TRANSMITTED DISEASES): ICD-10-CM

## 2022-04-20 DIAGNOSIS — Z11.59 NEED FOR HEPATITIS C SCREENING TEST: ICD-10-CM

## 2022-04-20 DIAGNOSIS — E55.9 VITAMIN D DEFICIENCY: ICD-10-CM

## 2022-04-20 DIAGNOSIS — Z11.4 SCREENING FOR HIV (HUMAN IMMUNODEFICIENCY VIRUS): ICD-10-CM

## 2022-04-20 LAB
25(OH)D3 SERPL-MCNC: 44.1 NG/ML (ref 30–100)
ALBUMIN SERPL BCP-MCNC: 3.6 G/DL (ref 3.5–5)
ALP SERPL-CCNC: 107 U/L (ref 46–116)
ALT SERPL W P-5'-P-CCNC: 20 U/L (ref 12–78)
ANION GAP SERPL CALCULATED.3IONS-SCNC: 3 MMOL/L (ref 4–13)
AST SERPL W P-5'-P-CCNC: 22 U/L (ref 5–45)
BACTERIA UR QL AUTO: NORMAL /HPF
BILIRUB SERPL-MCNC: 0.74 MG/DL (ref 0.2–1)
BILIRUB UR QL STRIP: NEGATIVE
BUN SERPL-MCNC: 12 MG/DL (ref 5–25)
CALCIUM SERPL-MCNC: 9.1 MG/DL (ref 8.3–10.1)
CHLORIDE SERPL-SCNC: 111 MMOL/L (ref 100–108)
CHOLEST SERPL-MCNC: 142 MG/DL
CLARITY UR: ABNORMAL
CO2 SERPL-SCNC: 26 MMOL/L (ref 21–32)
COLOR UR: ABNORMAL
CREAT SERPL-MCNC: 0.84 MG/DL (ref 0.6–1.3)
ERYTHROCYTE [DISTWIDTH] IN BLOOD BY AUTOMATED COUNT: 14.6 % (ref 11.6–15.1)
GFR SERPL CREATININE-BSD FRML MDRD: 80 ML/MIN/1.73SQ M
GLUCOSE P FAST SERPL-MCNC: 91 MG/DL (ref 65–99)
GLUCOSE UR STRIP-MCNC: NEGATIVE MG/DL
HBV CORE AB SER QL: NORMAL
HBV SURFACE AB SER-ACNC: <3.1 MIU/ML
HBV SURFACE AG SER QL: NORMAL
HCT VFR BLD AUTO: 40.8 % (ref 34.8–46.1)
HCV AB SER QL: NORMAL
HDLC SERPL-MCNC: 66 MG/DL
HGB BLD-MCNC: 13.6 G/DL (ref 11.5–15.4)
HGB UR QL STRIP.AUTO: ABNORMAL
KETONES UR STRIP-MCNC: NEGATIVE MG/DL
LDLC SERPL CALC-MCNC: 64 MG/DL (ref 0–100)
LEUKOCYTE ESTERASE UR QL STRIP: NEGATIVE
MCH RBC QN AUTO: 30.1 PG (ref 26.8–34.3)
MCHC RBC AUTO-ENTMCNC: 33.3 G/DL (ref 31.4–37.4)
MCV RBC AUTO: 90 FL (ref 82–98)
NITRITE UR QL STRIP: NEGATIVE
NON-SQ EPI CELLS URNS QL MICRO: NORMAL /HPF
NONHDLC SERPL-MCNC: 76 MG/DL
PH UR STRIP.AUTO: 5.5 [PH]
PLATELET # BLD AUTO: 254 THOUSANDS/UL (ref 149–390)
PMV BLD AUTO: 11 FL (ref 8.9–12.7)
POTASSIUM SERPL-SCNC: 3.9 MMOL/L (ref 3.5–5.3)
PROT SERPL-MCNC: 7.1 G/DL (ref 6.4–8.2)
PROT UR STRIP-MCNC: ABNORMAL MG/DL
RBC # BLD AUTO: 4.52 MILLION/UL (ref 3.81–5.12)
RBC #/AREA URNS AUTO: NORMAL /HPF
SODIUM SERPL-SCNC: 140 MMOL/L (ref 136–145)
SP GR UR STRIP.AUTO: 1.02 (ref 1–1.03)
TRIGL SERPL-MCNC: 62 MG/DL
TSH SERPL DL<=0.05 MIU/L-ACNC: 1.75 UIU/ML (ref 0.45–4.5)
UROBILINOGEN UR STRIP-ACNC: <2 MG/DL
WBC # BLD AUTO: 6.76 THOUSAND/UL (ref 4.31–10.16)
WBC #/AREA URNS AUTO: NORMAL /HPF

## 2022-04-20 PROCEDURE — 86704 HEP B CORE ANTIBODY TOTAL: CPT

## 2022-04-20 PROCEDURE — 80061 LIPID PANEL: CPT

## 2022-04-20 PROCEDURE — 87340 HEPATITIS B SURFACE AG IA: CPT

## 2022-04-20 PROCEDURE — 36415 COLL VENOUS BLD VENIPUNCTURE: CPT

## 2022-04-20 PROCEDURE — 80053 COMPREHEN METABOLIC PANEL: CPT

## 2022-04-20 PROCEDURE — 86803 HEPATITIS C AB TEST: CPT

## 2022-04-20 PROCEDURE — 85027 COMPLETE CBC AUTOMATED: CPT

## 2022-04-20 PROCEDURE — 82306 VITAMIN D 25 HYDROXY: CPT

## 2022-04-20 PROCEDURE — 87389 HIV-1 AG W/HIV-1&-2 AB AG IA: CPT

## 2022-04-20 PROCEDURE — 86706 HEP B SURFACE ANTIBODY: CPT

## 2022-04-20 PROCEDURE — 81001 URINALYSIS AUTO W/SCOPE: CPT | Performed by: FAMILY MEDICINE

## 2022-04-20 PROCEDURE — 84443 ASSAY THYROID STIM HORMONE: CPT

## 2022-04-20 PROCEDURE — 86592 SYPHILIS TEST NON-TREP QUAL: CPT

## 2022-04-21 ENCOUNTER — TELEPHONE (OUTPATIENT)
Dept: FAMILY MEDICINE CLINIC | Facility: CLINIC | Age: 52
End: 2022-04-21

## 2022-04-21 LAB
HIV 1+2 AB+HIV1 P24 AG SERPL QL IA: NORMAL
RPR SER QL: NORMAL

## 2022-05-09 ENCOUNTER — TELEPHONE (OUTPATIENT)
Dept: UROLOGY | Facility: AMBULATORY SURGERY CENTER | Age: 52
End: 2022-05-09

## 2022-05-09 NOTE — TELEPHONE ENCOUNTER
Prior pt Dr Orozco being referred back for micro hematuria I contacted pt regarding referral/scheduling she's asking questions I could not answer regarding urine results prior to scheduling,please contact her directly

## 2022-06-17 ENCOUNTER — HOSPITAL ENCOUNTER (EMERGENCY)
Facility: HOSPITAL | Age: 52
Discharge: HOME | End: 2022-06-17
Attending: EMERGENCY MEDICINE | Admitting: EMERGENCY MEDICINE
Payer: COMMERCIAL

## 2022-06-17 ENCOUNTER — APPOINTMENT (EMERGENCY)
Dept: RADIOLOGY | Facility: HOSPITAL | Age: 52
End: 2022-06-17
Payer: COMMERCIAL

## 2022-06-17 VITALS
HEIGHT: 63 IN | SYSTOLIC BLOOD PRESSURE: 177 MMHG | BODY MASS INDEX: 36.29 KG/M2 | OXYGEN SATURATION: 97 % | TEMPERATURE: 98.6 F | HEART RATE: 118 BPM | RESPIRATION RATE: 18 BRPM | DIASTOLIC BLOOD PRESSURE: 103 MMHG | WEIGHT: 204.81 LBS

## 2022-06-17 DIAGNOSIS — J40 BRONCHITIS: Primary | ICD-10-CM

## 2022-06-17 DIAGNOSIS — R07.89 CHEST WALL PAIN: ICD-10-CM

## 2022-06-17 LAB
ALBUMIN SERPL BCP-MCNC: 4.2 G/DL (ref 3.5–5)
ALP SERPL-CCNC: 130 U/L (ref 46–116)
ALT SERPL W P-5'-P-CCNC: 28 U/L (ref 12–78)
ANION GAP SERPL CALCULATED.3IONS-SCNC: 10 MMOL/L (ref 4–13)
AST SERPL W P-5'-P-CCNC: 22 U/L (ref 5–45)
BASOPHILS # BLD AUTO: 0.08 THOUSANDS/ΜL (ref 0–0.1)
BASOPHILS NFR BLD AUTO: 1 % (ref 0–1)
BILIRUB SERPL-MCNC: 0.3 MG/DL (ref 0.2–1)
BUN SERPL-MCNC: 14 MG/DL (ref 5–25)
CALCIUM SERPL-MCNC: 9.3 MG/DL (ref 8.3–10.1)
CARDIAC TROPONIN I PNL SERPL HS: 2 NG/L
CHLORIDE SERPL-SCNC: 102 MMOL/L (ref 100–108)
CO2 SERPL-SCNC: 26 MMOL/L (ref 21–32)
CREAT SERPL-MCNC: 1.13 MG/DL (ref 0.6–1.3)
D DIMER PPP FEU-MCNC: 0.29 UG/ML FEU
EOSINOPHIL # BLD AUTO: 0.14 THOUSAND/ΜL (ref 0–0.61)
EOSINOPHIL NFR BLD AUTO: 2 % (ref 0–6)
ERYTHROCYTE [DISTWIDTH] IN BLOOD BY AUTOMATED COUNT: 13.5 % (ref 11.6–15.1)
GFR SERPL CREATININE-BSD FRML MDRD: 56 ML/MIN/1.73SQ M
GLUCOSE SERPL-MCNC: 95 MG/DL (ref 65–140)
HCT VFR BLD AUTO: 41.6 % (ref 34.8–46.1)
HGB BLD-MCNC: 14.3 G/DL (ref 11.5–15.4)
IMM GRANULOCYTES # BLD AUTO: 0.02 THOUSAND/UL (ref 0–0.2)
IMM GRANULOCYTES NFR BLD AUTO: 0 % (ref 0–2)
LYMPHOCYTES # BLD AUTO: 2.25 THOUSANDS/ΜL (ref 0.6–4.47)
LYMPHOCYTES NFR BLD AUTO: 34 % (ref 14–44)
MCH RBC QN AUTO: 30.3 PG (ref 26.8–34.3)
MCHC RBC AUTO-ENTMCNC: 34.4 G/DL (ref 31.4–37.4)
MCV RBC AUTO: 88 FL (ref 82–98)
MONOCYTES # BLD AUTO: 0.51 THOUSAND/ΜL (ref 0.17–1.22)
MONOCYTES NFR BLD AUTO: 8 % (ref 4–12)
NEUTROPHILS # BLD AUTO: 3.72 THOUSANDS/ΜL (ref 1.85–7.62)
NEUTS SEG NFR BLD AUTO: 55 % (ref 43–75)
NRBC BLD AUTO-RTO: 0 /100 WBCS
PLATELET # BLD AUTO: 255 THOUSANDS/UL (ref 149–390)
PMV BLD AUTO: 10.8 FL (ref 8.9–12.7)
POTASSIUM SERPL-SCNC: 3.6 MMOL/L (ref 3.5–5.3)
PROT SERPL-MCNC: 8 G/DL (ref 6.4–8.2)
RBC # BLD AUTO: 4.72 MILLION/UL (ref 3.81–5.12)
SODIUM SERPL-SCNC: 138 MMOL/L (ref 136–145)
WBC # BLD AUTO: 6.72 THOUSAND/UL (ref 4.31–10.16)

## 2022-06-17 PROCEDURE — 85379 FIBRIN DEGRADATION QUANT: CPT | Performed by: PHYSICIAN ASSISTANT

## 2022-06-17 PROCEDURE — 99285 EMERGENCY DEPT VISIT HI MDM: CPT | Performed by: PHYSICIAN ASSISTANT

## 2022-06-17 PROCEDURE — 96360 HYDRATION IV INFUSION INIT: CPT

## 2022-06-17 PROCEDURE — 99285 EMERGENCY DEPT VISIT HI MDM: CPT

## 2022-06-17 PROCEDURE — 85025 COMPLETE CBC W/AUTO DIFF WBC: CPT | Performed by: PHYSICIAN ASSISTANT

## 2022-06-17 PROCEDURE — 36415 COLL VENOUS BLD VENIPUNCTURE: CPT | Performed by: PHYSICIAN ASSISTANT

## 2022-06-17 PROCEDURE — 80053 COMPREHEN METABOLIC PANEL: CPT | Performed by: PHYSICIAN ASSISTANT

## 2022-06-17 PROCEDURE — 71046 X-RAY EXAM CHEST 2 VIEWS: CPT

## 2022-06-17 PROCEDURE — 84484 ASSAY OF TROPONIN QUANT: CPT | Performed by: PHYSICIAN ASSISTANT

## 2022-06-17 PROCEDURE — 93005 ELECTROCARDIOGRAM TRACING: CPT

## 2022-06-17 RX ORDER — SODIUM CHLORIDE 9 MG/ML
125 INJECTION, SOLUTION INTRAVENOUS CONTINUOUS
Status: DISCONTINUED | OUTPATIENT
Start: 2022-06-17 | End: 2022-06-17 | Stop reason: HOSPADM

## 2022-06-17 RX ADMIN — SODIUM CHLORIDE 125 ML/HR: 0.9 INJECTION, SOLUTION INTRAVENOUS at 20:44

## 2022-06-17 NOTE — ED NOTES
Patient transported to x-ray at this time      Jamaal Pereyra RN  06/17/22 [address], RN  06/17/22 9781

## 2022-06-17 NOTE — ED PROVIDER NOTES
History  Chief Complaint   Patient presents with    Shortness of Breath     Patient reporting SOB beginning at 2 AM; patient reports recently having Jose     This is a 26-year-old female patient who states that 0200 hours this morning she had the sensation of shortness of breath  States throughout the day she has had difficulty speaking along symptoms become short of breath however she does not have dyspnea upon exertion  She is currently talking in full sentences  She did check her pulse oximeter at home and never went below 95%  She denies any chest pain or palpitations  He recently thank you got over COVID-19 approximately 1 week ago  She had a minimal cough she was not extremely ill because she is vaccinated  Denies any fever chills headache blurred vision double vision minimal cough she is a smoker  Does have chronic osteochondritis states that this is baseline is been no increased discomfort  No pleuritic pain  No nausea vomiting diarrhea abdominal pain  Nothing makes it better or worse she tried nothing over-the-counter  Differential diagnosis includes not limited to ACS, pulmonary embolus, COPD from chronic smoking, pneumonia, malignancy, pneumothorax less likely          Prior to Admission Medications   Prescriptions Last Dose Informant Patient Reported? Taking?    Multiple Vitamin (multivitamin) tablet  Self Yes No   Sig: Take 1 tablet by mouth daily   amLODIPine (NORVASC) 10 mg tablet  Self No No   Sig: Take 1 tablet (10 mg total) by mouth daily   ascorbic acid (VITAMIN C) 250 mg tablet  Self Yes No   Sig: Take 500 mg by mouth daily   atorvastatin (LIPITOR) 10 mg tablet   No No   Sig: Take 1 tablet (10 mg total) by mouth daily   celecoxib (CeleBREX) 100 mg capsule  Self No No   Sig: Take 1 capsule (100 mg total) by mouth 2 (two) times a day   omeprazole (PriLOSEC) 20 mg delayed release capsule   No No   Sig: Take 1 capsule (20 mg total) by mouth daily   psyllium (METAMUCIL) 58 6 % packet Self Yes No   Sig: Take 1 packet by mouth daily as needed        Facility-Administered Medications: None       Past Medical History:   Diagnosis Date    Acid reflux     Anxiety     Anxiety     Bacterial vaginosis     Cleft palate     Costochondritis     DDD (degenerative disc disease), cervical     Frequency of urination     GERD (gastroesophageal reflux disease)     HPV (human papilloma virus) anogenital infection     HPV (human papilloma virus) infection     Hyperhidrosis     Hyperlipidemia     Hypertension     Perforated ear drum     Raynaud's disease     Tobacco dependence     Umbilical hernia     Vocal cords swelling     Wears glasses        Past Surgical History:   Procedure Laterality Date    CERVICAL BIOPSY N/A 9/13/2021    Procedure: CONE BIOPSY OF THE CERVIX;  Surgeon: Orion Alexis MD;  Location: [address] [place] [address] OR;  Service: Gynecology    CLEFT PALATE REPAIR      TYMPANOPLASTY Left     TYMPANOSTOMY TUBE PLACEMENT         Family History   Problem Relation Age of Onset    Lung cancer Mother     Mental illness Mother     Heart disease Father     Hypertension Father     Uterine cancer Sister     No Known Problems Maternal Grandmother     No Known Problems Maternal Grandfather     No Known Problems Paternal Grandmother     No Known Problems Paternal Grandfather     No Known Problems Maternal Aunt     Cancer Paternal Aunt         unknown origin      I have reviewed and agree with the history as documented      E-Cigarette/Vaping    E-Cigarette Use Never User      E-Cigarette/Vaping Substances     Social History     Tobacco Use    Smoking status: Current Every Day Smoker     Packs/day: 1 00     Types: Cigarettes    Smokeless tobacco: Never Used    Tobacco comment: 04/18/2022-- max 1 pack per day   Vaping Use    Vaping Use: Never used   Substance Use Topics    Alcohol use: Not Currently    Drug use: Never       Review of Systems   Constitutional: Negative for diaphoresis, fatigue and fever  HENT: Negative for congestion, ear pain, hearing loss, nosebleeds and sore throat  Eyes: Negative for photophobia, pain, discharge and visual disturbance  Respiratory: Positive for cough and shortness of breath  Negative for choking, chest tightness, wheezing and stridor  Cardiovascular: Negative for chest pain, palpitations and leg swelling  Gastrointestinal: Negative for abdominal distention, abdominal pain, diarrhea, nausea and vomiting  Endocrine: Negative for polydipsia and polyphagia  Genitourinary: Negative for dysuria, flank pain and frequency  Musculoskeletal: Negative for arthralgias, back pain, gait problem and joint swelling  Skin: Negative for color change, pallor and rash  Allergic/Immunologic: Negative for environmental allergies and food allergies  Neurological: Negative for dizziness, syncope and headaches  Psychiatric/Behavioral: Negative for agitation, behavioral problems and confusion  The patient is not nervous/anxious  All other systems reviewed and are negative  Physical Exam  Physical Exam  Vitals and nursing note reviewed  Constitutional:       General: She is not in acute distress  Appearance: Normal appearance  She is not ill-appearing, toxic-appearing or diaphoretic  HENT:      Head: Normocephalic and atraumatic  Right Ear: Tympanic membrane, ear canal and external ear normal       Left Ear: Tympanic membrane, ear canal and external ear normal       Nose: Nose normal  No congestion or rhinorrhea  Mouth/Throat:      Mouth: Mucous membranes are moist       Pharynx: Oropharynx is clear  No oropharyngeal exudate or posterior oropharyngeal erythema  Eyes:      Extraocular Movements: Extraocular movements intact  Conjunctiva/sclera: Conjunctivae normal       Pupils: Pupils are equal, round, and reactive to light  Cardiovascular:      Rate and Rhythm: Regular rhythm  Tachycardia present     Pulmonary:      Effort: Pulmonary effort is normal  No respiratory distress  Breath sounds: Normal breath sounds  Chest:      Chest wall: Tenderness present  Abdominal:      General: Bowel sounds are normal       Palpations: Abdomen is soft  Tenderness: There is no abdominal tenderness  Musculoskeletal:         General: Normal range of motion  Cervical back: Normal range of motion and neck supple  No rigidity or tenderness  Right lower leg: No edema  Left lower leg: No edema  Lymphadenopathy:      Cervical: No cervical adenopathy  Skin:     General: Skin is warm and dry  Capillary Refill: Capillary refill takes less than 2 seconds  Findings: No rash  Neurological:      General: No focal deficit present  Mental Status: She is alert and oriented to person, place, and time  Mental status is at baseline     Psychiatric:         Mood and Affect: Mood normal          Behavior: Behavior normal          Vital Signs  ED Triage Vitals [06/17/22 1951]   Temperature Pulse Respirations Blood Pressure SpO2   98 6 °F (37 °C) (!) 118 18 (!) 177/103 98 %      Temp Source Heart Rate Source Patient Position - Orthostatic VS BP Location FiO2 (%)   Temporal Monitor -- -- --      Pain Score       No Pain           Vitals:    06/17/22 1951   BP: (!) 177/103   Pulse: (!) 118         Visual Acuity      ED Medications  Medications - No data to display    Diagnostic Studies  Results Reviewed     Procedure Component Value Units Date/Time    HS Troponin 0hr (reflex protocol) [[id number]]  (Normal) Collected: 06/17/22 2031    Lab Status: Final result Specimen: Blood from Arm, Right Updated: 06/17/22 2101     hs TnI 0hr 2 ng/L     Comprehensive metabolic panel [[id number]]  (Abnormal) Collected: 06/17/22 2031    Lab Status: Final result Specimen: Blood from Arm, Right Updated: 06/17/22 2053     Sodium 138 mmol/L      Potassium 3 6 mmol/L      Chloride 102 mmol/L      CO2 26 mmol/L      ANION GAP 10 mmol/L      BUN 14 mg/dL      Creatinine 1 13 mg/dL      Glucose 95 mg/dL      Calcium 9 3 mg/dL      AST 22 U/L      ALT 28 U/L      Alkaline Phosphatase 130 U/L      Total Protein 8 0 g/dL      Albumin 4 2 g/dL      Total Bilirubin 0 30 mg/dL      eGFR 56 ml/min/1 73sq m     Narrative:      [place] guidelines for Chronic Kidney Disease (CKD):     Stage 1 with normal or high GFR (GFR > 90 mL/min/1 73 square meters)    Stage 2 Mild CKD (GFR = 60-89 mL/min/1 73 square meters)    Stage 3A Moderate CKD (GFR = 45-59 mL/min/1 73 square meters)    Stage 3B Moderate CKD (GFR = 30-44 mL/min/1 73 square meters)    Stage 4 Severe CKD (GFR = 15-29 mL/min/1 73 square meters)    Stage 5 End Stage CKD (GFR <15 mL/min/1 73 square meters)  Note: GFR calculation is accurate only with a steady state creatinine    D-Dimer [[id number]]  (Normal) Collected: 06/17/22 2031    Lab Status: Final result Specimen: Blood from Arm, Right Updated: 06/17/22 2050     D-Dimer, Quant 0 29 ug/ml FEU     Narrative: In the evaluation for possible pulmonary embolism, in the appropriate (Well's Score of 4 or less) patient, the age adjusted d-dimer cutoff for this patient can be calculated as:    Age x 0 01 (in ug/mL) for Age-adjusted D-dimer exclusion threshold for a patient over 50 years      CBC and differential [[id number]] Collected: 06/17/22 2031    Lab Status: Final result Specimen: Blood from Arm, Right Updated: 06/17/22 2039     WBC 6 72 Thousand/uL      RBC 4 72 Million/uL      Hemoglobin 14 3 g/dL      Hematocrit 41 6 %      MCV 88 fL      MCH 30 3 pg      MCHC 34 4 g/dL      RDW 13 5 %      MPV 10 8 fL      Platelets 496 Thousands/uL      nRBC 0 /100 WBCs      Neutrophils Relative 55 %      Immat GRANS % 0 %      Lymphocytes Relative 34 %      Monocytes Relative 8 %      Eosinophils Relative 2 %      Basophils Relative 1 %      Neutrophils Absolute 3 72 Thousands/µL      Immature Grans Absolute 0 02 Thousand/uL      Lymphocytes Absolute 2 25 Thousands/µL      Monocytes Absolute 0 51 Thousand/µL      Eosinophils Absolute 0 14 Thousand/µL      Basophils Absolute 0 08 Thousands/µL                  XR chest 2 views   Final Result by Burma Claude, MD (06/17 2228)      No acute cardiopulmonary disease  Workstation performed: [id number]                    Procedures  Procedures         ED Course  ED Course as of 06/20/22 0911   Fri Jun 17, 2022 2008 Signed out to Dr Edgar Davey 20yo+    Flowsheet Row Most Recent Value   SBIRT (25 yo +)    In order to provide better care to our patients, we are screening all of our patients for alcohol and drug use  Would it be okay to ask you these screening questions? Yes Filed at: 06/17/2022 2047   Initial Alcohol Screen: US AUDIT-C     1  How often do you have a drink containing alcohol? 0 Filed at: 06/17/2022 2047   2  How many drinks containing alcohol do you have on a typical day you are drinking? 0 Filed at: 06/17/2022 2047   3b  FEMALE Any Age, or MALE 65+: How often do you have 4 or more drinks on one occassion? 0 Filed at: 06/17/2022 2047   Audit-C Score 0 Filed at: 06/17/2022 2047   KATHRYN: How many times in the past year have you    Used an illegal drug or used a prescription medication for non-medical reasons? Never Filed at: 06/17/2022 2047                    MDM    Disposition  Final diagnoses:   Bronchitis   Chest wall pain     Time reflects when diagnosis was documented in both MDM as applicable and the Disposition within this note     Time User Action Codes Description Comment    6/17/2022  9:04 PM Darylene Ohm Add [J40] Bronchitis     6/17/2022  9:04 PM Darylene Ohm Add [R07 89] Chest wall pain       ED Disposition     ED Disposition   Discharge    Condition   Stable    Date/Time   Fri Jun 17, 2022  9:04 PM    Comment   Roxanne Cowden discharge to home/self care                 Follow-up Information     Follow up With Specialties Details Why Contact Info    Micki Okeefe DO Family Medicine Schedule an appointment as soon as possible for a visit   [address] [facility]  [address]  [address] [place] [address]  [phone]            Discharge Medication List as of 6/17/2022  9:04 PM      CONTINUE these medications which have NOT CHANGED    Details   amLODIPine (NORVASC) 10 mg tablet Take 1 tablet (10 mg total) by mouth daily, Starting Tue 9/21/2021, Normal      ascorbic acid (VITAMIN C) 250 mg tablet Take 500 mg by mouth daily, Historical Med      atorvastatin (LIPITOR) 10 mg tablet Take 1 tablet (10 mg total) by mouth daily, Starting Mon 4/18/2022, Normal      celecoxib (CeleBREX) 100 mg capsule Take 1 capsule (100 mg total) by mouth 2 (two) times a day, Starting Tue 9/21/2021, Normal      Multiple Vitamin (multivitamin) tablet Take 1 tablet by mouth daily, Historical Med      omeprazole (PriLOSEC) 20 mg delayed release capsule Take 1 capsule (20 mg total) by mouth daily, Starting Mon 4/18/2022, Normal      psyllium (METAMUCIL) 58 6 % packet Take 1 packet by mouth daily as needed  , Historical Med             No discharge procedures on file      PDMP Review     None          ED Provider  Electronically Signed by           Chrystal Phillips PA-C  06/20/22 [address] [place], [place] [address]IRENE  [facility]22 [facility]  06/20/22 8892

## 2022-06-18 NOTE — ED ATTENDING ATTESTATION
6/17/2022  Bautista TRAN DO, saw and evaluated the patient  I have discussed the patient with the resident/non-physician practitioner and agree with the resident's/non-physician practitioner's findings, Plan of Care, and MDM as documented in the resident's/non-physician practitioner's note, except where noted  All available labs and Radiology studies were reviewed  I was present for key portions of any procedure(s) performed by the resident/non-physician practitioner and I was immediately available to provide assistance  At this point I agree with the current assessment done in the Emergency Department  I have conducted an independent evaluation of this patient a history and physical is as follows:    ED Course     Saw patient and evaluated patient in conjunction with physician assistant  Critical Care Time  ECG 12 Lead Documentation Only    Date/Time: 6/17/2022 8:37 PM  Performed by: Main Alejo DO  Authorized by: Main Alejo DO     Indications / Diagnosis:  Sob  ECG reviewed by me, the ED Provider: yes    Patient location:  ED  Rate:     ECG rate:  104    ECG rate assessment: tachycardic    Rhythm:     Rhythm: sinus rhythm    Ectopy:     Ectopy: none    QRS:     QRS axis:  Normal    QRS intervals:  Normal  Conduction:     Conduction: normal    ST segments:     ST segments:  Normal  T waves:     T waves: normal        Smoking cessation discussion with patient:  3-5 minute conversation with the patient regarding smoking cessation  I explained at length risks of long term smoking, associated health conditions, benefits of quitting, as well as quitting methods  Patient receptive and understands      Chest x-ray unremarkable, negative D-dimer  Labs reviewed  Patient remains asymptomatic at this time  Discussed smoking cessation

## 2022-06-21 LAB
ATRIAL RATE: 104 BPM
P AXIS: 53 DEGREES
PR INTERVAL: 158 MS
QRS AXIS: -7 DEGREES
QRSD INTERVAL: 74 MS
QT INTERVAL: 336 MS
QTC INTERVAL: 441 MS
T WAVE AXIS: 40 DEGREES
VENTRICULAR RATE: 104 BPM

## 2022-06-21 PROCEDURE — 93010 ELECTROCARDIOGRAM REPORT: CPT | Performed by: INTERNAL MEDICINE

## 2022-06-27 ENCOUNTER — OFFICE VISIT (OUTPATIENT)
Dept: UROLOGY | Facility: CLINIC | Age: 52
End: 2022-06-27
Payer: COMMERCIAL

## 2022-06-27 VITALS
SYSTOLIC BLOOD PRESSURE: 158 MMHG | WEIGHT: 202 LBS | HEART RATE: 76 BPM | BODY MASS INDEX: 35.78 KG/M2 | DIASTOLIC BLOOD PRESSURE: 92 MMHG

## 2022-06-27 DIAGNOSIS — R31.29 MICROSCOPIC HEMATURIA: Primary | ICD-10-CM

## 2022-06-27 LAB
POST-VOID RESIDUAL VOLUME, ML POC: 16 ML
SL AMB  POCT GLUCOSE, UA: NORMAL
SL AMB LEUKOCYTE ESTERASE,UA: NORMAL
SL AMB POCT BILIRUBIN,UA: NORMAL
SL AMB POCT BLOOD,UA: NORMAL
SL AMB POCT CLARITY,UA: CLEAR
SL AMB POCT COLOR,UA: YELLOW
SL AMB POCT KETONES,UA: NORMAL
SL AMB POCT NITRITE,UA: NORMAL
SL AMB POCT PH,UA: 5
SL AMB POCT SPECIFIC GRAVITY,UA: 1.03
SL AMB POCT URINE PROTEIN: NORMAL
SL AMB POCT UROBILINOGEN: 0.2

## 2022-06-27 PROCEDURE — 51798 US URINE CAPACITY MEASURE: CPT

## 2022-06-27 PROCEDURE — 88112 CYTOPATH CELL ENHANCE TECH: CPT | Performed by: PATHOLOGY

## 2022-06-27 PROCEDURE — 99204 OFFICE O/P NEW MOD 45 MIN: CPT

## 2022-06-27 PROCEDURE — 81002 URINALYSIS NONAUTO W/O SCOPE: CPT

## 2022-06-27 NOTE — PROGRESS NOTES
6/27/2022    Chief Complaint   Patient presents with    Microhematuria       Assessment and Plan    46 y o  female     1  Microscopic hematuria  · Negative microscopic hematuria workup in 2019 by Dr Christine Vargas with cystoscopy and CT renal protocol  · Current smoker  · Urine dip in office negative for leukocytes, nitrates, or blood  · Send for Urine cytology  · Renal US  · Pending Renal US and urine cytology, she may need a repeat cystoscopy      History of Present Illness  David Alonzo is a 46 y o  female here for evaluation of microscopic hematuria  She was evaluated for this in the past by Dr Christine Vargas in 2019  She had a negative CT scan and cystoscopy at that time  Patient reports that she had recent urine testing done by her PCP which showed trace blood  Review of labs shows a urine microscopic from 04/20/2022 which showed 1-2 RBCs per HPF  Urine dip today in office was negative for leukocytes, nitrates, or blood  She denies any episodes gross hematuria  She denies history of kidney stones  She does report occasional urinary frequency and urgency that has been ongoing for several years  She attributes this to her excess caffeine intake  She is a current smoker  She denies any personal or family history of bladder, kidney, or ureteral cancer  Review of Systems   Constitutional: Negative for chills and fever  HENT: Negative for ear pain and sore throat  Eyes: Negative for pain and visual disturbance  Respiratory: Negative for cough and shortness of breath  Cardiovascular: Negative for chest pain and palpitations  Gastrointestinal: Negative for abdominal pain, constipation, diarrhea, nausea and vomiting  Genitourinary: Positive for frequency and urgency  Negative for difficulty urinating, dysuria, flank pain and hematuria  Musculoskeletal: Negative for arthralgias and back pain  Skin: Negative for color change and rash     Neurological: Negative for dizziness, seizures, syncope, weakness and numbness  All other systems reviewed and are negative  Vitals  Vitals:    06/27/22 0920   BP: 158/92   Pulse: 76   Weight: 91 6 kg (202 lb)       Physical Exam  Vitals reviewed  Constitutional:       General: She is not in acute distress  Appearance: Normal appearance  She is normal weight  She is not ill-appearing or toxic-appearing  HENT:      Head: Normocephalic and atraumatic  Nose: Nose normal    Eyes:      General: No scleral icterus  Conjunctiva/sclera: Conjunctivae normal    Cardiovascular:      Rate and Rhythm: Normal rate  Pulses: Normal pulses  Pulmonary:      Effort: Pulmonary effort is normal  No respiratory distress  Abdominal:      General: Abdomen is flat  Palpations: Abdomen is soft  Tenderness: There is no abdominal tenderness  There is no right CVA tenderness or left CVA tenderness  Hernia: No hernia is present  Musculoskeletal:         General: Normal range of motion  Cervical back: Normal range of motion  Skin:     General: Skin is warm and dry  Neurological:      General: No focal deficit present  Mental Status: She is alert and oriented to person, place, and time  Mental status is at baseline  Psychiatric:         Mood and Affect: Mood normal          Behavior: Behavior normal          Thought Content:  Thought content normal          Judgment: Judgment normal          Past History  Past Medical History:   Diagnosis Date    Acid reflux     Anxiety     Anxiety     Bacterial vaginosis     Cleft palate     Costochondritis     DDD (degenerative disc disease), cervical     Frequency of urination     GERD (gastroesophageal reflux disease)     HPV (human papilloma virus) anogenital infection     HPV (human papilloma virus) infection     Hyperhidrosis     Hyperlipidemia     Hypertension     Perforated ear drum     Raynaud's disease     Tobacco dependence     Umbilical hernia     Vocal cords swelling     Wears glasses      Social History     Socioeconomic History    Marital status: Single     Spouse name: None    Number of children: None    Years of education: None    Highest education level: None   Occupational History    Occupation:     Tobacco Use    Smoking status: Current Every Day Smoker     Packs/day: 1 00     Types: Cigarettes    Smokeless tobacco: Never Used    Tobacco comment: 04/18/2022-- max 1 pack per day   Vaping Use    Vaping Use: Never used   Substance and Sexual Activity    Alcohol use: Not Currently    Drug use: Never    Sexual activity: Yes   Other Topics Concern    None   Social History Narrative    Smoke: 1 pack daily x 25 yrs - As per paper chart      Social Determinants of Health     Financial Resource Strain: Not on file   Food Insecurity: Not on file   Transportation Needs: Not on file   Physical Activity: Not on file   Stress: Not on file   Social Connections: Not on file   Intimate Partner Violence: Not on file   Housing Stability: Not on file     Social History     Tobacco Use   Smoking Status Current Every Day Smoker    Packs/day: 1 00    Types: Cigarettes   Smokeless Tobacco Never Used   Tobacco Comment    04/18/2022-- max 1 pack per day     Family History   Problem Relation Age of Onset    Lung cancer Mother     Mental illness Mother     Heart disease Father     Hypertension Father     Uterine cancer Sister     No Known Problems Maternal Grandmother     No Known Problems Maternal Grandfather     No Known Problems Paternal Grandmother     No Known Problems Paternal Grandfather     No Known Problems Maternal Aunt     Cancer Paternal Aunt         unknown origin        The following portions of the patient's history were reviewed and updated as appropriate allergies, current medications, past medical history, past social history, past surgical history and problem list    Imaging:    Results  Recent Results (from the past 1 hour(s)) POCT urine dip    Collection Time: 06/27/22  9:22 AM   Result Value Ref Range    LEUKOCYTE ESTERASE,UA -     NITRITE,UA -     SL AMB POCT UROBILINOGEN 0 2     POCT URINE PROTEIN -      PH,UA 5 0     BLOOD,UA -     SPECIFIC GRAVITY,UA 1 030     KETONES,UA -     BILIRUBIN,UA -     GLUCOSE, UA -      COLOR,UA yellow     CLARITY,UA clear    POCT Measure PVR    Collection Time: 06/27/22  9:27 AM   Result Value Ref Range    POST-VOID RESIDUAL VOLUME, ML POC 16 mL   ]  No results found for: PSA  Lab Results   Component Value Date    GLUCOSE 96 04/01/2015    CALCIUM 9 3 06/17/2022     04/01/2015    K 3 6 06/17/2022    CO2 26 06/17/2022     06/17/2022    BUN 14 06/17/2022    CREATININE 1 13 06/17/2022     Lab Results   Component Value Date    WBC 6 72 06/17/2022    HGB 14 3 06/17/2022    HCT 41 6 06/17/2022    MCV 88 06/17/2022     06/17/2022       Please Note:  Voice dictation software has been used to create this document  There may be inadvertent transcriptions errors       Jori Boggs

## 2022-07-11 ENCOUNTER — HOSPITAL ENCOUNTER (OUTPATIENT)
Dept: ULTRASOUND IMAGING | Facility: HOSPITAL | Age: 52
Discharge: HOME | End: 2022-07-11
Payer: COMMERCIAL

## 2022-07-11 DIAGNOSIS — R31.29 MICROSCOPIC HEMATURIA: ICD-10-CM

## 2022-07-11 PROCEDURE — 76770 US EXAM ABDO BACK WALL COMP: CPT

## 2022-07-15 ENCOUNTER — TELEPHONE (OUTPATIENT)
Dept: UROLOGY | Facility: CLINIC | Age: 52
End: 2022-07-15

## 2022-07-15 NOTE — TELEPHONE ENCOUNTER
Contacted patient and made her aware of negative renal ultrasound results  Patient verbalized understanding and was thankful for the call

## 2022-09-26 NOTE — PROGRESS NOTES
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), which continue to be controlled on celecoxib 100mg po bid prn, and Raynaud's syndrome  Take cyclobenzaprine at bedtime for muscle pain, especially in neck  Continue amlodipine daily for blood pressure and Raynaud's symptoms  Continue celecoxib 100mg twice a day as needed for pain     Return to clinic in 1 year    Plan:  Diagnoses and all orders for this visit:    Tietze syndrome  -     celecoxib (CeleBREX) 100 mg capsule; Take 1 capsule (100 mg total) by mouth 2 (two) times a day    Muscle spasm  -     cyclobenzaprine (FLEXERIL) 10 mg tablet; Take 1 tablet (10 mg total) by mouth daily at bedtime    Degenerative cervical disc    Raynaud's disease without gangrene    Hypertension, unspecified type  -     amLODIPine (NORVASC) 10 mg tablet; Take 1 tablet (10 mg total) by mouth daily    NSAID long-term use    Other orders  -     metroNIDAZOLE (FLAGYL) 500 mg tablet; Take 500 mg by mouth 2 (two) times a day   Follow-up plan: Return to clinic in 1 year         Rheumatic Disease Summary  Sole Patel is a 46 y o  female who originally presented 6/5/20 as a Rheumatology consult referred by her PCP Ronald Chong DO for evaluation of possible Teitze syndrome  Patient did seem to have this condition, which is idiopathic in nature, and tends to respond to NSAIDs  It presents with bilateral sternoclavicular swelling and tenderness, heartburn symptoms, and possible radiation of swelling and pain up neck, all of which this patient has  For the time-being, stopped patient's prednisone and started celecoxib 100 mg p o  B i d  She also had some muscle spasm secondary to cervical degenerative disc disease, for which I prescribed cyclobenzaprine 10 mg p o  Q h s   Ordered anti Scl 70 and anticentromere antibody for patient to do when she gets a chance to rule-out diffuse/limited scleroderma as cause of patient's GERD and Raynaud's symptoms; ultimately returned negative  Follow-up 9/14/20:  Tietze syndrome has been controlled since initiating celecoxib, which was continued at 100mg po bid prn, and cyclobenzaprine 10mg po qhs for neck muscle spasms  Her Raynaud's symptoms were controlled on amlodipine, which she is already on for hypertension  9/21/2021: Yamilet Marie is a 46 y o   female who presented for 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), which continue to be controlled on celecoxib 100mg po bid prn  Patient has continued needing cyclobenzaprine 10mg po qhs for neck muscle spasms, even if it has been causing dry mouth  Her Raynaud's symptoms continue to be controlled on amlodipine, which she is already on for hypertension  CBC/CMP ordered for routine lab monitoring  Continue cyclobenzaprine at bedtime as needed for neck muscle spasms  Continue amlodipine daily for blood pressure and Raynaud's symptoms  Continue celecoxib 100mg twice a day as needed for pain  Chief Complaint  Raynaud's disease without gangrene   Tietze syndrome     HPI  Yamilet Marie is a 46 y o   female who presents for follow-up of Teitze syndrome  Last clinic visit was 9/21/2021  Patient has been feeling well overall  Smokes half pack per day of cigarettes  The following portions of the patient's history were reviewed and updated as appropriate: allergies, current medications, past family history, past medical history, past social history, past surgical history and problem list     Review of Systems:   Review of Systems   Constitutional: Negative for fatigue  HENT: Negative for mouth sores  Eyes: Negative for pain  Respiratory: Negative for shortness of breath  Cardiovascular: Positive for chest pain  Negative for leg swelling     Musculoskeletal: Negative for arthralgias and joint swelling  Skin: Negative for rash  Neurological: Negative for weakness  Hematological: Negative for adenopathy  Psychiatric/Behavioral: Negative for sleep disturbance  Home Medications:    Current Outpatient Medications:   •  amLODIPine (NORVASC) 10 mg tablet, Take 1 tablet (10 mg total) by mouth daily, Disp: 90 tablet, Rfl: 3  •  ascorbic acid (VITAMIN C) 250 mg tablet, Take 500 mg by mouth daily, Disp: , Rfl:   •  celecoxib (CeleBREX) 100 mg capsule, Take 1 capsule (100 mg total) by mouth 2 (two) times a day, Disp: 180 capsule, Rfl: 3  •  cyclobenzaprine (FLEXERIL) 10 mg tablet, Take 1 tablet (10 mg total) by mouth daily at bedtime, Disp: 90 tablet, Rfl: 3  •  Multiple Vitamin (multivitamin) tablet, Take 1 tablet by mouth daily, Disp: , Rfl:   •  psyllium (METAMUCIL) 58 6 % packet, Take 1 packet by mouth daily as needed  , Disp: , Rfl:   •  atorvastatin (LIPITOR) 10 mg tablet, Take 1 tablet by mouth once daily, Disp: 90 tablet, Rfl: 0  •  metroNIDAZOLE (FLAGYL) 500 mg tablet, Take 500 mg by mouth 2 (two) times a day, Disp: , Rfl:   •  omeprazole (PriLOSEC) 20 mg delayed release capsule, Take 1 capsule by mouth once daily, Disp: 30 capsule, Rfl: 0    Objective:    Vitals:    09/27/22 1051   Weight: 91 2 kg (201 lb)   Height: 5' 3" (1 6 m)       Physical Exam  Constitutional:       General: She is not in acute distress  HENT:      Head: Normocephalic and atraumatic  Eyes:      Conjunctiva/sclera: Conjunctivae normal    Cardiovascular:      Rate and Rhythm: Normal rate and regular rhythm  Heart sounds: S1 normal and S2 normal      No friction rub  Pulmonary:      Effort: Pulmonary effort is normal  No respiratory distress  Breath sounds: Normal breath sounds  No wheezing, rhonchi or rales  Musculoskeletal:         General: Tenderness present  Cervical back: Neck supple  Comments: Slight left sternoclavicular joint tenderness   Skin:     Coloration: Skin is not pale  Neurological:      Mental Status: She is alert  Mental status is at baseline  Psychiatric:         Mood and Affect: Mood normal          Behavior: Behavior normal        Reviewed labs and imaging  Imaging:   XR chest 6/17/2022  No acute cardiopulmonary disease  CXR 4/9/20: unremarkable     Cervical Spine x-rays 4/9/20  IMPRESSION:  Moderately severe chronic disc degeneration from C4 to T1     Head/Neck Soft Tissue Ultrasound 4/9/20  FINDINGS/IMPRESSION:  1   Normal exam   2   Scattered, morphologically-normal anterior cervical chain lymph nodes are demonstrated   These measure up to 5 mm in the short axis   Upper limit of normal in this area is 15 mm   No pathologic lymphadenopathy demonstrated in the anterior or   posterior cervical chains  3   Left thyroid lobe was also imaged and is normal in size and appearance      Labs:   Office Visit on 06/27/2022   Component Date Value Ref Range Status   • LEUKOCYTE ESTERASE,UA 06/27/2022 -   Final   • Alphonsa Belling 06/27/2022 -   Final   • SL AMB POCT UROBILINOGEN 06/27/2022 0 2   Final   • POCT URINE PROTEIN 06/27/2022 -   Final   •  PH,UA 06/27/2022 5 0   Final   • BLOOD,UA 06/27/2022 -   Final   • SPECIFIC GRAVITY,UA 06/27/2022 1 030   Final   • Kelly Deems 06/27/2022 -   Final   • BILIRUBIN,UA 06/27/2022 -   Final   • GLUCOSE, UA 06/27/2022 -   Final   •  COLOR,UA 06/27/2022 yellow   Final   • CLARITY,UA 06/27/2022 clear   Final   • POST-VOID RESIDUAL VOLUME, ML POC 06/27/2022 16  mL Final   • Case Report 06/27/2022    Final                    Value:Non-gynecologic Cytology                          Case: [id number]                                  Authorizing Provider:  MARANDA Bar         Collected:           06/27/2022 0344              Ordering Location:     [address] [place] [address] For        Received:            06/27/2022 1246 [address]                                     [facility] [address]                                                              Pathologist: Frank Shi DO                                                     Specimen:    Urine, Clean Catch                                                                        • Final Diagnosis 06/27/2022    Final                    Value: This result contains rich text formatting which cannot be displayed here  • Note 06/27/2022    Final                    Value: This result contains rich text formatting which cannot be displayed here  • Gross Description 06/27/2022    Final                    Value: This result contains rich text formatting which cannot be displayed here  • Additional Information 06/27/2022    Final                    Value: This result contains rich text formatting which cannot be displayed here     Admission on 06/17/2022, Discharged on 06/17/2022   Component Date Value Ref Range Status   • WBC 06/17/2022 6 72  4 31 - 10 16 Thousand/uL Final   • RBC 06/17/2022 4 72  3 81 - 5 12 Million/uL Final   • Hemoglobin 06/17/2022 14 3  11 5 - 15 4 g/dL Final   • Hematocrit 06/17/2022 41 6  34 8 - 46 1 % Final   • MCV 06/17/2022 88  82 - 98 fL Final   • MCH 06/17/2022 30 3  26 8 - 34 3 pg Final   • MCHC 06/17/2022 34 4  31 4 - 37 4 g/dL Final   • RDW 06/17/2022 13 5  11 6 - 15 1 % Final   • MPV 06/17/2022 10 8  8 9 - 12 7 fL Final   • Platelets 35/82/3847 255  149 - 390 Thousands/uL Final   • nRBC 06/17/2022 0  /100 WBCs Final   • Neutrophils Relative 06/17/2022 55  43 - 75 % Final   • Immat GRANS % 06/17/2022 0  0 - 2 % Final   • Lymphocytes Relative 06/17/2022 34  14 - 44 % Final   • Monocytes Relative 06/17/2022 8  4 - 12 % Final   • Eosinophils Relative 06/17/2022 2  0 - 6 % Final   • Basophils Relative 06/17/2022 1  0 - 1 % Final   • Neutrophils Absolute 06/17/2022 3 72  1 85 - 7 62 Thousands/µL Final   • Immature Grans Absolute 06/17/2022 0 02  0 00 - 0 20 Thousand/uL Final   • Lymphocytes Absolute 06/17/2022 2 25  0 60 - 4 47 Thousands/µL Final   • Monocytes Absolute 06/17/2022 0 51  0 17 - 1 22 Thousand/µL Final   • Eosinophils Absolute 06/17/2022 0 14  0 00 - 0 61 Thousand/µL Final   • Basophils Absolute 06/17/2022 0 08  0 00 - 0 10 Thousands/µL Final   • Sodium 06/17/2022 138  136 - 145 mmol/L Final   • Potassium 06/17/2022 3 6  3 5 - 5 3 mmol/L Final   • Chloride 06/17/2022 102  100 - 108 mmol/L Final   • CO2 06/17/2022 26  21 - 32 mmol/L Final   • ANION GAP 06/17/2022 10  4 - 13 mmol/L Final   • BUN 06/17/2022 14  5 - 25 mg/dL Final   • Creatinine 06/17/2022 1 13  0 60 - 1 30 mg/dL Final    Standardized to IDMS reference method   • Glucose 06/17/2022 95  65 - 140 mg/dL Final    If the patient is fasting, the ADA then defines impaired fasting glucose as > 100 mg/dL and diabetes as > or equal to 123 mg/dL  Specimen collection should occur prior to Sulfasalazine administration due to the potential for falsely depressed results  Specimen collection should occur prior to Sulfapyridine administration due to the potential for falsely elevated results  • Calcium 06/17/2022 9 3  8 3 - 10 1 mg/dL Final   • AST 06/17/2022 22  5 - 45 U/L Final    Specimen collection should occur prior to Sulfasalazine administration due to the potential for falsely depressed results  • ALT 06/17/2022 28  12 - 78 U/L Final    Specimen collection should occur prior to Sulfasalazine administration due to the potential for falsely depressed results  • Alkaline Phosphatase 06/17/2022 130 (A) 46 - 116 U/L Final   • Total Protein 06/17/2022 8 0  6 4 - 8 2 g/dL Final   • Albumin 06/17/2022 4 2  3 5 - 5 0 g/dL Final   • Total Bilirubin 06/17/2022 0 30  0 20 - 1 00 mg/dL Final    Use of this assay is not recommended for patients undergoing treatment with eltrombopag due to the potential for falsely elevated results     • eGFR 06/17/2022 56  ml/min/1 73sq m Final   • hs TnI 0hr 06/17/2022 2  "Refer to ACS Flowchart"- see link ng/L Final    Comment:                                              Initial (time 0) result  If >=50 ng/L, Myocardial injury suggested ;  Type of myocardial injury and treatment strategy  to be determined  If 5-49 ng/L, a delta result at 2 hours and or 4 hours will be needed to further evaluate  If <4 ng/L, and chest pain has been >3 hours since onset, patient may qualify for discharge based on the HEART score in the ED  If <5 ng/L and <3hours since onset of chest pain, a delta result at 2 hours will be needed to further evaluate  HS Troponin 99th Percentile URL of a Health Population=12 ng/L with a 95% Confidence Interval of 8-18 ng/L  Second Troponin (time 2 hours)  If calculated delta >= 20 ng/L,  Myocardial injury suggested ; Type of myocardial injury and treatment strategy to be determined  If 5-49 ng/L and the calculated delta is 5-19 ng/L, consult medical service for evaluation  Continue evaluation for ischemia on ecg and other possible etiology and repeat hs troponin at 4 hours  If delta                            is <5 ng/L at 2 hours, consider discharge based on risk stratification via the HEART score (if in ED), or RANDI risk score in IP/Observation  HS Troponin 99th Percentile URL of a Health Population=12 ng/L with a 95% Confidence Interval of 8-18 ng/L  • D-Dimer, Quant 06/17/2022 0 29  <0 50 ug/ml FEU Final    Reference and upper limits to exclude DVT and PE are the same  Do not use to exclude if clinical symptoms are present  Pregnant women:  1st trimester:  <0 22 - 1 06 ug/ml FEU  2nd trimester:  <0 22 - 1 88 ug/ml FEU  3rd trimester:   0 24 - 3 28 ug/ml FEU    Note: Normal ranges may not apply to patients who are transgender, non-binary, or whose legal sex, sex at birth, and gender identity differ       • Ventricular Rate 06/17/2022 104  BPM Final   • Atrial Rate 06/17/2022 104  BPM Final   • NE Interval 06/17/2022 158  ms Final   • QRSD Interval 06/17/2022 74  ms Final   • QT Interval 06/17/2022 336  ms Final   • QTC Interval 06/17/2022 441  ms Final   • P Axis 06/17/2022 53 degrees Final   • QRS Axis 06/17/2022 -7  degrees Final   • T Wave [place] 06/17/2022 40  degrees Final   Appointment on 04/20/2022   Component Date Value Ref Range Status   • Cholesterol 04/20/2022 142  See Comment mg/dL Final    Cholesterol:         Pediatric <18 Years        Desirable          <170 mg/dL      Borderline High    170-199 mg/dL      High               >=200 mg/dL        Adult >=18 Years            Desirable         <200 mg/dL      Borderline High   200-239 mg/dL      High              >239 mg/dL     • Triglycerides 04/20/2022 62  See Comment mg/dL Final    Triglyceride:     0-9Y            <75mg/dL     10Y-17Y         <90 mg/dL       >=18Y     Normal          <150 mg/dL     Borderline High 150-199 mg/dL     High            200-499 mg/dL        Very High       >499 mg/dL    Specimen collection should occur prior to N-Acetylcysteine or Metamizole administration due to the potential for falsely depressed results  • HDL, Direct 04/20/2022 66  >=50 mg/dL Final    Specimen collection should occur prior to Metamizole administration due to the potential for falsley depressed results  • LDL Calculated 04/20/2022 64  0 - 100 mg/dL Final    LDL Cholesterol:     Optimal           <100 mg/dl     Near Optimal      100-129 mg/dl     Above Optimal       Borderline High 130-159 mg/dl       High            160-189 mg/dl       Very High       >189 mg/dl         This screening LDL is a calculated result  It does not have the accuracy of the Direct Measured LDL in the monitoring of patients with hyperlipidemia and/or statin therapy  Direct Measure LDL (JRS967) must be ordered separately in these patients     • Non-HDL-Chol (CHOL-HDL) 04/20/2022 76  mg/dl Final   • HIV-1/HIV-2 Ab 04/20/2022 Non-Reactive  Non-Reactive Final   • Hepatitis C Ab 04/20/2022 Non-reactive  Non-reactive Final   • Hepatitis B Surface Ag 04/20/2022 Non-reactive  Non-reactive, NonReactive - Confirmed Final   • Hep B Core Total Ab 04/20/2022 Non-reactive  Non-reactive Final   • Hep B S Ab 04/20/2022 <3 10  mIU/mL Final    Protective Immunity: Hep B Surface Antibody >= 10 mIu/ml (Traceable to [facility] Reference Preparation)   • RPR 04/20/2022 Non-Reactive  Non-Reactive Final   • Sodium 04/20/2022 140  136 - 145 mmol/L Final   • Potassium 04/20/2022 3 9  3 5 - 5 3 mmol/L Final   • Chloride 04/20/2022 111 (A) 100 - 108 mmol/L Final   • CO2 04/20/2022 26  21 - 32 mmol/L Final   • ANION GAP 04/20/2022 3 (A) 4 - 13 mmol/L Final   • BUN 04/20/2022 12  5 - 25 mg/dL Final   • Creatinine 04/20/2022 0 84  0 60 - 1 30 mg/dL Final    Standardized to IDMS reference method   • Glucose, Fasting 04/20/2022 91  65 - 99 mg/dL Final    Specimen collection should occur prior to Sulfasalazine administration due to the potential for falsely depressed results  Specimen collection should occur prior to Sulfapyridine administration due to the potential for falsely elevated results  • Calcium 04/20/2022 9 1  8 3 - 10 1 mg/dL Final   • AST 04/20/2022 22  5 - 45 U/L Final    Specimen collection should occur prior to Sulfasalazine administration due to the potential for falsely depressed results  • ALT 04/20/2022 20  12 - 78 U/L Final    Specimen collection should occur prior to Sulfasalazine and/or Sulfapyridine administration due to the potential for falsely depressed results  • Alkaline Phosphatase 04/20/2022 107  46 - 116 U/L Final   • Total Protein 04/20/2022 7 1  6 4 - 8 2 g/dL Final   • Albumin 04/20/2022 3 6  3 5 - 5 0 g/dL Final   • Total Bilirubin 04/20/2022 0 74  0 20 - 1 00 mg/dL Final    Use of this assay is not recommended for patients undergoing treatment with eltrombopag due to the potential for falsely elevated results     • eGFR 04/20/2022 80  ml/min/1 73sq m Final   • WBC 04/20/2022 6 76  4 31 - 10 16 Thousand/uL Final   • RBC 04/20/2022 4 52  3 81 - 5 12 Million/uL Final   • Hemoglobin 04/20/2022 13 6  11 5 - 15 4 g/dL Final   • Hematocrit 04/20/2022 40 8  34 8 - 46 1 % Final   • MCV 04/20/2022 90  82 - 98 fL Final   • MCH 04/20/2022 30 1  26 8 - 34 3 pg Final   • MCHC 04/20/2022 33 3  31 4 - 37 4 g/dL Final   • RDW 04/20/2022 14 6  11 6 - 15 1 % Final   • Platelets 71/35/2845 254  149 - 390 Thousands/uL Final   • MPV 04/20/2022 11 0  8 9 - 12 7 fL Final   • TSH 3RD GENERATON 04/20/2022 1 750  0 450 - 4 500 uIU/mL Final    The recommended reference ranges for TSH during pregnancy are as follows:   First trimester 0 1 to 2 5 uIU/mL   Second trimester  0 2 to 3 0 uIU/mL   Third trimester 0 3 to 3 0 uIU/m    Note: Normal ranges may not apply to patients who are transgender, non-binary, or whose legal sex, sex at birth, and gender identity differ  Adult TSH (3rd generation) reference range follows the recommended guidelines of the American Thy[employer] Association, January, 2020     • Vit D, 25-Hydroxy 04/20/2022 44 1  30 0 - 100 0 ng/mL Final   Office Visit on 04/18/2022   Component Date Value Ref Range Status   • Color, UA 04/20/2022 Light Yellow   Final   • Clarity, UA 04/20/2022 Turbid   Final   • Specific Gravity, UA 04/20/2022 1 020  1 003 - 1 030 Final   • pH, UA 04/20/2022 5 5  4 5, 5 0, 5 5, 6 0, 6 5, 7 0, 7 5, 8 0 Final   • Leukocytes, UA 04/20/2022 Negative  Negative Final   • Nitrite, UA 04/20/2022 Negative  Negative Final   • Protein, UA 04/20/2022 Trace (A) Negative mg/dl Final   • Glucose, UA 04/20/2022 Negative  Negative mg/dl Final   • Ketones, UA 04/20/2022 Negative  Negative mg/dl Final   • Urobilinogen, UA 04/20/2022 <2 0  <2 0 mg/dl mg/dl Final   • Bilirubin, UA 04/20/2022 Negative  Negative Final   • Occult Blood, UA 04/20/2022 Trace (A) Negative Final   • RBC, UA 04/20/2022 1-2  None Seen, 1-2 /hpf Final   • WBC, UA 04/20/2022 1-2  None Seen, 1-2 /hpf Final   • Epithelial Cells 04/20/2022 Occasional  None Seen, Occasional /hpf Final   • Bacteria, UA 04/20/2022 Occasional  None Seen, Occasional /hpf Final   Appointment on 12/27/2021 Component Date Value Ref Range Status   • Case Report 12/27/2021    Final                    Value:Surgical Pathology Report                         Case: [id number]                                   Authorizing Provider:  Chriss Dietrich DO          Collected:           12/27/2021 1449              Ordering Location:     [facility]  Received:            12/27/2021 1449                                     [place]                                                                       Pathologist:           Nikki Grimaldo MD                                                         Specimen:    Skin, Cyst/Tag/Debridement, tail of right breast                                          • Final Diagnosis 12/27/2021    Final                    Value: This result contains rich text formatting which cannot be displayed here  • Additional Information 12/27/2021    Final                    Value: This result contains rich text formatting which cannot be displayed here  • Gross Description 12/27/2021    Final                    Value: This result contains rich text formatting which cannot be displayed here     • Clinical Information 12/27/2021    Final                    Value:right axilla   Appointment on 12/27/2021   Component Date Value Ref Range Status   • Wound Culture 12/27/2021 1+ Growth of Klebsiella aerogenes (A)  Final   • Wound Culture 12/27/2021 Few Colonies of Staphylococcus coagulase negative (A)  Final   • Gram Stain Result 12/27/2021 No polys seen (A)  Final   • Gram Stain Result 12/27/2021 Rare Epithelial Cells (A)  Final   • Gram Stain Result 12/27/2021 1+ Gram positive cocci in clusters (A)  Final

## 2022-09-27 ENCOUNTER — OFFICE VISIT (OUTPATIENT)
Dept: RHEUMATOLOGY | Facility: CLINIC | Age: 52
End: 2022-09-27
Payer: COMMERCIAL

## 2022-09-27 VITALS — WEIGHT: 201 LBS | HEIGHT: 63 IN | BODY MASS INDEX: 35.61 KG/M2

## 2022-09-27 DIAGNOSIS — M62.838 MUSCLE SPASM: ICD-10-CM

## 2022-09-27 DIAGNOSIS — I73.00 RAYNAUD'S DISEASE WITHOUT GANGRENE: ICD-10-CM

## 2022-09-27 DIAGNOSIS — I10 HYPERTENSION, UNSPECIFIED TYPE: ICD-10-CM

## 2022-09-27 DIAGNOSIS — M94.0 TIETZE SYNDROME: Primary | ICD-10-CM

## 2022-09-27 DIAGNOSIS — M50.30 DEGENERATIVE CERVICAL DISC: ICD-10-CM

## 2022-09-27 DIAGNOSIS — Z79.1 NSAID LONG-TERM USE: ICD-10-CM

## 2022-09-27 PROCEDURE — 99214 OFFICE O/P EST MOD 30 MIN: CPT | Performed by: INTERNAL MEDICINE

## 2022-09-27 RX ORDER — CYCLOBENZAPRINE HCL 10 MG
10 TABLET ORAL
Qty: 90 TABLET | Refills: 3 | Status: SHIPPED | OUTPATIENT
Start: 2022-09-27

## 2022-09-27 RX ORDER — METRONIDAZOLE 500 MG/1
500 TABLET ORAL 2 TIMES DAILY
COMMUNITY
Start: 2022-09-08 | End: 2022-11-02 | Stop reason: HOSPADM

## 2022-09-27 RX ORDER — AMLODIPINE BESYLATE 10 MG/1
10 TABLET ORAL DAILY
Qty: 90 TABLET | Refills: 3 | Status: SHIPPED | OUTPATIENT
Start: 2022-09-27

## 2022-09-27 RX ORDER — CELECOXIB 100 MG/1
100 CAPSULE ORAL 2 TIMES DAILY
Qty: 180 CAPSULE | Refills: 3 | Status: SHIPPED | OUTPATIENT
Start: 2022-09-27

## 2022-09-27 NOTE — PATIENT INSTRUCTIONS
Take cyclobenzaprine at bedtime  Continue amlodipine daily for blood pressure and Raynaud's symptoms  Continue celecoxib 100mg twice a day as needed for pain     Return to clinic in 1 year

## 2022-10-17 DIAGNOSIS — E78.5 DYSLIPIDEMIA: ICD-10-CM

## 2022-10-17 DIAGNOSIS — K21.9 GASTROESOPHAGEAL REFLUX DISEASE: ICD-10-CM

## 2022-10-17 RX ORDER — ATORVASTATIN CALCIUM 10 MG/1
TABLET, FILM COATED ORAL
Qty: 90 TABLET | Refills: 0 | Status: SHIPPED | OUTPATIENT
Start: 2022-10-17

## 2022-10-17 RX ORDER — OMEPRAZOLE 20 MG/1
CAPSULE, DELAYED RELEASE ORAL
Qty: 30 CAPSULE | Refills: 0 | Status: SHIPPED | OUTPATIENT
Start: 2022-10-17

## 2022-11-02 ENCOUNTER — OFFICE VISIT (OUTPATIENT)
Dept: FAMILY MEDICINE CLINIC | Facility: CLINIC | Age: 52
End: 2022-11-02

## 2022-11-02 VITALS
SYSTOLIC BLOOD PRESSURE: 128 MMHG | HEIGHT: 63 IN | RESPIRATION RATE: 16 BRPM | DIASTOLIC BLOOD PRESSURE: 68 MMHG | HEART RATE: 68 BPM | WEIGHT: 199 LBS | TEMPERATURE: 98 F | BODY MASS INDEX: 35.26 KG/M2

## 2022-11-02 DIAGNOSIS — Z00.00 ANNUAL PHYSICAL EXAM: Primary | ICD-10-CM

## 2022-11-02 DIAGNOSIS — E78.5 DYSLIPIDEMIA: ICD-10-CM

## 2022-11-02 DIAGNOSIS — E55.9 VITAMIN D DEFICIENCY: ICD-10-CM

## 2022-11-02 DIAGNOSIS — F17.200 TOBACCO USE DISORDER: ICD-10-CM

## 2022-11-02 DIAGNOSIS — Z13.29 SCREENING FOR THYROID DISORDER: ICD-10-CM

## 2022-11-02 DIAGNOSIS — E66.9 OBESITY (BMI 35.0-39.9 WITHOUT COMORBIDITY): ICD-10-CM

## 2022-11-02 DIAGNOSIS — I10 HYPERTENSION, UNSPECIFIED TYPE: ICD-10-CM

## 2022-11-02 DIAGNOSIS — K21.9 GASTROESOPHAGEAL REFLUX DISEASE: ICD-10-CM

## 2022-11-02 RX ORDER — OMEPRAZOLE 20 MG/1
20 CAPSULE, DELAYED RELEASE ORAL DAILY
Qty: 90 CAPSULE | Refills: 1 | Status: SHIPPED | OUTPATIENT
Start: 2022-11-02

## 2022-11-02 NOTE — PATIENT INSTRUCTIONS

## 2022-11-02 NOTE — PROGRESS NOTES
ADULT ANNUAL [id number] [address] PRIMARY CARE    NAME: Evelia Ruvalcaba  AGE: 46 y o  SEX: female  : 1970     DATE: 2022     Assessment and Plan:  GERD without esophagitis Prilosec 20 mg is effective therapy    Hypertensive cardiovascular disease with blood pressure controlled on the current regimen    Dyslipidemia currently on atorvastatin 10 mg laboratory is pending    Tobacco use disorder the patient is not interested in quitting because she is concerned about possibly gaining weight if she does    Morbid obesity with BMI of 35 25 diet has been discussed     Problem List Items Addressed This Visit        Cardiovascular and Mediastinum    High blood pressure    Relevant Orders    CBC and differential    Comprehensive metabolic panel    Lipid Panel with Direct LDL reflex      Other Visit Diagnoses     Dyslipidemia    -  Primary    Relevant Orders    Lipid Panel with Direct LDL reflex    Gastroesophageal reflux disease        Relevant Medications    omeprazole (PriLOSEC) 20 mg delayed release capsule    Tobacco use disorder        Vitamin D deficiency        Relevant Orders    Vitamin D 25 hydroxy    Screening for thyroid disorder        Relevant Orders    TSH, 3rd generation with Free T4 reflex    Annual physical exam              Immunizations and preventive care screenings were discussed with patient today  Appropriate education was printed on patient's after visit summary  Counseling:  • Alcohol/drug use: discussed moderation in alcohol intake, the recommendations for healthy alcohol use, and avoidance of illicit drug use  • Dental Health: discussed importance of regular tooth brushing, flossing, and dental visits  • Injury prevention: discussed safety/seat belts, safety helmets, smoke detectors, carbon dioxide detectors, and smoking near bedding or upholstery    • Sexual health: discussed sexually transmitted diseases, partner selection, use of condoms, avoidance of unintended pregnancy, and contraceptive alternatives  · Exercise: the importance of regular exercise/physical activity was discussed  Recommend exercise 3-5 times per week for at least 30 minutes  BMI Counseling: Body mass index is 35 25 kg/m²  The BMI is above normal  Nutrition recommendations include decreasing portion sizes, encouraging healthy choices of fruits and vegetables, decreasing fast food intake, consuming healthier snacks, limiting drinks that contain sugar, moderation in carbohydrate intake, increasing intake of lean protein, reducing intake of saturated and trans fat and reducing intake of cholesterol  Exercise recommendations include moderate physical activity 150 minutes/week  Rationale for BMI follow-up plan is due to patient being overweight or obese  Depression Screening and Follow-up Plan: Patient was screened for depression during today's encounter  They screened negative with a PHQ-2 score of 0  Return in about 6 months (around 5/2/2023)  Chief Complaint:     Chief Complaint   Patient presents with   • Annual Exam   • Follow-up     6 month check up       History of Present Illness:     Adult Annual Physical   Patient here for a comprehensive physical exam  The patient reports no problems  Diet and Physical Activity  · Diet/Nutrition: well balanced diet  · Exercise: no formal exercise  Depression Screening  PHQ-2/9 Depression Screening    Little interest or pleasure in doing things: 0 - not at all  Feeling down, depressed, or hopeless: 0 - not at all  PHQ-2 Score: 0  PHQ-2 Interpretation: Negative depression screen       General Health  · Sleep: sleeps well and gets 7-8 hours of sleep on average  · Hearing: normal - bilateral   · Vision: wears glasses  · Dental: no dental visits for >1 year  /GYN Health  · Patient is: perimenopausal  · Last menstrual period: 1yr  · Contraceptive method: Abstain       Review of Systems:     Review of Systems   Constitutional: Negative for chills and fever  HENT: Negative for ear pain and sore throat  Eyes: Negative for pain and visual disturbance  Respiratory: Positive for wheezing  Negative for cough and shortness of breath  Cardiovascular: Negative for chest pain and palpitations  Gastrointestinal: Negative for abdominal pain and vomiting  Genitourinary: Negative for dysuria and hematuria  Musculoskeletal: Negative for arthralgias and back pain  Skin: Negative for color change and rash  Neurological: Negative for seizures and syncope  All other systems reviewed and are negative       Past Medical History:     Past Medical History:   Diagnosis Date   • Acid reflux    • Anxiety    • Anxiety    • Bacterial vaginosis    • Cleft palate    • Costochondritis    • DDD (degenerative disc disease), cervical    • Frequency of urination    • GERD (gastroesophageal reflux disease)    • HPV (human papilloma virus) anogenital infection    • HPV (human papilloma virus) infection    • Hyperhidrosis    • Hyperlipidemia    • Hypertension    • Perforated ear drum    • Raynaud's disease    • Tobacco dependence    • Umbilical hernia    • Vocal cords swelling    • Wears glasses       Past Surgical History:     Past Surgical History:   Procedure Laterality Date   • CERVICAL BIOPSY N/A 9/13/2021    Procedure: CONE BIOPSY OF THE CERVIX;  Surgeon: Yoselyn Browne MD;  Location: [address] [place] [address] OR;  Service: Gynecology   • CLEFT PALATE REPAIR     • TYMPANOPLASTY Left    • TYMPANOSTOMY TUBE PLACEMENT        Social History:     Social History     Socioeconomic History   • Marital status: Single     Spouse name: None   • Number of children: None   • Years of education: None   • Highest education level: None   Occupational History   • Occupation:     Tobacco Use   • Smoking status: Current Every Day Smoker     Packs/day: 1 00     Types: Cigarettes     Start date: 1985   • Smokeless tobacco: Never Used   • Tobacco comment: 04/18/2022-- max 1 pack per day   Vaping Use   • Vaping Use: Never used   Substance and Sexual Activity   • Alcohol use: Not Currently   • Drug use: Never   • Sexual activity: Yes   Other Topics Concern   • None   Social History Narrative    Smoke: 1 pack daily x 25 yrs - As per paper chart      Social Determinants of Health     Financial Resource Strain: Not on file   Food Insecurity: Not on file   Transportation Needs: Not on file   Physical Activity: Not on file   Stress: Not on file   Social Connections: Not on file   Intimate Partner Violence: Not on file   Housing Stability: Not on file      Family History:     Family History   Problem Relation Age of Onset   • Lung cancer Mother    • Mental illness Mother    • Heart disease Father    • Hypertension Father    • Uterine cancer Sister    • No Known Problems Maternal Grandmother    • No Known Problems Maternal Grandfather    • No Known Problems Paternal Grandmother    • No Known Problems Paternal Grandfather    • No Known Problems Maternal Aunt    • Cancer Paternal Aunt         unknown origin       Current Medications:     Current Outpatient Medications   Medication Sig Dispense Refill   • omeprazole (PriLOSEC) 20 mg delayed release capsule Take 1 capsule (20 mg total) by mouth daily 90 capsule 1   • amLODIPine (NORVASC) 10 mg tablet Take 1 tablet (10 mg total) by mouth daily 90 tablet 3   • ascorbic acid (VITAMIN C) 250 mg tablet Take 500 mg by mouth daily     • atorvastatin (LIPITOR) 10 mg tablet Take 1 tablet by mouth once daily 90 tablet 0   • celecoxib (CeleBREX) 100 mg capsule Take 1 capsule (100 mg total) by mouth 2 (two) times a day 180 capsule 3   • cyclobenzaprine (FLEXERIL) 10 mg tablet Take 1 tablet (10 mg total) by mouth daily at bedtime (Patient not taking: Reported on 11/2/2022) 90 tablet 3   • Multiple Vitamin (multivitamin) tablet Take 1 tablet by mouth daily       No current facility-administered medications for this visit  Allergies: Allergies   Allergen Reactions   • Flexeril [Cyclobenzaprine] Other (See Comments)     Patient reported "Dry Mouth"      Physical Exam:     /68   Pulse 68   Temp 98 °F (36 7 °C)   Resp 16   Ht 5' 3" (1 6 m)   Wt 90 3 kg (199 lb)   BMI 35 25 kg/m²     Physical Exam  Vitals and nursing note reviewed  Constitutional:       General: She is not in acute distress  Appearance: She is well-developed  HENT:      Head: Normocephalic and atraumatic  Eyes:      Conjunctiva/sclera: Conjunctivae normal    Cardiovascular:      Rate and Rhythm: Normal rate and regular rhythm  Heart sounds: No murmur heard  Pulmonary:      Effort: Pulmonary effort is normal  No respiratory distress  Breath sounds: Normal breath sounds  Abdominal:      Palpations: Abdomen is soft  Tenderness: There is no abdominal tenderness  Musculoskeletal:      Cervical back: Neck supple  Skin:     General: Skin is warm and dry  Neurological:      Mental Status: She is alert            Aleman Buckle, DO  [facility]

## 2023-01-09 DIAGNOSIS — E78.5 DYSLIPIDEMIA: ICD-10-CM

## 2023-01-09 RX ORDER — ATORVASTATIN CALCIUM 10 MG/1
TABLET, FILM COATED ORAL
Qty: 11 TABLET | Refills: 0 | Status: SHIPPED | OUTPATIENT
Start: 2023-01-09 | End: 2023-01-19 | Stop reason: SDUPTHER

## 2023-01-10 ENCOUNTER — VBI (OUTPATIENT)
Dept: ADMINISTRATIVE | Facility: OTHER | Age: 53
End: 2023-01-10

## 2023-01-10 NOTE — TELEPHONE ENCOUNTER
01/10/23 8:35 AM     [facility] [employer] SmartForm used to document caregap status      [facility]

## 2023-01-19 ENCOUNTER — OFFICE VISIT (OUTPATIENT)
Dept: FAMILY MEDICINE CLINIC | Facility: CLINIC | Age: 53
End: 2023-01-19

## 2023-01-19 VITALS
HEART RATE: 84 BPM | TEMPERATURE: 96.4 F | DIASTOLIC BLOOD PRESSURE: 92 MMHG | BODY MASS INDEX: 35.08 KG/M2 | HEIGHT: 63 IN | WEIGHT: 198 LBS | SYSTOLIC BLOOD PRESSURE: 140 MMHG | RESPIRATION RATE: 20 BRPM

## 2023-01-19 DIAGNOSIS — E78.5 DYSLIPIDEMIA: ICD-10-CM

## 2023-01-19 DIAGNOSIS — J38.3 VOCAL CORDS SWELLING: ICD-10-CM

## 2023-01-19 DIAGNOSIS — J01.00 SUBACUTE MAXILLARY SINUSITIS: Primary | ICD-10-CM

## 2023-01-19 RX ORDER — AZITHROMYCIN 250 MG/1
TABLET, FILM COATED ORAL
Qty: 6 TABLET | Refills: 0 | Status: SHIPPED | OUTPATIENT
Start: 2023-01-19 | End: 2023-01-24

## 2023-01-19 RX ORDER — ATORVASTATIN CALCIUM 10 MG/1
10 TABLET, FILM COATED ORAL DAILY
Qty: 90 TABLET | Refills: 1 | Status: SHIPPED | OUTPATIENT
Start: 2023-01-19

## 2023-01-19 RX ORDER — METHYLPREDNISOLONE 4 MG/1
TABLET ORAL
Qty: 21 EACH | Refills: 0 | Status: SHIPPED | OUTPATIENT
Start: 2023-01-19

## 2023-01-19 NOTE — PROGRESS NOTES
Assessment/Plan: hoarseness times a couple of weeks with a history of a vocal cord ulcer will refer to ENT for evaluation    Maxillary sinusitis will treat with a Medrol Dosepak and Zithromax Z-Sree    Hypertension with blood pressure elevated at today's visit at 140/92 the patient states that she did take her Norvasc 10 mg today  The patient does have a blood pressure cuff and will monitor of blood pressure and will contact us  If it remains elevated for adjustment in her hypertension regimen    Morbid obesity with a BMI of 35 07 diet discussed    Tobacco use disorder the patient was counseled on discontinuing tobacco the patient has failed on Wellbutrin Chantix and nicotine replacement therapy spent approximately 3 minutes discussing tobacco use disorder and quitting tobacco    Problem List Items Addressed This Visit        Other    Vocal cords swelling    Relevant Orders    Ambulatory Referral to Otolaryngology   Other Visit Diagnoses     Subacute maxillary sinusitis    -  Primary    Relevant Medications    methylPREDNISolone 4 MG tablet therapy pack    azithromycin (Zithromax) 250 mg tablet    Other Relevant Orders    Ambulatory Referral to Otolaryngology    Dyslipidemia        Relevant Medications    atorvastatin (LIPITOR) 10 mg tablet           Diagnoses and all orders for this visit:    Subacute maxillary sinusitis  -     Ambulatory Referral to Otolaryngology; Future  -     methylPREDNISolone 4 MG tablet therapy pack; Use as directed on package  -     azithromycin (Zithromax) 250 mg tablet; Take 2 tablets (500 mg total) by mouth daily for 1 day, THEN 1 tablet (250 mg total) daily for 4 days  Dyslipidemia  -     atorvastatin (LIPITOR) 10 mg tablet; Take 1 tablet (10 mg total) by mouth daily    Vocal cords swelling  -     Ambulatory Referral to Otolaryngology; Future        No problem-specific Assessment & Plan notes found for this encounter        PHQ-2/9 Depression Screening    Little interest or pleasure in doing things: 0 - not at all  Feeling down, depressed, or hopeless: 0 - not at all          Body mass index is 35 07 kg/m²  BMI Counseling: Body mass index is 35 07 kg/m²  The BMI   Subjective:      Patient ID: Lucia Lujan is a 46 y o  female  Patient presents with a change in her voice and presumed vocal cord swelling of a few weeks duration 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      The following portions of the patient's history were reviewed and updated as appropriate:   She has a past medical history of Acid reflux, Anxiety, Anxiety, Bacterial vaginosis, Cleft palate, Costochondritis, DDD (degenerative disc disease), cervical, Frequency of urination, GERD (gastroesophageal reflux disease), HPV (human papilloma virus) anogenital infection, HPV (human papilloma virus) infection, Hyperhidrosis, Hyperlipidemia, Hypertension, Perforated ear drum, Raynaud's disease, Tobacco dependence, Umbilical hernia, Vocal cords swelling, and Wears glasses  ,  does not have any pertinent problems on file  ,   has a past surgical history that includes Cleft palate repair; Tympanostomy tube placement; Tympanoplasty (Left); and Cervical biopsy (N/A, 9/13/2021)  ,  family history includes Cancer in her paternal aunt; Heart disease in her father; Hypertension in her father; Lung cancer in her mother; Mental illness in her mother; No Known Problems in her maternal aunt, maternal grandfather, maternal grandmother, paternal grandfather, and paternal grandmother; Uterine cancer in her sister  ,   reports that she has been smoking cigarettes  She started smoking about 38 years ago  She has been smoking an average of 1 pack per day  She has never used smokeless tobacco  She reports that she does not currently use alcohol  She reports that she does not use drugs  ,  is allergic to flexeril [cyclobenzaprine]     Current Outpatient Medications   Medication Sig Dispense Refill   • atorvastatin (LIPITOR) 10 mg tablet Take 1 tablet (10 mg total) by mouth daily 90 tablet 1   • azithromycin (Zithromax) 250 mg tablet Take 2 tablets (500 mg total) by mouth daily for 1 day, THEN 1 tablet (250 mg total) daily for 4 days  6 tablet 0   • methylPREDNISolone 4 MG tablet therapy pack Use as directed on package 21 each 0   • amLODIPine (NORVASC) 10 mg tablet Take 1 tablet (10 mg total) by mouth daily 90 tablet 3   • ascorbic acid (VITAMIN C) 250 mg tablet Take 500 mg by mouth daily     • celecoxib (CeleBREX) 100 mg capsule Take 1 capsule (100 mg total) by mouth 2 (two) times a day 180 capsule 3   • Multiple Vitamin (multivitamin) tablet Take 1 tablet by mouth daily     • omeprazole (PriLOSEC) 20 mg delayed release capsule Take 1 capsule (20 mg total) by mouth daily 90 capsule 1     No current facility-administered medications for this visit  Review of Systems   Constitutional: Negative for chills and fever  HENT: Positive for congestion, sinus pressure and voice change  Negative for ear pain and sore throat  Eyes: Negative for pain and visual disturbance  Respiratory: Negative for cough and shortness of breath  Cardiovascular: Negative for chest pain and palpitations  Gastrointestinal: Negative for abdominal pain and vomiting  Genitourinary: Negative for dysuria and hematuria  Musculoskeletal: Negative for arthralgias and back pain  Skin: Negative for color change and rash  Neurological: Negative for seizures and syncope  All other systems reviewed and are negative  Objective:    /92   Pulse 84   Temp (!) 96 4 °F (35 8 °C)   Resp 20   Ht 5' 3" (1 6 m)   Wt 89 8 kg (198 lb)   BMI 35 07 kg/m²   Body mass index is 35 07 kg/m²  Physical Exam  Constitutional:       Appearance: She is well-developed  She is obese  HENT:      Head: Normocephalic  Nose: Congestion present  Right Sinus: Maxillary sinus tenderness present        Left Sinus: Maxillary sinus tenderness present  Eyes:      Pupils: Pupils are equal, round, and reactive to light  Cardiovascular:      Rate and Rhythm: Normal rate and regular rhythm  Heart sounds: Normal heart sounds  Pulmonary:      Effort: Pulmonary effort is normal       Breath sounds: Normal breath sounds  Abdominal:      General: Bowel sounds are normal       Palpations: Abdomen is soft  Tenderness: There is no abdominal tenderness  Musculoskeletal:      Cervical back: Normal range of motion  Skin:     General: Skin is warm  Neurological:      Mental Status: She is alert and oriented to person, place, and time

## 2023-01-24 ENCOUNTER — TELEPHONE (OUTPATIENT)
Dept: FAMILY MEDICINE CLINIC | Facility: CLINIC | Age: 53
End: 2023-01-24

## 2023-02-20 ENCOUNTER — APPOINTMENT (OUTPATIENT)
Dept: LAB | Facility: HOSPITAL | Age: 53
End: 2023-02-20
Attending: FAMILY MEDICINE

## 2023-02-20 ENCOUNTER — OFFICE VISIT (OUTPATIENT)
Dept: FAMILY MEDICINE CLINIC | Facility: CLINIC | Age: 53
End: 2023-02-20

## 2023-02-20 VITALS
DIASTOLIC BLOOD PRESSURE: 84 MMHG | BODY MASS INDEX: 35.44 KG/M2 | WEIGHT: 200 LBS | HEIGHT: 63 IN | SYSTOLIC BLOOD PRESSURE: 162 MMHG | HEART RATE: 84 BPM | TEMPERATURE: 97.1 F | RESPIRATION RATE: 20 BRPM

## 2023-02-20 DIAGNOSIS — M94.0 TIETZE SYNDROME: ICD-10-CM

## 2023-02-20 DIAGNOSIS — J38.3: ICD-10-CM

## 2023-02-20 DIAGNOSIS — R13.10 DYSPHAGIA, UNSPECIFIED TYPE: Primary | ICD-10-CM

## 2023-02-20 DIAGNOSIS — Z72.0 TOBACCO USE: ICD-10-CM

## 2023-02-20 DIAGNOSIS — Z13.29 SCREENING FOR THYROID DISORDER: ICD-10-CM

## 2023-02-20 DIAGNOSIS — Z12.2 ENCOUNTER FOR SCREENING FOR MALIGNANT NEOPLASM OF LUNG IN CURRENT SMOKER WITH 30 PACK YEAR HISTORY OR GREATER: ICD-10-CM

## 2023-02-20 DIAGNOSIS — H72.91 PERFORATION OF RIGHT TYMPANIC MEMBRANE: ICD-10-CM

## 2023-02-20 DIAGNOSIS — E78.5 DYSLIPIDEMIA: ICD-10-CM

## 2023-02-20 DIAGNOSIS — E55.9 VITAMIN D DEFICIENCY: ICD-10-CM

## 2023-02-20 DIAGNOSIS — F17.200 ENCOUNTER FOR SCREENING FOR MALIGNANT NEOPLASM OF LUNG IN CURRENT SMOKER WITH 30 PACK YEAR HISTORY OR GREATER: ICD-10-CM

## 2023-02-20 DIAGNOSIS — I10 HYPERTENSION, UNSPECIFIED TYPE: ICD-10-CM

## 2023-02-20 DIAGNOSIS — B97.7 HPV (HUMAN PAPILLOMA VIRUS) INFECTION: ICD-10-CM

## 2023-02-20 DIAGNOSIS — K21.9 GASTROESOPHAGEAL REFLUX DISEASE WITHOUT ESOPHAGITIS: ICD-10-CM

## 2023-02-20 LAB
25(OH)D3 SERPL-MCNC: 30.6 NG/ML (ref 30–100)
ALBUMIN SERPL BCP-MCNC: 4.1 G/DL (ref 3.5–5)
ALP SERPL-CCNC: 123 U/L (ref 46–116)
ALT SERPL W P-5'-P-CCNC: 39 U/L (ref 12–78)
ANION GAP SERPL CALCULATED.3IONS-SCNC: 12 MMOL/L (ref 4–13)
AST SERPL W P-5'-P-CCNC: 28 U/L (ref 5–45)
BASOPHILS # BLD AUTO: 0.06 THOUSANDS/ÂΜL (ref 0–0.1)
BASOPHILS NFR BLD AUTO: 1 % (ref 0–1)
BILIRUB SERPL-MCNC: 0.35 MG/DL (ref 0.2–1)
BUN SERPL-MCNC: 11 MG/DL (ref 5–25)
CALCIUM SERPL-MCNC: 9.5 MG/DL (ref 8.3–10.1)
CHLORIDE SERPL-SCNC: 105 MMOL/L (ref 96–108)
CHOLEST SERPL-MCNC: 185 MG/DL
CO2 SERPL-SCNC: 25 MMOL/L (ref 21–32)
CREAT SERPL-MCNC: 0.76 MG/DL (ref 0.6–1.3)
EOSINOPHIL # BLD AUTO: 0.06 THOUSAND/ÂΜL (ref 0–0.61)
EOSINOPHIL NFR BLD AUTO: 1 % (ref 0–6)
ERYTHROCYTE [DISTWIDTH] IN BLOOD BY AUTOMATED COUNT: 13.2 % (ref 11.6–15.1)
GFR SERPL CREATININE-BSD FRML MDRD: 90 ML/MIN/1.73SQ M
GLUCOSE P FAST SERPL-MCNC: 100 MG/DL (ref 65–99)
HCT VFR BLD AUTO: 43.3 % (ref 34.8–46.1)
HDLC SERPL-MCNC: 90 MG/DL
HGB BLD-MCNC: 14.9 G/DL (ref 11.5–15.4)
IMM GRANULOCYTES # BLD AUTO: 0.02 THOUSAND/UL (ref 0–0.2)
IMM GRANULOCYTES NFR BLD AUTO: 0 % (ref 0–2)
LDLC SERPL CALC-MCNC: 84 MG/DL (ref 0–100)
LYMPHOCYTES # BLD AUTO: 1.43 THOUSANDS/ÂΜL (ref 0.6–4.47)
LYMPHOCYTES NFR BLD AUTO: 23 % (ref 14–44)
MCH RBC QN AUTO: 31.3 PG (ref 26.8–34.3)
MCHC RBC AUTO-ENTMCNC: 34.4 G/DL (ref 31.4–37.4)
MCV RBC AUTO: 91 FL (ref 82–98)
MONOCYTES # BLD AUTO: 0.46 THOUSAND/ÂΜL (ref 0.17–1.22)
MONOCYTES NFR BLD AUTO: 7 % (ref 4–12)
NEUTROPHILS # BLD AUTO: 4.22 THOUSANDS/ÂΜL (ref 1.85–7.62)
NEUTS SEG NFR BLD AUTO: 68 % (ref 43–75)
NRBC BLD AUTO-RTO: 0 /100 WBCS
PLATELET # BLD AUTO: 269 THOUSANDS/UL (ref 149–390)
PMV BLD AUTO: 10.2 FL (ref 8.9–12.7)
POTASSIUM SERPL-SCNC: 3.5 MMOL/L (ref 3.5–5.3)
PROT SERPL-MCNC: 8.1 G/DL (ref 6.4–8.4)
RBC # BLD AUTO: 4.76 MILLION/UL (ref 3.81–5.12)
SODIUM SERPL-SCNC: 142 MMOL/L (ref 135–147)
TRIGL SERPL-MCNC: 53 MG/DL
WBC # BLD AUTO: 6.25 THOUSAND/UL (ref 4.31–10.16)

## 2023-02-20 RX ORDER — METHYLPREDNISOLONE 4 MG/1
TABLET ORAL
Qty: 21 EACH | Refills: 0 | Status: SHIPPED | OUTPATIENT
Start: 2023-02-20

## 2023-02-20 RX ORDER — DIAZEPAM 2 MG/1
2 TABLET ORAL EVERY 6 HOURS PRN
Qty: 30 TABLET | Refills: 0 | Status: SHIPPED | OUTPATIENT
Start: 2023-02-20

## 2023-02-20 NOTE — PROGRESS NOTES
Assessment/Plan: Patient has a history of HPV oral infection, history of a vocal cord ulcer and a greater than 30-pack-year history of tobacco use  Complaining of dysphagia and neck swelling CT is to rule out mass     Plan is to get a video swallow CT scan of the neck low-dose CT of the chest   As part of the lung cancer screening program   The patient has an upcoming appointment with ENT to assess the vocal cords  tietze syndrome the plan is to add short course of Medrol in the form of a Medrol Dosepak    Hiatal hernia the patient currently is taking Prilosec 20 mg daily    Hypertension with a blood pressure elevated at 162/84 the patient was advised to keep an eye on the blood pressure the patient is quite anxious at today's visit    Consider possibility of esophageal spasm will prescribe Valium 2 mg 3 times daily number 30 tablets  The PDMP has been reviewed no issues found no evidence of divergence or abuse    Problem List Items Addressed This Visit        Digestive    Dysphagia - Primary   Other Visit Diagnoses     Subacute maxillary sinusitis               Diagnoses and all orders for this visit:    Dysphagia, unspecified type    Subacute maxillary sinusitis        No problem-specific Assessment & Plan notes found for this encounter  PHQ-2/9 Depression Screening            Body mass index is 35 43 kg/m²  BMI Counseling: Body mass index is 35 43 kg/m²  The BMI     Subjective:      Patient ID: General Morales is a 46 y o  female      HPI    The following portions of the patient's history were reviewed and updated as appropriate:   She has a past medical history of Acid reflux, Anxiety, Anxiety, Bacterial vaginosis, Cleft palate, Costochondritis, DDD (degenerative disc disease), cervical, Frequency of urination, GERD (gastroesophageal reflux disease), HPV (human papilloma virus) anogenital infection, HPV (human papilloma virus) infection, Hyperhidrosis, Hyperlipidemia, Hypertension, Perforated ear drum, Raynaud's disease, Tobacco dependence, Umbilical hernia, Vocal cords swelling, and Wears glasses  ,  does not have any pertinent problems on file  ,   has a past surgical history that includes Cleft palate repair; Tympanostomy tube placement; Tympanoplasty (Left); and Cervical biopsy (N/A, 9/13/2021)  ,  family history includes Cancer in her paternal aunt; Heart disease in her father; Hypertension in her father; Lung cancer in her mother; Mental illness in her mother; No Known Problems in her maternal aunt, maternal grandfather, maternal grandmother, paternal grandfather, and paternal grandmother; Uterine cancer in her sister  ,   reports that she has been smoking cigarettes  She started smoking about 38 years ago  She has been smoking an average of 1 pack per day  She has never used smokeless tobacco  She reports that she does not currently use alcohol  She reports that she does not use drugs  ,  is allergic to flexeril [cyclobenzaprine]     Current Outpatient Medications   Medication Sig Dispense Refill   • amLODIPine (NORVASC) 10 mg tablet Take 1 tablet (10 mg total) by mouth daily 90 tablet 3   • ascorbic acid (VITAMIN C) 250 mg tablet Take 500 mg by mouth daily     • atorvastatin (LIPITOR) 10 mg tablet Take 1 tablet (10 mg total) by mouth daily 90 tablet 1   • celecoxib (CeleBREX) 100 mg capsule Take 1 capsule (100 mg total) by mouth 2 (two) times a day 180 capsule 3   • methylPREDNISolone 4 MG tablet therapy pack Use as directed on package (Patient not taking: Reported on 2/20/2023) 21 each 0   • Multiple Vitamin (multivitamin) tablet Take 1 tablet by mouth daily     • omeprazole (PriLOSEC) 20 mg delayed release capsule Take 1 capsule (20 mg total) by mouth daily 90 capsule 1     No current facility-administered medications for this visit  Review of Systems   Constitutional: Negative for chills and fever  HENT: Negative for ear pain and sore throat  Eyes: Negative for pain and visual disturbance  Respiratory: Negative for cough and shortness of breath  Cardiovascular: Negative for chest pain and palpitations  Costochondral chest pain   Gastrointestinal: Negative for abdominal pain and vomiting  Difficulty swallowing with food getting stuck   Genitourinary: Negative for dysuria and hematuria  Musculoskeletal: Positive for neck pain  Negative for arthralgias and back pain  Skin: Negative for color change and rash  Neurological: Negative for seizures and syncope  All other systems reviewed and are negative  Objective:    /84   Pulse 84   Temp (!) 97 1 °F (36 2 °C)   Resp 20   Ht 5' 3" (1 6 m)   Wt 90 7 kg (200 lb)   BMI 35 43 kg/m²   Body mass index is 35 43 kg/m²  Physical Exam  Constitutional:       Appearance: Normal appearance  She is well-developed  She is obese  HENT:      Head: Normocephalic and atraumatic  Right Ear: Ear canal and external ear normal       Left Ear: Tympanic membrane, ear canal and external ear normal       Ears:      Comments: Tympanic membrane perforation     Nose: Nose normal       Mouth/Throat:      Mouth: Mucous membranes are moist       Pharynx: Oropharynx is clear  Eyes:      Extraocular Movements: Extraocular movements intact  Conjunctiva/sclera: Conjunctivae normal       Pupils: Pupils are equal, round, and reactive to light  Cardiovascular:      Rate and Rhythm: Normal rate and regular rhythm  Pulses: Normal pulses  Heart sounds: Normal heart sounds  Comments: Costochondral tenderness bilaterally  Pulmonary:      Effort: Pulmonary effort is normal       Breath sounds: Normal breath sounds  Abdominal:      General: Abdomen is flat  Bowel sounds are normal       Palpations: Abdomen is soft  Tenderness: There is no abdominal tenderness  Musculoskeletal:         General: Normal range of motion  Cervical back: Normal range of motion and neck supple     Skin:     General: Skin is warm and dry       Capillary Refill: Capillary refill takes less than 2 seconds  Neurological:      General: No focal deficit present  Mental Status: She is alert and oriented to person, place, and time  Psychiatric:         Mood and Affect: Mood normal          Behavior: Behavior normal          Thought Content:  Thought content normal          Judgment: Judgment normal

## 2023-02-21 LAB — TSH SERPL DL<=0.05 MIU/L-ACNC: 2.35 UIU/ML (ref 0.45–4.5)

## 2023-02-22 ENCOUNTER — TELEPHONE (OUTPATIENT)
Dept: OTHER | Facility: OTHER | Age: 53
End: 2023-02-22

## 2023-02-22 ENCOUNTER — TELEPHONE (OUTPATIENT)
Dept: OBGYN CLINIC | Facility: HOSPITAL | Age: 53
End: 2023-02-22

## 2023-02-22 DIAGNOSIS — M94.0 TIETZE SYNDROME: Primary | ICD-10-CM

## 2023-02-22 NOTE — TELEPHONE ENCOUNTER
Patient has been expreriencing chest neck and shoulder tightness and swelling, with accompanying pain  Relayed info about doubling celebrex to patient as well as PT orders

## 2023-02-22 NOTE — TELEPHONE ENCOUNTER
Caller: Tete Bowers    Doctor: Dora Malin    Reason for call: Patient called she is having a flare up  She would like to start PT and is wondering if she should up her medication       Call back#: [phone]

## 2023-02-22 NOTE — TELEPHONE ENCOUNTER
[facility] [place] needs an urgent call back from the office regarding a peer to peer needed for the patients upcoming [address]    Please call back to discuss

## 2023-02-23 ENCOUNTER — APPOINTMENT (OUTPATIENT)
Dept: CT IMAGING | Facility: HOSPITAL | Age: 53
End: 2023-02-23
Attending: FAMILY MEDICINE

## 2023-02-23 ENCOUNTER — HOSPITAL ENCOUNTER (OUTPATIENT)
Dept: CT IMAGING | Facility: HOSPITAL | Age: 53
Discharge: HOME | End: 2023-02-23
Attending: FAMILY MEDICINE

## 2023-02-23 ENCOUNTER — HOSPITAL ENCOUNTER (OUTPATIENT)
Dept: RADIOLOGY | Facility: HOSPITAL | Age: 53
End: 2023-02-23
Attending: FAMILY MEDICINE

## 2023-02-23 DIAGNOSIS — F17.200 ENCOUNTER FOR SCREENING FOR MALIGNANT NEOPLASM OF LUNG IN CURRENT SMOKER WITH 30 PACK YEAR HISTORY OR GREATER: ICD-10-CM

## 2023-02-23 DIAGNOSIS — Z12.2 ENCOUNTER FOR SCREENING FOR MALIGNANT NEOPLASM OF LUNG IN CURRENT SMOKER WITH 30 PACK YEAR HISTORY OR GREATER: ICD-10-CM

## 2023-02-23 NOTE — PROGRESS NOTES
Discussed on [id number] the study ordered CT of the head was an error will require a CT of the neck

## 2023-02-23 NOTE — TELEPHONE ENCOUNTER
Spoke to patient that a CT of Neck will be ordered - to call and schedule same and reschedule the swallow test - patient understood

## 2023-02-28 ENCOUNTER — TELEPHONE (OUTPATIENT)
Dept: FAMILY MEDICINE CLINIC | Facility: CLINIC | Age: 53
End: 2023-02-28

## 2023-02-28 NOTE — TELEPHONE ENCOUNTER
unable to get PA without the video swallow testing results - cancelled the PA request (ref # [id number]) and cancelled the test - did leave a message on Helen's VM that the CT Scan has been cancelled but she should go through with the video swallow test and see ENT on the 14th and he will order any testing that he deems necessary

## 2023-02-28 NOTE — TELEPHONE ENCOUNTER
[facility]; [address] [facility]  The prior authorization request for this study has not been approved  You can schedule a peer to peer by calling [facility] [phone] Ref # [id number] with the deadline date of 02/28/23  The insurance determined the following:     Insurance is requesting results of modified barium swallow  This test is scheduled the same day as the CT Scan  If you choose not to complete a peer to peer then please reply to this message to let us know  Please notify your patient and contact central scheduling by calling [phone] to cancel the appointment and cancel the order in Epic       Thank you,     [facility] Dept   [phone]

## 2023-03-02 ENCOUNTER — APPOINTMENT (OUTPATIENT)
Dept: CT IMAGING | Facility: HOSPITAL | Age: 53
End: 2023-03-02
Attending: FAMILY MEDICINE

## 2023-03-02 ENCOUNTER — HOSPITAL ENCOUNTER (OUTPATIENT)
Dept: RADIOLOGY | Facility: HOSPITAL | Age: 53
Discharge: HOME | End: 2023-03-02
Attending: FAMILY MEDICINE

## 2023-03-02 DIAGNOSIS — R13.10 DYSPHAGIA, UNSPECIFIED TYPE: ICD-10-CM

## 2023-03-02 NOTE — PROCEDURES
Speech Pathology Videofluoroscopic Swallow Study (VFSS/VBSS/MBSS)    Patient Name: Zackery Delgadillo  XRRKT'A Date: 3/2/2023     Problem List  Active Problems:    * No active hospital problems  *    Past Medical History  Past Medical History:   Diagnosis Date   • Acid reflux    • Anxiety    • Anxiety    • Bacterial vaginosis    • Cleft palate    • Costochondritis    • DDD (degenerative disc disease), cervical    • Frequency of urination    • GERD (gastroesophageal reflux disease)    • HPV (human papilloma virus) anogenital infection    • HPV (human papilloma virus) infection    • Hyperhidrosis    • Hyperlipidemia    • Hypertension    • Perforated ear drum    • Raynaud's disease    • Tobacco dependence    • Umbilical hernia    • Vocal cords swelling    • Wears glasses      Past Surgical History  Past Surgical History:   Procedure Laterality Date   • CERVICAL BIOPSY N/A 9/13/2021    Procedure: CONE BIOPSY OF THE CERVIX;  Surgeon: Amaya Pathak MD;  Location: [address] [place] [address] OR;  Service: Gynecology   • CLEFT PALATE REPAIR     • TYMPANOPLASTY Left    • TYMPANOSTOMY TUBE PLACEMENT       General Information;  Pt is a 46 y o  female with a PMH remarkable for tiente syndrome,  acid reflux, vocal fold swelling, contact ulcers of vocal folds, and dysphagia  Current concerns for dysphagia include change in voice, history of acid reflux and dysphagia  A VFSS was recommended to assess oropharyngeal stage swallowing skills at this time  Pt was viewed in lateral and assessed with 5-mL thin liquid x2, 20-mL think liquid, 40-mL thin liquid sequential swallow, 5-mL nectar thick, 20-mL nectar thick, 40-mL nectar thick sequential swallow, 5-mL honey thick, 5-mL tsp pudding, regular solid (olga cracker), 40-mL thin via straw, 13mm pill with thin liquid  Patient was viewed in anterior-posterior and assessed with 5-mL tsp pudding and 5-mL tsp nectar thick  Oral stage:  Pt presented with mild oral stage dysphagia      Mastication was timely and grossly effective with materials administered today  Piecemeal deglutition was noted with puree and regular solid  Bolus formation was functional  Mild oral residue was present with all trials this date  Oral control appeared adequate with no gross premature spillage over the base of tongue  Pharyngeal stage:  Pt presented with mild pharyngeal dysphagia  Velar elevation noted  Swallowing initiation was minimally delayed with 20-mL thin liquid cup sip and 40-mL sequential swallow of thin liquid  Laryngeal rise and anterior hyoid excursion appeared adequate  Incomplete epiglotic inversion was noted, however, airway entrance closure appeared adequate with no asp/pen  Tongue base retraction appeared to be mildly reduced as evidenced by moderate vallecular residue with all trials  Secondary swallow allowed for partial clearance of vallecular residue  Complete clearance was prevented by curled epiglottic tip  Pharyngeal constriction suspected or appeared adequate  Presence of small protrusions suspected of osteophytes at the level of C4-C5 noted did not effect swallow  PES opening was adequate  Management of food/liquid/barium tablet follows: All food, liquid and the barium tablet passed through the pharynx safely and efficiently (ie no laryngeal penetration, aspiration or significant pharyngeal residue noted today)  Strategies and Efficacy: Use of secondary swallow able to be implemented but minimally effective  Educated on possible use of chin tuck  Aspiration Response and Efficacy:  No occurrence of aspiration  Esophageal stage:  Brief view of esophagus was completed after administration of the barium tablet  Esophageal tightening and retention was noted  Assessment Summary:    Pt presents with mild oropharyngeal dysphagia characterized by piecemeal deglutition, mild oral retention, mod vallecular retention, and reduced glottic inversion   ST services not warranted at this time due to min risk of asp/pen and structural deficits unable to be addressed in therapy  Education and continued independent use of compensatory strategies recommended Note: Images are available for review in PACS as desired  NOMS (National Outcome Measurement System): Swallowing "Score"    LEVEL 7: The individual’s ability to eat independently is not limited by swallow function  Swallowing would be safe and efficient for all consistencies  Compensatory strategies are effectively used when needed    Recommendations:   Recommended Diet:  regular diet and thin liquids   Recommended Form of Medications: whole with liquid   Aspiration precautions and compensatory swallowing strategies: upright posture, small bites/sips and secondary swallow  Consider referral to:  None at this time  SLP Dysphagia therapy recommended: Not at this time    Results Reviewed with: patient and MD   Pt/Family Education: VBS findings and recommendations immediately reviewed with pt with use of images to aid in understanding  Education provided on strategies to optimize swallow safety, including the importance of oral care and s/s aspiration to monitor for and notify medical team if should they arise  Pt verbalized understanding and denied any further questions at this time  Pt and caregivers would benefit from/require continued education      Patient Stated Goal: Patient did not state goal     Dysphagia Goals per SLP: none at this time

## 2023-03-03 NOTE — PROGRESS NOTES
PT Evaluation     Today's date: 3/6/23  Patient name: Imelda Vazquez  : 1970  MRN: [id number]  Referring provider: Marta Reyes DO  Dx:   Encounter Diagnosis     ICD-10-CM    1  Tietze syndrome  M94 0                      Assessment  Assessment details: Imelda Vazuqez is a 46 y o  female presenting to outpatient physical therapy with noted impairments including pain, impaired soft tissue mobility, reduced range of motion, reduced strength, reduced postural awareness, and reduced activity tolerance  Signs and symptoms at present are consistent with referring diagnosis of Tietze's syndrome (costochondritis)  Due to noted impairments, the patient's present functional limitations include difficulty with ADLs with increased need for assistance, reliance on medication and/or modalities for pain relief, reduced tolerance for functional mobility and activity, and difficulty completing work and self care responsibilities  Patient to benefit from skilled outpatient physical therapy 2x/week for 4-6 weeks in order to reduce pain, maximize pain free range of motion, increase strength and stability, and improve functional mobility/functional activity in order to maximize return to prior level of function with reduced limitations  Home exercise program was provided and all questions answered to patient's level of satisfaction  Thank you for your referral         Impairments: abnormal or restricted ROM, abnormal movement, activity intolerance, impaired physical strength, lacks appropriate home exercise program and pain with function  Understanding of Dx/Px/POC: good   Prognosis: good    Goals  STGs to be achieved in 4 weeks:  1  Pt to demonstrate reduced subjective pain rating "at worst" by at least 2-3 points from Initial Eval in order to allow for reduced pain with ADLs and improved functional activity tolerance     2  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  3 Pt to demonstrate increased MMT of B shld flex by at least 1/2-1 grade in order to improve safety and stability with ADLs and functional mobility  LTGs to be achieved in 6-8 weeks:  1  Pt will be I with HEP in order to continue to improve quality of life and independence and reduce risk for re-injury  2  Pt to demonstrate return to [facility] chores and self care  without limitations or restrictions  3  Pt to demonstrate improved function as noted by achieving or exceeding predicted score on FOTO outcomes assessment tool  Plan  Patient would benefit from: skilled physical therapy  Other planned modality interventions: Modalities prn for symptom management  Planned therapy interventions: manual therapy, neuromuscular re-education, therapeutic activities, therapeutic exercise, strengthening, stretching and home exercise program  Frequency: 2x week  Duration in weeks: 6  Plan of Care beginning date: 3/6/2023  Plan of Care expiration date: 2023  Treatment plan discussed with: PTA and patient        Subjective Evaluation    History of Present Illness  Mechanism of injury: Pt having flare up of Tietze's syndrome  Pt also has swollen vocal cords  Pt notes she has been active setting up speakers and moving furniture  Feels this has flared up the Tietze's syndrome  Pt is on anti inflammatory meds  Had good relief with last course of PT Pt having diff with swallowing and had swallow study completed a couple of weeks ago  Pt has swelling in sternal area, tightness in her neck , soreness in chest and trouble swallowing  Sx are bilateral           Recurrent probem    Quality of life: fair    Pain  Current pain ratin (in neck, not sharp, more discomfort, squeezing)  At best pain rating: 3  At worst pain ratin  Quality: discomfort and squeezing  Alleviating factors: nothing    Progression: no change    Social Support  Lives with: significant other    Employment status: working ([employer], 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[employer], entertainer)  Hand dominance: right      Diagnostic Tests    FCE comments: Swallow study-  Per pt "epiglottis issue so food at time does not go down right"Treatments  Previous treatment: physical therapy and medication  Current treatment: medication and physical therapy  Patient Goals  Patient goals for therapy: decreased pain, decreased edema, return to work, independence with ADLs/IADLs and return to sport/leisure activities  Patient goal: be able to watch TV overhead w/o gagging sensation        Objective     Concurrent Complaints  Negative for disturbed sleep (very minor)    Postural Observations  Seated posture: fair    Additional Postural Observation Details  Fwd head, rounded shldrs    Tenderness   Cervical Spine   Tenderness in the sternum, left ribs/costal cartilage and right ribs/costal cartilage       Active Range of Motion   Cervical/Thoracic Spine     Normal active range of motion    Strength/Myotome Testing     Left Shoulder     Planes of Motion   Flexion: 4-   Abduction: 4+   External rotation at 0°: 4+   Internal rotation at 0°: 4+     Right Shoulder     Planes of Motion   Flexion: 4-   Abduction: 4+   External rotation at 0°: 4+   Internal rotation at 0°: 4+     Left Elbow   Flexion: 5  Extension: 5    Right Elbow   Flexion: 5  Extension: 5    General Comments:    Upper quarter screen   Shoulder: unremarkable  Elbow: unremarkable  Hand/wrist: unremarkable             Precautions: see PMH, anxiety, costochondritis      Re-eval Date: 4/17/23    Date 3/6/23       Visit Count 1       FOTO Comp 3/6       Pain In See eval       Pain Out See eval             Manuals 3/6                                       Neuro Re-Ed        scap retraction        MTPs/LTPs                                                Ther Ex        shld rolls Rev for HEP       Trunk rot with cane Rev for HEP       Trunk rot with cane behind head Rev for HEP       LTR stretch        Towel between scap and spine, roll back and forth        Bolster under non painful side, sidebend stretch        Doorway stretch Rev for HEP       UBE                 Ther Activity                        Gait Training                        Modalities

## 2023-03-06 ENCOUNTER — EVALUATION (OUTPATIENT)
Dept: PHYSICAL THERAPY | Facility: CLINIC | Age: 53
End: 2023-03-06

## 2023-03-06 DIAGNOSIS — M94.0 TIETZE SYNDROME: Primary | ICD-10-CM

## 2023-03-06 NOTE — LETTER
2023    Ana Pina DO  [address] [facility]  [address]  [address]    Patient: Khadijah Faith   YOB: 1970   Date of Visit: 3/6/2023     Encounter Diagnosis     ICD-10-CM    1  Tietze syndrome  M94 0           Dear Dr Ladi Hernandez: Thank you for your recent referral of Khadijah Faith  Please review the attached evaluation summary from Helen's recent visit  Please verify that you agree with the plan of care by signing the attached order  If you have any questions or concerns, please do not hesitate to call  I sincerely appreciate the opportunity to share in the care of one of your patients and hope to have another opportunity to work with you in the near future  Sincerely,    Edgar Jiménez, PT      Referring Provider:      I certify that I have read the below Plan of Care and certify the need for these services furnished under this plan of treatment while under my care  Ana GranaDO  [address]  [address]  [address] [address]  [address] [place]          PT Evaluation     Today's date: 3/6/23  Patient name: Khadijah Faith  : 1970  MRN: [id number]  Referring provider: Sue Nur DO  Dx:   Encounter Diagnosis     ICD-10-CM    1  Tietze syndrome  M94 0                      Assessment  Assessment details: Khadijah Faith is a 46 y o  female presenting to outpatient physical therapy with noted impairments including pain, impaired soft tissue mobility, reduced range of motion, reduced strength, reduced postural awareness, and reduced activity tolerance  Signs and symptoms at present are consistent with referring diagnosis of Tietze's syndrome (costochondritis)   Due to noted impairments, the patient's present functional limitations include difficulty with ADLs with increased need for assistance, reliance on medication and/or modalities for pain relief, reduced tolerance for functional mobility and activity, and difficulty completing work and self care responsibilities  Patient to benefit from skilled outpatient physical therapy 2x/week for 4-6 weeks in order to reduce pain, maximize pain free range of motion, increase strength and stability, and improve functional mobility/functional activity in order to maximize return to prior level of function with reduced limitations  Home exercise program was provided and all questions answered to patient's level of satisfaction  Thank you for your referral         Impairments: abnormal or restricted ROM, abnormal movement, activity intolerance, impaired physical strength, lacks appropriate home exercise program and pain with function  Understanding of Dx/Px/POC: good   Prognosis: good    Goals  STGs to be achieved in 4 weeks:  1  Pt to demonstrate reduced subjective pain rating "at worst" by at least 2-3 points from Initial Eval in order to allow for reduced pain with ADLs and improved functional activity tolerance  2  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  3 Pt to demonstrate increased MMT of B shld flex by at least 1/2-1 grade in order to improve safety and stability with ADLs and functional mobility  LTGs to be achieved in 6-8 weeks:  1  Pt will be I with HEP in order to continue to improve quality of life and independence and reduce risk for re-injury  2  Pt to demonstrate return to [facility] chores and self care  without limitations or restrictions  3  Pt to demonstrate improved function as noted by achieving or exceeding predicted score on FOTO outcomes assessment tool         Plan  Patient would benefit from: skilled physical therapy  Other planned modality interventions: Modalities prn for symptom management  Planned therapy interventions: manual therapy, neuromuscular re-education, therapeutic activities, therapeutic exercise, strengthening, stretching and home exercise program  Frequency: 2x week  Duration in weeks: 6  Plan of Care beginning date: 3/6/2023  Plan of Care expiration date: 2023  Treatment plan discussed with: PTA and patient        Subjective Evaluation    History of Present Illness  Mechanism of injury: Pt having flare up of Tietze's syndrome  Pt also has swollen vocal cords  Pt notes she has been active setting up speakers and moving furniture  Feels this has flared up the Tietze's syndrome  Pt is on anti inflammatory meds  Had good relief with last course of PT Pt having diff with swallowing and had swallow study completed a couple of weeks ago  Pt has swelling in sternal area, tightness in her neck , soreness in chest and trouble swallowing  Sx are bilateral           Recurrent probem    Quality of life: fair    Pain  Current pain ratin (in neck, not sharp, more discomfort, squeezing)  At best pain rating: 3  At worst pain ratin  Quality: discomfort and squeezing  Alleviating factors: nothing  Progression: no change    Social Support  Lives with: significant other    Employment status: working ([employer],  at [employer], entertainer)  Hand dominance: right      Diagnostic Tests    FCE comments: Swallow study-  Per pt "epiglottis issue so food at time does not go down right"Treatments  Previous treatment: physical therapy and medication  Current treatment: medication and physical therapy  Patient Goals  Patient goals for therapy: decreased pain, decreased edema, return to work, independence with ADLs/IADLs and return to sport/leisure activities  Patient goal: be able to watch TV overhead w/o gagging sensation        Objective     Concurrent Complaints  Negative for disturbed sleep (very minor)    Postural Observations  Seated posture: fair    Additional Postural Observation Details  Fwd head, rounded shldrs    Tenderness   Cervical Spine   Tenderness in the sternum, left ribs/costal cartilage and right ribs/costal cartilage       Active Range of Motion   Cervical/Thoracic Spine     Normal active range of motion    Strength/Myotome Testing     Left Shoulder     Planes of Motion   Flexion: 4-   Abduction: 4+   External rotation at 0°: 4+   Internal rotation at 0°: 4+     Right Shoulder     Planes of Motion   Flexion: 4-   Abduction: 4+   External rotation at 0°: 4+   Internal rotation at 0°: 4+     Left Elbow   Flexion: 5  Extension: 5    Right Elbow   Flexion: 5  Extension: 5    General Comments:    Upper quarter screen   Shoulder: unremarkable  Elbow: unremarkable  Hand/wrist: unremarkable            Precautions: see PMH, anxiety, costochondritis      Re-eval Date: 4/17/23    Date 3/6/23       Visit Count 1       FOTO Comp 3/6       Pain In See eval       Pain Out See eval             Manuals 3/6                                       Neuro Re-Ed        scap retraction        MTPs/LTPs                                                Ther Ex        shld rolls Rev for HEP       Trunk rot with cane Rev for HEP       Trunk rot with cane behind head Rev for HEP       LTR stretch        Towel between scap and spine, roll back and forth        Bolster under non painful side, sidebend stretch        Doorway stretch Rev for HEP       UBE                 Ther Activity                        Gait Training                        Modalities

## 2023-03-10 ENCOUNTER — OFFICE VISIT (OUTPATIENT)
Dept: PHYSICAL THERAPY | Facility: CLINIC | Age: 53
End: 2023-03-10

## 2023-03-10 DIAGNOSIS — M94.0 TIETZE SYNDROME: Primary | ICD-10-CM

## 2023-03-10 NOTE — PROGRESS NOTES
Daily Note     Today's date: 3/10/2023  Patient name: Roberto Bowman  : 1970  MRN: [id number]  Referring provider: Dang Pino DO  Dx:   Encounter Diagnosis     ICD-10-CM    1  Tietze syndrome  M94 0                      Subjective: Pt states she is having an EGD test next week  Objective: See treatment diary below      Assessment: Tolerated treatment well  Patient exhibited good technique with therapeutic exercises and would benefit from continued PT  Pt verbalized that she had a good workout at completion of tx  No diff performing any ex  Plan: Continue per plan of care        Precautions: see PMH, anxiety, costochondritis      Re-eval Date: 23    Date 3/6/23 3/10      Visit Count 1 2      FOTO Comp 3/6       Pain In See eval       Pain Out See eval             Manuals 3/6 3/10                                      Neuro Re-Ed        scap retraction  With MTPs      MTPs/LTPs  Green 20x ea                                              Ther Ex        shld rolls Rev for HEP 20x   F/R      Trunk rot with cane Rev for HEP 10x B      Trunk rot with cane behind head Rev for HEP 10x B      LTR stretch  10x 10"      Towel between scap and spine, roll back and forth  20x L/R      Bolster under non painful side, sidebend stretch        Doorway stretch  90*, 140*, 45* Rev for HEP 3 x 20"      UBE   90 rpm 10' alt              towelRoll under spine with arms outstreched     5 x 15"      Overhead lat stretch     5 x 10" B      B overhead stretch with wand in supine  10 x 10"              Ther Activity                        Gait Training                        Modalities

## 2023-03-14 ENCOUNTER — OFFICE VISIT (OUTPATIENT)
Dept: OTOLARYNGOLOGY | Facility: CLINIC | Age: 53
End: 2023-03-14

## 2023-03-14 VITALS
HEART RATE: 126 BPM | WEIGHT: 203.4 LBS | DIASTOLIC BLOOD PRESSURE: 80 MMHG | TEMPERATURE: 97.8 F | HEIGHT: 63 IN | SYSTOLIC BLOOD PRESSURE: 158 MMHG | BODY MASS INDEX: 36.04 KG/M2 | OXYGEN SATURATION: 99 %

## 2023-03-14 DIAGNOSIS — Z72.0 TOBACCO USE: Primary | ICD-10-CM

## 2023-03-14 DIAGNOSIS — M94.0 TIETZE SYNDROME: ICD-10-CM

## 2023-03-14 DIAGNOSIS — R49.0 HOARSENESS: ICD-10-CM

## 2023-03-14 NOTE — PROGRESS NOTES
Otolaryngology Clinic Visit  Name:  Vlad Sosa  MRN:  [id number]  Date:  3/14/2023 10:49 AM  ________________________________________________________________________       CHIEF COMPLAINT:   Ear follow up     HPI:  Vlad Sosa is a 46 y o  female with PMH as below who is here for further follow up of ear issues  She was going to have a tympanoplasty on the Right with dr Carlos Andre but decided against it  She reports clogged feeling on the left today  She continues to smoke and is backing off a little bit  Continues to have been smell/phantasmia since her last visit  She is more open to getting an open MRI today  She denies any other changes from last visit  No other ENT questions or concerns today  Interval history  'Since her last visit she reports that she has a flare and has increased swelling  Her voice is weak and hoarse she is having issues with singing  She is working to stop smoking      PMHx:  Past Medical History:   Diagnosis Date   • Acid reflux    • Anxiety    • Anxiety    • Bacterial vaginosis    • Cleft palate    • Costochondritis    • DDD (degenerative disc disease), cervical    • Frequency of urination    • GERD (gastroesophageal reflux disease)    • HPV (human papilloma virus) anogenital infection    • HPV (human papilloma virus) infection    • Hyperhidrosis    • Hyperlipidemia    • Hypertension    • Perforated ear drum    • Raynaud's disease    • Tobacco dependence    • Umbilical hernia    • Vocal cords swelling    • Wears glasses        PSHx:  Past Surgical History:   Procedure Laterality Date   • CERVICAL BIOPSY N/A 9/13/2021    Procedure: CONE BIOPSY OF THE CERVIX;  Surgeon: Cherylene Crater, MD;  Location: [facility] MAIN OR;  Service: Gynecology   • CLEFT PALATE REPAIR     • TYMPANOPLASTY Left    • TYMPANOSTOMY TUBE PLACEMENT         FAMHx:  Family History   Problem Relation Age of Onset   • Lung cancer Mother    • Mental illness Mother    • Heart disease Father    • Hypertension Father    • Uterine cancer Sister    • No Known Problems Maternal Grandmother    • No Known Problems Maternal Grandfather    • No Known Problems Paternal Grandmother    • No Known Problems Paternal Grandfather    • No Known Problems Maternal Aunt    • Cancer Paternal Aunt         unknown origin        SOCHx:  Social History     Socioeconomic History   • Marital status: Single     Spouse name: None   • Number of children: None   • Years of education: None   • Highest education level: None   Occupational History   • Occupation:     Tobacco Use   • Smoking status: Every Day     Packs/day: 1 00     Years: 37 00     Pack years: 37 00     Types: Cigarettes     Start date: 1985   • Smokeless tobacco: Never   • Tobacco comments:     04/18/2022-- max 1 pack per day   Vaping Use   • Vaping Use: Never used   Substance and Sexual Activity   • Alcohol use: Not Currently   • Drug use: Never   • Sexual activity: Yes   Other Topics Concern   • None   Social History Narrative    Smoke: 1 pack daily x 25 yrs - As per paper chart      Social Determinants of Health     Financial Resource Strain: Not on file   Food Insecurity: Not on file   Transportation Needs: Not on file   Physical Activity: Not on file   Stress: Not on file   Social Connections: Not on file   Intimate Partner Violence: Not on file   Housing Stability: Not on file       Allergies:   Allergies   Allergen Reactions   • Flexeril [Cyclobenzaprine] Other (See Comments)     Patient reported "Dry Mouth"   • Medical Tape Hives        MEDS:  Reviewed    ROS:  As above otherwise:   See ROS in encounter by MA       PHYSICAL EXAM:  /80 (BP Location: Left arm, Cuff Size: Large)   Pulse (!) 126   Temp 97 8 °F (36 6 °C) (Temporal)   Ht 5' 3" (1 6 m)   Wt 92 3 kg (203 lb 6 4 oz)   SpO2 99%   BMI 36 03 kg/m²   General: NAD, AOx4  Eyes:  EOMI  Ears:  Right: ear canal normal, TM large marginal apperance, no fluid  Left: Normal  Nasal: Mild turbinate hypertrophy  Oral cavity:  Unremarkable  Neck: Unremarkable  Lymph:  Unremarkable  Skin:  No obvious facial lesions  Neuro: Face symmetrical, no obvious cranial nerve palsy  No focal deficits   Lungs:  Normal work of breathing, symmetrical chest expansion  Vascular: Well perfused    Procedures:  FIBEROPTIC LARYNGOSCOPY:  Procedure:Fiberoptic nasopharyngolaryngoscopy  Diagnosis: Hoarseness  : Dr Jenni Zurita    The risks, benefits and alternatives were discussed  The patient voiced their understanding  They wished to proceed and an informed consent was obtained  The fiberoptic endoscope was inserted via the left nasal cavity}  Findings listed below:  --Normal appearing nasal cavity, nasopharynx, fossa of Rosenmüller, oropharynx, BOT, epiglottis    - Vocal folds mobile bilaterally  - No Subglottic edema  - No Ventricular obliteration  - Mild arytenoid erythema  - Mild diffuse edema without vocal fold edema  - No posterior commissure hypertrophy  - No granulation  - No thick endolaryngeal mucus      Medical Data Reviewed:  Records reviewed and summarized as in EPIC    Radiology:  None    Labs:  None     Patient Active Problem List   Diagnosis   • Microhematuria   • Phantosmia   • Abnormality of right breast on screening mammogram   • Acid reflux   • Age-related nuclear cataract, bilateral   • Blood in urine   • Claustrophobia   • Cleft palate   • Degeneration of intervertebral disc of cervical region   • Dysphagia   • Deviated septum   • Fibroids   • H/O colposcopy with cervical biopsy   • High blood pressure   • HPV (human papilloma virus) infection   • Ovarian cyst   • Perforation of tympanic membrane   • Presbyopia   • Raynaud's disease   • Tachycardia   • Tietze syndrome   • Umbilical hernia   • Vocal cords swelling   • Vocal cord contact ulcer   • Degenerative cervical disc   • Tobacco use   • Screening for colon cancer   • Cervical stenosis of spine   • Spondylosis of cervical region without myelopathy or radiculopathy   • Sebaceous cyst of right axilla   • Mild cervical dysplasia   • Infected epidermoid cyst       ASSESSMENT/PLAN:  Jia Ocampo is a 46 y o  female with acute and chronic problems as above who presents with:    1  Tobacco use    2  Hoarseness    3  Tietze syndrome      46 y o  here today for further follow up  With voice issues x 2 months  Exam sounds good with a reassuring scope  Counseled on tobacco cessation  Spoke about voice use and misuse and to avoid whispering  Will send to speech   She will RTC as needed       Tonny Deng MD MPH  Otolaryngology--Head and Neck Surgery  [facility]  3/14/2023 10:49 AM

## 2023-03-14 NOTE — PROGRESS NOTES
Review of Systems   Constitutional: Negative  HENT: Positive for sore throat and voice change  Eyes: Negative  Respiratory: Negative  Cardiovascular: Negative  Gastrointestinal: Negative  Endocrine: Negative  Genitourinary: Negative  Musculoskeletal: Negative  Skin: Negative  Allergic/Immunologic: Negative  Neurological: Positive for headaches  Hematological: Negative  Psychiatric/Behavioral: Negative

## 2023-03-15 ENCOUNTER — OFFICE VISIT (OUTPATIENT)
Dept: PHYSICAL THERAPY | Facility: CLINIC | Age: 53
End: 2023-03-15

## 2023-03-15 DIAGNOSIS — M94.0 TIETZE SYNDROME: Primary | ICD-10-CM

## 2023-03-15 NOTE — PROGRESS NOTES
Daily Note     Today's date: 3/15/2023  Patient name: Nohemi Bass  : 1970  MRN: [id number]  Referring provider: Enid Mercado DO  Dx:   Encounter Diagnosis     ICD-10-CM    1  Tietze syndrome  M94 0                      Subjective: Pt states she is feeling a little better  Had an EGD yesterday and is relieved there are no tumors or paralysis of vocal cords  Objective: See treatment diary below      Assessment: Tolerated treatment well  Patient exhibited good technique with therapeutic exercises and would benefit from continued PT      Plan: Continue per plan of care        Precautions: see PMH, anxiety, costochondritis      Re-eval Date: 23    Date 3/6/23 3/10  3/15     Visit Count 1 2 3     FOTO Comp 3/6       Pain In See eval       Pain Out See eval             Manuals 3/6 3/10 3/15                                     Neuro Re-Ed        scap retraction  With MTPs      MTPs/LTPs  Green 20x ea Green 20x ea                                             Ther Ex        shld rolls Rev for HEP 20x   F/R 20x F/R     Trunk rot with cane Rev for HEP 10x B 15x B     Trunk rot with cane behind head Rev for HEP 10x B 15x B     LTR stretch  10x 10"    3 x 20" ea     Towel between scap and spine, roll back and forth  20x L/R 20x L/R     Bolster under non painful side, sidebend stretch        Doorway stretch  90*, 140*, 45* Rev for HEP 3 x 20"  3 x 20" ea     UBE   90 rpm 10' alt 90 rpm 10' alt     LTRs   5x5"       towelRoll under spine with arms outstreched     5 x 15"   5 x 15"     Overhead lat stretch     5 x 10" B   5 x 10"     B overhead stretch with wand in supine  10 x 10"   10 x 10"             Ther Activity                        Gait Training                        Modalities

## 2023-03-16 ENCOUNTER — OFFICE VISIT (OUTPATIENT)
Dept: PHYSICAL THERAPY | Facility: CLINIC | Age: 53
End: 2023-03-16

## 2023-03-16 DIAGNOSIS — M94.0 TIETZE SYNDROME: Primary | ICD-10-CM

## 2023-03-16 NOTE — PROGRESS NOTES
Daily Note     Today's date: 3/16/2023  Patient name: Sylvia Arredondo  : 1970  MRN: [id number]  Referring provider: Gracie Cook DO  Dx:   Encounter Diagnosis     ICD-10-CM    1  Tietze syndrome  M94 0                      Subjective: Pt states she seems to be improving quickly and believes a lot of her problems are related to the swollen vocal cords  Objective: See treatment diary below      Assessment: Tolerated treatment well  Patient exhibited good technique with therapeutic exercises and would benefit from continued PT      Plan: Continue per plan of care        Precautions: see PMH, anxiety, costochondritis      Re-eval Date: 23    Date 3/6/23 3/10  3/15     Visit Count 1 2 3     FOTO Comp 3/6       Pain In See eval       Pain Out See eval             Manuals 3/6 3/10 3/15                                     Neuro Re-Ed        scap retraction  With MTPs      MTPs/LTPs  Green 20x ea Green 20x ea gheen  20x                                            Ther Ex        shld rolls Rev for HEP 20x   F/R 20x F/R 20x F/R    Trunk rot with cane Rev for HEP 10x B 15x B 20xB    Trunk rot with cane behind head Rev for HEP 10x B 15x B 20x B    LTR stretch  10x 10"    3 x 20" ea 3 x 20" ea    Towel between scap and spine, roll back and forth  20x L/R 20x L/R 20x  L/R    Bolster under non painful side, sidebend stretch        Doorway stretch  90*, 140*, 45* Rev for HEP 3 x 20"  3 x 20" ea 3 x 20" ea    UBE   90 rpm 10' alt 90 rpm 10' alt 80 rpm    10' alt    LTRs   5x5"   10 x 5"    towelRoll under spine with arms outstreched     5 x 15"   5 x 15"    5 x 15"    Overhead lat stretch     5 x 10" B   5 x 10" 5 x 10"    B overhead stretch with wand in supine  10 x 10"   10 x 10" 15 x 10"            Ther Activity                        Gait Training                        Modalities

## 2023-03-20 ENCOUNTER — OFFICE VISIT (OUTPATIENT)
Dept: PHYSICAL THERAPY | Facility: CLINIC | Age: 53
End: 2023-03-20

## 2023-03-20 DIAGNOSIS — M94.0 TIETZE SYNDROME: Primary | ICD-10-CM

## 2023-03-20 NOTE — PROGRESS NOTES
Daily Note     Today's date: 3/20/2023  Patient name: Brianna Bryant  : 1970  MRN: [id number]  Referring provider: Barbara Petersen DO  Dx:   Encounter Diagnosis     ICD-10-CM    1  Tietze syndrome  M94 0                      Subjective: The patient states that she has been noticing a difference with therapy  Melvin good up until Saturday night when she feels like she "took two steps back "        Objective: See treatment diary below      Assessment: Tolerated treatment well with no increase in pain noted during session today  Some verbal cues needed for correct form with completing TE  She would benefit from program of progressive stretching and strengthening to help improve functio  Patient would benefit from continued PT  Plan: Continue per plan of care  Progress as able in upcoming visits         Precautions: see PMH, anxiety, costochondritis      Re-eval Date: 23    Date 3/6/23 3/10  3/15 3/16 3/20   Visit Count 1 2 3 4 5   FOTO Comp 3/6       Pain In See eval       Pain Out See eval             Manuals 3/6 3/10 3/15 3/16 3/20                                   Neuro Re-Ed        scap retraction  With MTPs      MTPs/LTPs  Green 20x ea Green 20x ea green  20x Green 20x ea                                           Ther Ex        shld rolls Rev for HEP 20x   F/R 20x F/R 20x F/R 20x F/R   Trunk rot with cane Rev for HEP 10x B 15x B 20xB 20x B   Trunk rot with cane behind head Rev for HEP 10x B 15x B 20x B 20x B   LTR stretch  10x 10"    3 x 20" ea 3 x 20" ea 20"x3 ea   Towel between scap and spine, roll back and forth  20x L/R 20x L/R 20x  L/R 20x L/R   Bolster under non painful side, sidebend stretch        Doorway stretch  90*, 140*, 45* Rev for HEP 3 x 20"  3 x 20" ea 3 x 20" ea 20"x3 ea   UBE   90 rpm 10' alt 90 rpm 10' alt 80 rpm    10' alt 80 rpm    10' Alt   LTRs   5x5"   10 x 5" 5-10"x10   Towel roll under spine with arms outstreched     5 x 15"   5 x 15"    5 x 15" 15"x5   Overhead lat stretch     5 x 10" B   5 x 10" 5 x 10" 10"x5   B overhead stretch with wand in supine  10 x 10"   10 x 10" 15 x 10" 10"x15           Ther Activity                        Gait Training                        Modalities

## 2023-03-24 ENCOUNTER — OFFICE VISIT (OUTPATIENT)
Dept: PHYSICAL THERAPY | Facility: CLINIC | Age: 53
End: 2023-03-24

## 2023-03-24 DIAGNOSIS — M94.0 TIETZE SYNDROME: Primary | ICD-10-CM

## 2023-03-24 NOTE — PROGRESS NOTES
Daily Note     Today's date: 3/24/2023  Patient name: Crista Toribio  : 1970  MRN: [id number]  Referring provider: Rebel Diego DO  Dx:   Encounter Diagnosis     ICD-10-CM    1  Tietze syndrome  M94 0                      Subjective: Pt states her neck is feeling much better, less stress on neck  Objective: See treatment diary below      Assessment: Tolerated treatment well  Patient demonstrated fatigue post treatment, exhibited good technique with therapeutic exercises and would benefit from continued PT      Plan: Continue per plan of care        Precautions: see PMH, anxiety, costochondritis      Re-eval Date: 23    Date 3/24/23 3/10  3/15 3/16 3/20   Visit Count 6 2 3 4 5   FOTO Comp 3/24       Pain In        Pain Out              Manuals 3/24 3/10 3/15 3/16 3/20                                   Neuro Re-Ed        scap retraction  With MTPs      MTPs/LTPs Green  30x ea Green 20x ea Green 20x ea green  20x Green 20x ea                                           Ther Ex        shld rolls Rev for HEP 20x   F/R 20x F/R 20x F/R 20x F/R   Trunk rot with cane 25x B 10x B 15x B 20xB 20x B   Trunk rot with cane behind head 25x B 10x B 15x B 20x B 20x B   LTR stretch 20 x 3" 10x 10"    3 x 20" ea 3 x 20" ea 20"x3 ea   Towel between scap and spine, roll back and forth 20x L/R 20x L/R 20x L/R 20x  L/R 20x L/R   Bolster under non painful side, sidebend stretch        Doorway stretch  90*, 140*, 45* 3 x 20" 3 x 20"  3 x 20" ea 3 x 20" ea 20"x3 ea   UBE   90 rpm 10' alt 90 rpm 10' alt 80 rpm    10' alt 80 rpm    10' Alt   LTRs 10 x 5"  5x5"   10 x 5" 5-10"x10   Towel roll under spine with arms outstreched 5 x 15"    5 x 15"   5 x 15"    5 x 15" 15"x5   Overhead lat stretch 5 x 10"    5 x 10" B   5 x 10" 5 x 10" 10"x5   B overhead stretch with wand in supine 15 x 10' 10 x 10"   10 x 10" 15 x 10" 10"x15           Ther Activity                        Gait Training                        Modalities

## 2023-03-28 ENCOUNTER — EVALUATION (OUTPATIENT)
Dept: SPEECH THERAPY | Facility: CLINIC | Age: 53
End: 2023-03-28

## 2023-03-28 ENCOUNTER — OFFICE VISIT (OUTPATIENT)
Dept: PHYSICAL THERAPY | Facility: CLINIC | Age: 53
End: 2023-03-28

## 2023-03-28 DIAGNOSIS — R13.10 DYSPHAGIA, UNSPECIFIED TYPE: ICD-10-CM

## 2023-03-28 DIAGNOSIS — Z72.0 TOBACCO USE: ICD-10-CM

## 2023-03-28 DIAGNOSIS — J38.3: ICD-10-CM

## 2023-03-28 DIAGNOSIS — J38.3 VOCAL CORDS SWELLING: ICD-10-CM

## 2023-03-28 DIAGNOSIS — K21.9 GASTROESOPHAGEAL REFLUX DISEASE WITHOUT ESOPHAGITIS: ICD-10-CM

## 2023-03-28 DIAGNOSIS — R49.9 UNSPECIFIED VOICE AND RESONANCE DISORDER: Primary | ICD-10-CM

## 2023-03-28 DIAGNOSIS — M94.0 TIETZE SYNDROME: Primary | ICD-10-CM
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
## 2023-03-28 NOTE — PROGRESS NOTES
"Daily Note     Today's date: 3/28/2023  Patient name: Angela Rodriguez  : 1970  MRN: [id number]  Referring provider: Yandel Ridley DO  Dx:   Encounter Diagnosis     ICD-10-CM    1  Tietze syndrome  M94 0                      Subjective:  Pt denies any pain or sx @ this present time  Objective: See treatment diary below      Assessment: Tolerated treatment Fairly Well overall with performance of ther exer and self stretching  Pt  Progressing consistently since [facility], as per her feedback  Plan:  Con't services 2x/week as per POC/Goals                Precautions: see PMH, anxiety, costochondritis      Re-eval Date: 23    Date 3/24/23 3 28 23  3/15 3/16 3/20   Visit Count 6 7 3 4 5   FOTO Comp 3/24       Pain In  0/10      Pain Out  0/10            Manuals 3/24 3 28 23 3/15 3/16 3/20                                   Neuro Re-Ed  3 28 23      scap retraction        MTPs/LTPs Green  30x ea Green 3x10 ea Green 20x ea green  20x Green 20x ea                                           Ther Ex  3 28 23      shld rolls Rev for HEP *HEP   20x F/R 20x F/R 20x F/R   Trunk rot with cane 25x B 25x B 15x B 20xB 20x B   Trunk rot with cane behind head 25x B 25x B 15x B 20x B 20x B   LTR stretch 20 x 3\" 25x 3\"    3 x 20\" ea 3 x 20\" ea 20\"x3 ea   Towel between scap and spine, roll back and forth 20x L/R 25x L/R 20x L/R 20x  L/R 20x L/R   Bolster under non painful side, sidebend stretch        Doorway stretch  90*, 140*, 45* 3 x 20\" 4 x 20\"  3 x 20\" ea 3 x 20\" ea 20\"x3 ea   UBE   QS 10 min   alt every 2 min 90 rpm 10' alt 80 rpm    10' alt 80 rpm    10' Alt   LTRs 10 x 5\" 15 x 5\" 5x5\"   10 x 5\" 5-10\"x10   Towel roll under spine with arms outstreched 5 x 15\"    6 x 15\"   5 x 15\"    5 x 15\" 15\"x5   Overhead lat stretch 5 x 10\"    6 x 10\" B   5 x 10\" 5 x 10\" 10\"x5   B overhead stretch with wand in supine 15 x 10' 15 x 10\"   10 x 10\" 15 x 10\" 10\"x15           Ther Activity                        Gait Training      " Modalities

## 2023-03-30 ENCOUNTER — OFFICE VISIT (OUTPATIENT)
Dept: PHYSICAL THERAPY | Facility: CLINIC | Age: 53
End: 2023-03-30

## 2023-03-30 DIAGNOSIS — M94.0 TIETZE SYNDROME: Primary | ICD-10-CM

## 2023-03-30 NOTE — PROGRESS NOTES
"Daily Note     Today's date: 3/30/2023  Patient name: Paul Monday  : 1970  MRN: [id number]  Referring provider: Anette Howe DO  Dx:   Encounter Diagnosis     ICD-10-CM    1  Tietze syndrome  M94 0                      Subjective:  Pt  States she was resting all day yesterday  Says she was feeling a lot of sx @ neck and chest regions  Objective: See treatment diary below      Assessment: Tolerated treatment Fairly Well with ther exer and NM Re-Ed  Plan:  Con't services 2x/week                Precautions: see PMH, anxiety, costochondritis      Re-eval Date: 23    Date 3/24/23 3 28 23  3 30 23 3/16 3/20   Visit Count 6 7 8 4 5   FOTO Comp 3/24       Pain In  0/10 \"tight\" in neck; \"pressure\" in chest     Pain Out  0/10 A bit less sx           Manuals 3/24 3 28 23 3 30 23 3/16 3/20                                   Neuro Re-Ed  3 28 23 3 30 23     scap retraction        MTPs/LTPs Green  30x ea Green 3x10 ea Green 30x/pause ea green  20x Green 20x ea                                           Ther Ex  3 28 23 3 30 23     shld rolls Rev for HEP *HEP    20x F/R 20x F/R   Trunk rot with cane 25x B 25x B 30x B 20xB 20x B   Trunk rot with cane behind head 25x B 25x B 30x B 20x B 20x B   LTR stretch 20 x 3\" 25x 3\"  25x 3\"   3 x 20\" ea 20\"x3 ea   Towel between scap and spine, roll back and forth 20x L/R 25x L/R 25x L/R 20x  L/R 20x L/R   Bolster under non painful side, sidebend stretch        Doorway stretch  90*, 140*, 45* 3 x 20\" 4 x 20\"  4 x 20\" ea 3 x 20\" ea 20\"x3 ea   UBE   QS 10 min   alt every 2 min 100 RPM   10 min   alt every 2 min 80 rpm    10' alt 80 rpm    10' Alt   LTRs 10 x 5\" 15 x 5\" 15x5\"   10 x 5\" 5-10\"x10   Towel roll under spine with arms outstreched 5 x 15\"    6 x 15\"   6x 20\"    5 x 15\" 15\"x5   Overhead lat stretch 5 x 10\"    6 x 10\" B   6 x 10\" 5 x 10\" 10\"x5   B overhead stretch with wand in supine 15 x 10' 15 x 10\"   10 x/pause  *Stand 15 x 10\" 10\"x15           Ther Activity " Gait Training                        Modalities

## 2023-04-04 ENCOUNTER — OFFICE VISIT (OUTPATIENT)
Dept: PHYSICAL THERAPY | Facility: CLINIC | Age: 53
End: 2023-04-04

## 2023-04-04 ENCOUNTER — OFFICE VISIT (OUTPATIENT)
Dept: SPEECH THERAPY | Facility: CLINIC | Age: 53
End: 2023-04-04

## 2023-04-04 DIAGNOSIS — Z72.0 TOBACCO USE: ICD-10-CM

## 2023-04-04 DIAGNOSIS — J38.3 VOCAL CORDS SWELLING: ICD-10-CM

## 2023-04-04 DIAGNOSIS — R13.10 DYSPHAGIA, UNSPECIFIED TYPE: ICD-10-CM

## 2023-04-04 DIAGNOSIS — R49.9 UNSPECIFIED VOICE AND RESONANCE DISORDER: Primary | ICD-10-CM

## 2023-04-04 DIAGNOSIS — J38.3: ICD-10-CM

## 2023-04-04 DIAGNOSIS — K21.9 GASTROESOPHAGEAL REFLUX DISEASE WITHOUT ESOPHAGITIS: ICD-10-CM

## 2023-04-04 DIAGNOSIS — M94.0 TIETZE SYNDROME: Primary | ICD-10-CM

## 2023-04-04 NOTE — PROGRESS NOTES
"Daily Note     Today's date: 2023  Patient name: Florina Nagel  : 1970  MRN: [id number]  Referring provider: Tess Helms DO  Dx:   Encounter Diagnosis     ICD-10-CM    1  Tietze syndrome  M94 0                      Subjective:  Pt  states she had a lot of sx with driving here this morning  Says her B shoulders were bothersome, and she had a tight feeling in neck and chest regions, as well  She also notes she feels some of the therapy may be too much, and may be exasperating her sx  Objective: See treatment diary below      Assessment: Tolerated treatment Fair+/Fairly Well range (but better) with ther exer/ther acts performed  Select exercises adjusted today, with pt  tolerating \"Better\"  Plan: Con't services 2x/week                Precautions: see PMH, anxiety, costochondritis      Re-eval Date: 23    Date 3/24/23 3 28 23  3 30 23 4 4 23 3/20   Visit Count 6 7 8 9 5   FOTO Comp 3/24       Pain In  0/10 \"tight\" in neck; \"pressure\" in chest B shld discomfort    Neck/chest tight    Pain Out  0/10 A bit less sx A bit better           Manuals 3/24 3 28 23 3 30 23 4 4 23 3/20                                   Neuro Re-Ed  3 28 23 3 30 23 4 4 23    scap retraction        MTPs/LTPs Green  30x ea Green 3x10 ea Green 30x/pause ea *RED 3x10 Green 20x ea                                           Ther Ex  3 28 23 3 30 23 4 4 23    shld rolls Rev for HEP *HEP     20x F/R   Trunk rot with cane 25x B 25x B 30x B 30xB 20x B   Trunk rot with cane behind head 25x B 25x B 30x B 30x B  *Behind LB 20x B   LTR stretch 20 x 3\" 25x 3\"  25x 3\"   25x 3\" 20\"x3 ea   Towel between scap and spine, roll back and forth 20x L/R 25x L/R 20x L/R 20x  L/R 20x L/R   Bolster under non painful side, sidebend stretch        Doorway stretch  90*, 140*, 45* 3 x 20\" 4 x 20\"  4 x 20\" ea 4 x 20\" ea 20\"x3 ea   UBE   QS 10 min   alt every 2 min 100 RPM   10 min   alt every 2 min 110 rpm    10' alt every 2 min 80 rpm    10' Alt " "  LTRs 10 x 5\" 15 x 5\" 15x5\"   20 x 5\" 5-10\"x10   Towel roll under spine with arms outstreched 5 x 15\"    6 x 15\"   6x 20\"    6x 20\"  *to tolerable range 15\"x5   Overhead lat stretch 5 x 10\"    6 x 10\" B   6 x 10\"   6 x 10\"  *to tolerable range 10\"x5   B overhead stretch with wand in supine 15 x 10' 15 x 10\"  *hold 10\"x15           Ther Activity                        Gait Training                        Modalities                             "

## 2023-04-04 NOTE — PROGRESS NOTES
Daily Speech Treatment Note    Today's date: 2023  Patient’s name: Chhaya Salmeron  : 1970  MRN: [id number]  Safety measures:       Encounter Diagnosis     ICD-10-CM    1  Unspecified voice and resonance disorder  R49 9       2  Vocal cords swelling  J38 3       3  Gastroesophageal reflux disease without esophagitis  K21 9       4  Vocal cord contact ulcer  J38 3       5  Dysphagia, unspecified type  R13 10       6  Tobacco use  Z72 0         Visit tracking:  -Referring provider: Epic  -Billing guidelines: AMA  -Visit #  -Insurance: [employer]  -RE due 23    Subjective/Behavioral: Patient arrived on time to session  Objective:  Short-term goals:  1  Patient will be educated on vocal hygiene and demonstrate understanding of recommendations to facilitate increased quality of voice, to be achieved in 4-6 weeks  3/28 Provided education on avoiding minty and mentholated cough drops  2  Patient will increase MPT to >10-15 seconds, to be achieved in 4-6 weeks  3  Patient will be educated on diaphragmatic breathing (versus clavicular breathing) to establish controlled, rhythmic breathing, to be achieved in 4-6 weeks   Educated patient on diaphragmatic breathing, trialed in therapy  Patient able to feel biofeedback of movement of diaphragm  Patient also trialed ratio breathing - able to reach 6:12 seconds, advised to practice at this ratio at home  4  Patient will be educated on the use and implementation of Resonant Voice, to be achieved in 4-6 weeks  5  Patient will complete vocal function exercises to increase vocal fold efficiency and endurance, to be achieved in 4-6 weeks   Patient completed VF exercises today - some difficulty noted with range of glides  6  Patient will demonstrate the use of relaxation exercises to decrease upper body tension contributing to dysphonia, to be achieved in 4-6 weeks      7  Patient will be educated on and trial vocal warm-ups, to be achieved in 4-6 weeks  Long-term goals:  1  Patient will improve vocal quality for increased functional communication by discharge  2  Patient will show improvements on self-ratings, perceptual, and objective measures of voice by discharge  Assessment: Patient noting hesitation to participate in voice therapy due to difficulty breathing secondary to Tietze/costo symptoms  Additionally, let patient know about SLP in [place] specializing in singers - will reach out and follow up with patient next session  Plan:  -Continue with current plan of care

## 2023-04-06 ENCOUNTER — APPOINTMENT (OUTPATIENT)
Dept: PHYSICAL THERAPY | Facility: CLINIC | Age: 53
End: 2023-04-06

## 2023-04-11 ENCOUNTER — APPOINTMENT (OUTPATIENT)
Dept: SPEECH THERAPY | Facility: CLINIC | Age: 53
End: 2023-04-11

## 2023-04-11 ENCOUNTER — APPOINTMENT (OUTPATIENT)
Dept: PHYSICAL THERAPY | Facility: CLINIC | Age: 53
End: 2023-04-11

## 2023-04-12 ENCOUNTER — APPOINTMENT (OUTPATIENT)
Dept: PHYSICAL THERAPY | Facility: CLINIC | Age: 53
End: 2023-04-12

## 2023-04-18 ENCOUNTER — APPOINTMENT (OUTPATIENT)
Dept: PHYSICAL THERAPY | Facility: CLINIC | Age: 53
End: 2023-04-18

## 2023-04-24 ENCOUNTER — TELEPHONE (OUTPATIENT)
Dept: PAIN MEDICINE | Facility: MEDICAL CENTER | Age: 53
End: 2023-04-24

## 2023-04-24 ENCOUNTER — CONSULT (OUTPATIENT)
Dept: PAIN MEDICINE | Facility: CLINIC | Age: 53
End: 2023-04-24

## 2023-04-24 VITALS
HEIGHT: 63 IN | DIASTOLIC BLOOD PRESSURE: 80 MMHG | SYSTOLIC BLOOD PRESSURE: 122 MMHG | HEART RATE: 103 BPM | OXYGEN SATURATION: 99 % | WEIGHT: 197 LBS | BODY MASS INDEX: 34.91 KG/M2

## 2023-04-24 DIAGNOSIS — R51.9 FACIAL PAIN, ACUTE: ICD-10-CM

## 2023-04-24 DIAGNOSIS — M54.2 NECK PAIN: ICD-10-CM

## 2023-04-24 DIAGNOSIS — R20.2 PARESTHESIAS: Primary | ICD-10-CM

## 2023-04-24 DIAGNOSIS — M47.812 DJD (DEGENERATIVE JOINT DISEASE) OF CERVICAL SPINE: ICD-10-CM

## 2023-04-24 NOTE — TELEPHONE ENCOUNTER
Pt called stating that [facility] needs a prior auth on her MRI       Tax ID [id number]  Pt is not sure what this number is but it was given to her by [facility]  [phone]    Pt is not able to schedule Mri until this is completed

## 2023-04-24 NOTE — PROGRESS NOTES
"Assessment:  1  Paresthesias    2  Neck pain    3  DJD (degenerative joint disease) of cervical spine    4  Facial pain, acute      Portions of the record may have been created with voice recognition software  Occasional wrong word or \"sound a like\" substitutions may have occurred due to the inherent limitations of voice recognition software  Read the chart carefully and recognize, using context, where substitutions have occurred  Contact me with any questions  Plan:  28-year-old female with history of HTN, anxiety, HLD, GERD, phantosmia, Tietze syndrome, referred by Loulou Pineda PA-C, here for initial evaluation of ~ 3 weeks of diffuse, global symptoms including paresthesias in all 4 extremities, subjective weakness in all extremities, facial pain including jaw pain, headache of a few weeks duration which started during a physical therapy session for her Tietze syndrome  She was prescribed steroid Dosepak by PCP without improvement in symptoms  She has also been taking her prescribed Valium and Robaxin without relief  She presented to the ER twice for these symptoms and a CTA head and neck was obtained which did not show significant intracranial findings, showed moderate to advanced multilevel cervical spondylosis  CT of cervical spine showed multilevel mild to moderate degenerative changes with varying levels of foraminal and central canal narrowing  Patient notes she has chronic neck pain but notes her her other symptoms, especially facial pain is most bothersome at this time  Patient's diffuse symptoms including facial pain not explained by changes on CT of cervical spine  1   Recommend MRI brain and cervical spine with/without contrast for further evaluation  2   Referral to neurology provided for further evaluation  History of Present Illness:     The patient is a 48 y o  female who presents for consultation in regards to Headache, Jaw Pain, Neck Pain, and Arm Pain, diffuse " paresthesias and subjective weakness  Symptoms have been present since PT session on 4/4/23  Symptoms began while undergoing PT for Tietze syndrome  Pain is reported to be 9/10 on the numeric rating scale  Symptoms are felt constantly and worst in the no typical pattern  Symptoms are characterized as numbing and tingling  Symptoms are associated with bilateral arm weakness and bilateral leg weakness  She denies saddle anesthesia, bowel/bladder incontinence  Aggravating factors include movement, chewing  Relieving factors include nothing  Treatments that have been helpful include nothing  Patient is very anxious during this visit  Review of Systems:    Review of Systems   Constitutional: Negative for chills, fatigue and fever  HENT: Positive for hearing loss (perforation)  Negative for sinus pain, sore throat and trouble swallowing  Eyes: Negative for pain and visual disturbance  Respiratory: Negative for shortness of breath and wheezing  Cardiovascular: Positive for chest pain (costo chondritis)  Negative for palpitations  Gastrointestinal: Negative for abdominal pain, constipation and nausea  Endocrine: Positive for polyuria  Negative for polydipsia  Genitourinary: Positive for dysuria (trace amounts)  Negative for difficulty urinating  Musculoskeletal: Positive for joint swelling and myalgias  Negative for arthralgias and gait problem  Joint pain     Skin: Negative for rash  Neurological: Positive for headaches  Negative for dizziness and weakness  Hematological: Does not bruise/bleed easily  Psychiatric/Behavioral: Negative for dysphoric mood  The patient is nervous/anxious  All other systems reviewed and are negative          Past Medical History:   Diagnosis Date   • Acid reflux    • Anxiety    • Anxiety    • Bacterial vaginosis    • Cleft palate    • Costochondritis    • DDD (degenerative disc disease), cervical    • Frequency of urination    • GERD (gastroesophageal reflux disease)    • HPV (human papilloma virus) anogenital infection    • HPV (human papilloma virus) infection    • Hyperhidrosis    • Hyperlipidemia    • Hypertension    • Perforated ear drum    • Raynaud's disease    • Tobacco dependence    • Umbilical hernia    • Vocal cords swelling    • Wears glasses        Past Surgical History:   Procedure Laterality Date   • CERVICAL BIOPSY N/A 9/13/2021    Procedure: CONE BIOPSY OF THE CERVIX;  Surgeon: Asmita Tiwari MD;  Location: [address] [place], [place] [address] MAIN OR;  Service: Gynecology   • CLEFT PALATE REPAIR     • TYMPANOPLASTY Left    • TYMPANOSTOMY TUBE PLACEMENT         Family History   Problem Relation Age of Onset   • Lung cancer Mother    • Mental illness Mother    • Heart disease Father    • Hypertension Father    • Uterine cancer Sister    • No Known Problems Maternal Grandmother    • No Known Problems Maternal Grandfather    • No Known Problems Paternal Grandmother    • No Known Problems Paternal Grandfather    • No Known Problems Maternal Aunt    • Cancer Paternal Aunt         unknown origin        Social History     Occupational History   • Occupation:     Tobacco Use   • Smoking status: Every Day     Packs/day: 1 00     Years: 37 00     Pack years: 37 00     Types: Cigarettes     Start date: 1985   • Smokeless tobacco: Never   • Tobacco comments:     04/18/2022-- max 1 pack per day   Vaping Use   • Vaping Use: Never used   Substance and Sexual Activity   • Alcohol use: Not Currently   • Drug use: Never   • Sexual activity: Yes         Current Outpatient Medications:   •  amLODIPine (NORVASC) 10 mg tablet, Take 1 tablet (10 mg total) by mouth daily, Disp: 90 tablet, Rfl: 3  •  ascorbic acid (VITAMIN C) 250 mg tablet, Take 500 mg by mouth daily, Disp: , Rfl:   •  atorvastatin (LIPITOR) 10 mg tablet, Take 1 tablet (10 mg total) by mouth daily, Disp: 90 tablet, Rfl: 1  •  celecoxib (CeleBREX) 100 mg capsule, Take 1 capsule (100 mg total) by mouth 2 "(two) times a day, Disp: 180 capsule, Rfl: 3  •  diazepam (VALIUM) 2 mg tablet, Take 1 tablet (2 mg total) by mouth every 6 (six) hours as needed for anxiety, Disp: 30 tablet, Rfl: 0  •  Multiple Vitamin (multivitamin) tablet, Take 1 tablet by mouth daily, Disp: , Rfl:   •  omeprazole (PriLOSEC) 20 mg delayed release capsule, Take 1 capsule by mouth once daily, Disp: 30 capsule, Rfl: 0    Allergies   Allergen Reactions   • Flexeril [Cyclobenzaprine] Other (See Comments)     Patient reported \"Dry Mouth\"   • Medical Tape Hives       Physical Exam:    /80   Pulse 103   Ht 5' 3\" (1 6 m)   Wt 89 4 kg (197 lb)   LMP  (LMP Unknown) Comment: months ago  SpO2 99%   BMI 34 90 kg/m²     Constitutional: normal, well developed, well nourished, alert, in no distress and non-toxic and no overt pain behavior  Eyes: anicteric  HEENT: grossly intact  Neck: supple, symmetric, trachea midline and no masses   Pulmonary:even and unlabored  Cardiovascular:No edema or pitting edema present  Skin:Normal without rashes or lesions and well hydrated  Psychiatric:anxious  Neuro/Musculoskeletal:normal gait, limited neck ROM in all directions, positive L Xavier's, grossly intact strength and sensation to light touch over BUE and BLE    Imaging    CT CERVICAL SPINE - WITHOUT CONTRAST     INDICATION:   neck pain, after manipulation, paresthesias      COMPARISON:  CT neck 5/20/2020; cervical spine plain film 7/21/2020     TECHNIQUE:  CT examination of the cervical spine was performed without intravenous contrast   Contiguous axial images were obtained  Multiplanar 2D reformatted images were created from the source data      Radiation dose length product (DLP) for this visit:  498 4 mGy-cm     This examination, like all CT scans performed in the [place], was performed utilizing techniques to minimize radiation dose exposure, including the use of iterative   reconstruction and automated exposure control        IMAGE " QUALITY:  Diagnostic      FINDINGS:     ALIGNMENT:  There is straightening of normal cervical lordosis  No subluxation or compression deformity      VERTEBRAE:  No fracture      DEGENERATIVE CHANGES:  Moderate degenerative change in the mid cervical spine is again noted      At the C2-3 level, no disc bulge or herniation is identified  The central canal and neural foramina are patent      At the C3-4 level, mild central disc protrusion indents the ventral thecal sac  Central canal and neural foramina are patent      At the C4-5 level, disc space narrowing and mild circumferential disc bulging flattens the ventral thecal sac  Ossification posterior longitudinal ligament is present  Mild central canal narrowing present  Mild right and moderate left neural foraminal   narrowing results from uncovertebral and facet arthrosis      At the C5-6 level, disc space narrowing and mild-moderate posterior spondylitic ridging is present eccentric to the left with impression upon the ventral thecal sac and abutment of the left ventral cord  Ossification of the posterior longitudinal   ligament is noted  Mild central canal narrowing is identified  Uncovertebral and facet arthrosis contribute to moderate right greater than left neural foraminal narrowing      At the C6-7 level, disc space narrowing and mild posterior spondylitic ridging present as well as mild right paracentral disc bulge, flattening the ventral thecal sac with mild central canal narrowing  Moderate right and mild left neural foraminal   narrowing result from uncovertebral and facet arthrosis      At C7-T1 level, no appreciable disc bulge or herniation is noted  The central canal and neural foramina are patent            PREVERTEBRAL AND PARASPINAL SOFT TISSUES: Unremarkable     THORACIC INLET:  Normal      IMPRESSION:     1  No acute cervical spine fracture or traumatic malalignment    2   Multilevel mild-moderate degenerative change in the mid cervical spine characterized by disc space narrowing, spondylitic ridging, ossification of posterior longitudinal ligament, disc bulging, and uncovertebral and facet arthrosis contributing to   varying degrees of mild-moderate central canal and neural foraminal narrowing  See comments above for description of findings at specific cervical levels  CTA NECK AND BRAIN WITH AND WITHOUT CONTRAST     INDICATION: headache, paresthesias increasing tingling in the arms and legs      COMPARISON:   None      TECHNIQUE:  Routine CT imaging of the Brain without contrast   Post contrast imaging was performed after administration of iodinated contrast through the neck and brain  Post contrast axial 0 625 mm images timed to opacify the arterial system        3D rendering was performed on an independent workstation  MIP reconstructions performed  Coronal reconstructions were performed of the noncontrast portion of the brain        Radiation dose length product (DLP) for this visit:  0434 mGy-cm   This examination, like all CT scans performed in the [facility], was performed utilizing techniques to minimize radiation dose exposure, including the use of iterative   reconstruction and automated exposure control         IV Contrast:  85 mL of iohexol (OMNIPAQUE)     IMAGE QUALITY:   Diagnostic     FINDINGS:  NONCONTRAST BRAIN  PARENCHYMA:  No intracranial mass, mass effect or midline shift  No CT signs of acute infarction  No acute parenchymal hemorrhage      VENTRICLES AND EXTRA-AXIAL SPACES:  Normal for the patient's age      VISUALIZED ORBITS: Normal visualized orbits      PARANASAL SINUSES: Normal visualized paranasal sinuses      CERVICAL VASCULATURE  AORTIC ARCH AND GREAT VESSELS:  Mild atherosclerotic disease of the arch, proximal great vessels and visualized subclavian vessels  No significant stenosis      RIGHT VERTEBRAL ARTERY CERVICAL SEGMENT:  Normal origin   The vessel is normal in caliber throughout the neck      LEFT VERTEBRAL ARTERY CERVICAL SEGMENT:  Normal origin  The vessel is normal in caliber throughout the neck      RIGHT EXTRACRANIAL CAROTID SEGMENT:  Mild atherosclerotic disease of the distal common carotid artery and proximal cervical internal carotid artery without significant stenosis compared to the more distal ICA      LEFT EXTRACRANIAL CAROTID SEGMENT:  Mild atherosclerotic disease of the distal common carotid artery and proximal cervical internal carotid artery without significant stenosis compared to the more distal ICA      NASCET criteria was used to determine the degree of internal carotid artery diameter stenosis        INTRACRANIAL VASCULATURE   INTERNAL CAROTID ARTERIES:  Normal enhancement of the intracranial portions of the internal carotid arteries  Normal ophthalmic artery origins  Normal ICA terminus       ANTERIOR CIRCULATION:  Symmetric A1 segments and anterior cerebral arteries with normal enhancement  Normal anterior communicating artery      MIDDLE CEREBRAL ARTERY CIRCULATION:  M1 segment and middle cerebral artery branches demonstrate normal enhancement bilaterally      DISTAL VERTEBRAL ARTERIES:  Normal distal vertebral arteries  Posterior inferior cerebellar artery origins are normal  Normal vertebral basilar junction      BASILAR ARTERY:  Basilar artery is normal in caliber  Normal superior cerebellar arteries      POSTERIOR CEREBRAL ARTERIES: Both posterior cerebral arteries arises from the basilar tip  Both arteries demonstrate normal enhancement  Normal posterior communicating arteries      VENOUS STRUCTURES:  Normal         NON VASCULAR ANATOMY  BONY STRUCTURES:  No acute osseous abnormality  Mild reversal of the cervical lordosis centered at the C5 level    Moderate to advanced spondylotic changes noted with prominent disc osteophyte complexes are C4-5, C5-C6 and C6-C7 most pronounced at C5-C6   acentrically on the left      SOFT TISSUES OF THE NECK: Unremarkable      THORACIC INLET:  Normal         IMPRESSION:        1  No hemodynamically significant stenosis in the major arteries of the neck  2   No intracranial aneurysm or major intracranial arterial stenosis  3   No acute intracranial hemorrhage  4   Moderate to advanced multilevel cervical spondylosis  Further clinical assessment and follow-up recommended      MRI brain w wo contrast    (Results Pending)   MRI cervical spine w wo contrast    (Results Pending)       Orders Placed This Encounter   Procedures   • MRI brain w wo contrast   • MRI cervical spine w wo contrast   • Ambulatory Referral to Neurology

## 2023-04-24 NOTE — TELEPHONE ENCOUNTER
Caller: neel    Doctor: Jhony Escobar    Reason for call: would like an opening MRI ref instead of a close    Call back#:

## 2023-04-25 ENCOUNTER — APPOINTMENT (OUTPATIENT)
Dept: SPEECH THERAPY | Facility: CLINIC | Age: 53
End: 2023-04-25

## 2023-04-26 ENCOUNTER — TELEPHONE (OUTPATIENT)
Dept: FAMILY MEDICINE CLINIC | Facility: CLINIC | Age: 53
End: 2023-04-26

## 2023-04-26 ENCOUNTER — OFFICE VISIT (OUTPATIENT)
Dept: NEUROSURGERY | Facility: CLINIC | Age: 53
End: 2023-04-26

## 2023-04-26 ENCOUNTER — TELEPHONE (OUTPATIENT)
Dept: PAIN MEDICINE | Facility: CLINIC | Age: 53
End: 2023-04-26

## 2023-04-26 VITALS
HEIGHT: 63 IN | WEIGHT: 186.4 LBS | DIASTOLIC BLOOD PRESSURE: 82 MMHG | OXYGEN SATURATION: 97 % | TEMPERATURE: 98.6 F | BODY MASS INDEX: 33.03 KG/M2 | RESPIRATION RATE: 18 BRPM | SYSTOLIC BLOOD PRESSURE: 142 MMHG | HEART RATE: 107 BPM

## 2023-04-26 DIAGNOSIS — M47.812 SPONDYLOSIS OF CERVICAL REGION WITHOUT MYELOPATHY OR RADICULOPATHY: Primary | ICD-10-CM

## 2023-04-26 NOTE — PROGRESS NOTES
Neurosurgery Office Note  Norma Wheatley 48 y o  female MRN: [id number]       Assessment/Plan     Foraminal stenosis of cervical region  Patient seen outpatient office today for further work-up and evaluation of worsening symptoms  · Patient known to our service was last seen in 2021, when she had subjective bilateral arm and hand weakness  MRI was not recommended at that time but she was referred to neurology  · Patient with history of tietze and costochondritis and was doing physical therapy when on 4/4 she lifted her arms above her head with a cane and twisted side to side and developed significant neck tightness as well as numbness and tingling in her hands as well as head heaviness as well as numbness and tingling in her legs  The symptoms have persisted since that time and she has presented to the emergency department twice in regards to the symptoms  · Patient also reports headaches as well as pressure and pain in her jaw  · She also endorses neck tightness feels like a rubber band but does not radiate into her arms  She does endorse whole arm numbness and tingling especially in her hands and fingers as well as weakness  She denies any difficulty with fine motor skills but is dropping objects  · She has seen pain management but no injections  Imaging reviewed with Dr Ramirez:    CT cervical spine wo 4/15/23:No acute cervical spine fracture or traumatic malalignment  Multilevel mild-moderate degenerative change in the mid cervical spine characterized by disc space narrowing, spondylitic ridging, ossification of posterior longitudinal ligament, disc bulging, and uncovertebral and facet arthrosis contributing to   varying degrees of mild-moderate central canal and neural foraminal narrowing  See comments above for description of findings at specific cervical levels      Plan:  · Reviewed imaging with patient extensively and reviewed cervical spine anatomy etc   · Patient requires further work-up for symptoms  She already has MRI brain and cervical spine pending  Also added on MRI thoracic spine to assess for any etiology given lower extremity symptoms  · Patient was referred to neurology, we will try and assist appointment to be moved forward  · Patient will follow-up in the next few weeks once imaging completed or sooner symptoms worsen as joint appointment with [facility]  · Patient made aware to seek care sooner if she develops any new or worsening neurological change or flag signs  · Patient made aware to contact neurosurgery with any further questions or concerns  Diagnoses and all orders for this visit:    Spondylosis of cervical region without myelopathy or radiculopathy  -     MRI thoracic spine without contrast; Future          I have spent a total time of 40 minutes on 04/26/23 in caring for this patient including Diagnostic results, Instructions for management, Patient and family education, Importance of tx compliance, Risk factor reductions, Impressions, Counseling / Coordination of care, Documenting in the medical record, Reviewing / ordering tests, medicine, procedures  , Obtaining or reviewing history   and Communicating with other healthcare professionals   CHIEF COMPLAINT    Chief Complaint   Patient presents with   • Follow-up       HISTORY    History of Present Illness     48y o  year old female with past medical history significant for hypertension, Raynaud's disease, tietza syndrome, cataracts, HPV, tobacco use, cervical stenosis, GERD, and costochondritis  Patient seen in outpatient office for further work-up and evaluation of worsening symptoms  Patient known to our service and was last seen in 2021, when she was having subjective bilateral arm and hand weakness  MRI at that time was not recommended  Patient was to follow-up with neurology      Patient states for her tietze and costochondritis she was doing physical therapy and did 9 sessions most recent session on 4/4 when she lifted her arms above her head with a cane and twisted side-to-side, she had significant neck tightness as well as numbness and tingling in her arms as well as heaviness in her head and weakness in her arms as well as tingling in her legs which have persistent since that time  She presented to the ER twice with similar symptoms  She was referred to pain management who referred her to neurology and ordered MRI cervical spine and brain  She also endorses since her session with physical therapy having a lot of neck pressure and tightness as well as headaches and pressure in her jaw  She states it is hard to chew or brush her teeth she is eating soft foods  She denies any numbness or tingling in her face  Patient also currently complains of 8/10 neck tightness and feels like a rubber band  She denies any radiation of this pain or tightness  She does endorse at times tingling down her bilateral whole arms but mostly in the top of her hands and all fingers as well as arm weakness  She states using her arms or certain positions will worsen the symptoms  She states resting her arms helps  She also endorses numbness and tingling in her feet which is off and on as well  She denies any recent falls or traumas or difficulty with her balance  She denies any difficulty with fine motor skills but does endorse dropping objects  She also endorses some chest pain  She states her arms tired out very quickly  She denies any dizziness, blurry vision, shortness of breath, abdominal pain, nausea, vomiting, diarrhea, no positive bowel or bladder, no new weakness or numbness/tingling  She states she did see pain management but no injections were given  She states she did 9 sessions of physical therapy which made her pain and symptoms worse  HPI    See Discussion    REVIEW OF SYSTEMS    Review of Systems   Constitutional: Negative  HENT: Positive for trouble swallowing (has t5o eaty soft food jaw hurts)  "  Eyes: Negative  Respiratory: Negative  Cardiovascular: Negative  Gastrointestinal: Positive for nausea (at times)  Endocrine: Negative  Genitourinary: Positive for frequency  Musculoskeletal: Positive for gait problem (no recent falls), myalgias (cramps in legs), neck pain (middle of neck does not radiate from side to side can lifet hands above head) and neck stiffness  Skin: Negative  Allergic/Immunologic: Negative  Neurological: Positive for dizziness (rare), weakness ( and arms), numbness (tingling in both arms to both hands all fingers legs and toes both) and headaches (daily occurance  top of head feels a heavy pressure and face)  Hematological: Does not bruise/bleed easily  Psychiatric/Behavioral: Positive for sleep disturbance (interrupted sleep due to pain in jaw and cramps in legs)  ROS reviewed with patient and agree and changes are made as needed    Meds/Allergies     Current Outpatient Medications   Medication Sig Dispense Refill   • amLODIPine (NORVASC) 10 mg tablet Take 1 tablet (10 mg total) by mouth daily 90 tablet 3   • ascorbic acid (VITAMIN C) 250 mg tablet Take 500 mg by mouth daily     • atorvastatin (LIPITOR) 10 mg tablet Take 1 tablet (10 mg total) by mouth daily 90 tablet 1   • celecoxib (CeleBREX) 100 mg capsule Take 1 capsule (100 mg total) by mouth 2 (two) times a day 180 capsule 3   • diazepam (VALIUM) 2 mg tablet Take 1 tablet (2 mg total) by mouth every 6 (six) hours as needed for anxiety 30 tablet 0   • Multiple Vitamin (multivitamin) tablet Take 1 tablet by mouth daily     • omeprazole (PriLOSEC) 20 mg delayed release capsule Take 1 capsule by mouth once daily 30 capsule 0     No current facility-administered medications for this visit         Allergies   Allergen Reactions   • Flexeril [Cyclobenzaprine] Other (See Comments)     Patient reported \"Dry Mouth\"   • Medical Tape Hives       PAST HISTORY    Past Medical History:   Diagnosis Date   • " "Acid reflux    • Anxiety    • Anxiety    • Bacterial vaginosis    • Cleft palate    • Costochondritis    • DDD (degenerative disc disease), cervical    • Frequency of urination    • GERD (gastroesophageal reflux disease)    • HPV (human papilloma virus) anogenital infection    • HPV (human papilloma virus) infection    • Hyperhidrosis    • Hyperlipidemia    • Hypertension    • Perforated ear drum    • Raynaud's disease    • Tobacco dependence    • Umbilical hernia    • Vocal cords swelling    • Wears glasses        Past Surgical History:   Procedure Laterality Date   • CERVICAL BIOPSY N/A 9/13/2021    Procedure: CONE BIOPSY OF THE CERVIX;  Surgeon: Estela Yates MD;  Location: [facility] MAIN OR;  Service: Gynecology   • CLEFT PALATE REPAIR     • TYMPANOPLASTY Left    • TYMPANOSTOMY TUBE PLACEMENT         Social History     Tobacco Use   • Smoking status: Every Day     Packs/day: 1 00     Years: 37 00     Pack years: 37 00     Types: Cigarettes     Start date: 1985   • Smokeless tobacco: Never   • Tobacco comments:     04/18/2022-- max 1 pack per day   Vaping Use   • Vaping Use: Never used   Substance Use Topics   • Alcohol use: Not Currently   • Drug use: Never       Family History   Problem Relation Age of Onset   • Lung cancer Mother    • Mental illness Mother    • Heart disease Father    • Hypertension Father    • Uterine cancer Sister    • No Known Problems Maternal Grandmother    • No Known Problems Maternal Grandfather    • No Known Problems Paternal Grandmother    • No Known Problems Paternal Grandfather    • No Known Problems Maternal Aunt    • Cancer Paternal Aunt         unknown origin          Above history personally reviewed  EXAM    Vitals:Blood pressure 142/82, pulse (!) 107, temperature 98 6 °F (37 °C), resp  rate 18, height 5' 3\" (1 6 m), weight 84 6 kg (186 lb 6 4 oz), SpO2 97 %  ,Body mass index is 33 02 kg/m²  Physical Exam  Vitals reviewed  Constitutional:       General: She is awake   " She is not in acute distress  Appearance: Normal appearance  She is not ill-appearing  HENT:      Head: Normocephalic and atraumatic  Eyes:      Extraocular Movements: Extraocular movements intact and EOM normal       Conjunctiva/sclera: Conjunctivae normal       Pupils: Pupils are equal, round, and reactive to light  Cardiovascular:      Rate and Rhythm: Normal rate  Pulmonary:      Effort: Pulmonary effort is normal  No respiratory distress  Chest:      Chest wall: No tenderness  Abdominal:      General: There is no distension  Palpations: Abdomen is soft  Tenderness: There is no abdominal tenderness  Musculoskeletal:         General: Normal range of motion  Cervical back: Normal range of motion and neck supple  No tenderness  No spinous process tenderness or muscular tenderness  Skin:     General: Skin is warm and dry  Neurological:      Mental Status: She is alert and oriented to person, place, and time  Motor: Motor strength is normal       Coordination: Finger-Nose-Finger Test normal       Gait: Gait is intact  Deep Tendon Reflexes:      Reflex Scores:       Bicep reflexes are 2+ on the right side and 2+ on the left side  Patellar reflexes are 2+ on the right side and 2+ on the left side  Psychiatric:         Attention and Perception: Attention and perception normal          Mood and Affect: Mood and affect normal          Speech: Speech normal          Behavior: Behavior normal  Behavior is cooperative  Thought Content: Thought content normal          Cognition and Memory: Cognition and memory normal          Judgment: Judgment normal          Neurologic Exam     Mental Status   Oriented to person, place, and time  Follows 2 step commands  Attention: normal  Concentration: normal    Speech: speech is normal   Level of consciousness: alert  Knowledge: good  Able to perform simple calculations  Able to name object  Normal comprehension  Cranial Nerves     CN III, IV, VI   Pupils are equal, round, and reactive to light  Extraocular motions are normal    CN III: no CN III palsy  CN VI: no CN VI palsy  Nystagmus: none   Diplopia: none  Conjugate gaze: present    CN V   Facial sensation intact  CN VII   Facial expression full, symmetric  CN VIII   CN VIII normal    Hearing: intact    CN IX, X   CN IX normal      CN XI   CN XI normal      CN XII   CN XII normal      Motor Exam   Muscle bulk: normal  Overall muscle tone: normal  Right arm pronator drift: absent  Left arm pronator drift: absent    Strength   Strength 5/5 throughout  Sensory Exam   Light touch normal    Proprioception normal    JPS and DST intact     Gait, Coordination, and Reflexes     Gait  Gait: normal    Coordination   Finger to nose coordination: normal    Tremor   Resting tremor: absent  Intention tremor: absent  Action tremor: absent    Reflexes   Right biceps: 2+  Left biceps: 2+  Right patellar: 2+  Left patellar: 2+  Right Xavier: absent  Left Xavier: present  Right ankle clonus: absent  Left ankle clonus: absent        MEDICAL DECISION MAKING    Imaging Studies:     CTA head and neck with and without contrast    Result Date: 4/19/2023  Narrative: CTA NECK AND BRAIN WITH AND WITHOUT CONTRAST INDICATION: headache, paresthesias increasing tingling in the arms and legs  COMPARISON:   None  TECHNIQUE:  Routine CT imaging of the Brain without contrast   Post contrast imaging was performed after administration of iodinated contrast through the neck and brain  Post contrast axial 0 625 mm images timed to opacify the arterial system  3D rendering was performed on an independent workstation  MIP reconstructions performed  Coronal reconstructions were performed of the noncontrast portion of the brain  Radiation dose length product (DLP) for this visit:  618 7789 mGy-cm     This examination, like all CT scans performed in the [facility], was performed utilizing techniques to minimize radiation dose exposure, including the use of iterative reconstruction and automated exposure control  IV Contrast:  85 mL of iohexol (OMNIPAQUE)  IMAGE QUALITY:   Diagnostic FINDINGS: NONCONTRAST BRAIN PARENCHYMA:  No intracranial mass, mass effect or midline shift  No CT signs of acute infarction  No acute parenchymal hemorrhage  VENTRICLES AND EXTRA-AXIAL SPACES:  Normal for the patient's age  VISUALIZED ORBITS: Normal visualized orbits  PARANASAL SINUSES: Normal visualized paranasal sinuses  CERVICAL VASCULATURE AORTIC ARCH AND GREAT VESSELS:  Mild atherosclerotic disease of the arch, proximal great vessels and visualized subclavian vessels  No significant stenosis  RIGHT VERTEBRAL ARTERY CERVICAL SEGMENT:  Normal origin  The vessel is normal in caliber throughout the neck  LEFT VERTEBRAL ARTERY CERVICAL SEGMENT:  Normal origin  The vessel is normal in caliber throughout the neck  RIGHT EXTRACRANIAL CAROTID SEGMENT:  Mild atherosclerotic disease of the distal common carotid artery and proximal cervical internal carotid artery without significant stenosis compared to the more distal ICA  LEFT EXTRACRANIAL CAROTID SEGMENT:  Mild atherosclerotic disease of the distal common carotid artery and proximal cervical internal carotid artery without significant stenosis compared to the more distal ICA  NASCET criteria was used to determine the degree of internal carotid artery diameter stenosis  INTRACRANIAL VASCULATURE INTERNAL CAROTID ARTERIES:  Normal enhancement of the intracranial portions of the internal carotid arteries  Normal ophthalmic artery origins  Normal ICA terminus  ANTERIOR CIRCULATION:  Symmetric A1 segments and anterior cerebral arteries with normal enhancement  Normal anterior communicating artery  MIDDLE CEREBRAL ARTERY CIRCULATION:  M1 segment and middle cerebral artery branches demonstrate normal enhancement bilaterally   DISTAL VERTEBRAL ARTERIES:  Normal distal vertebral arteries  Posterior inferior cerebellar artery origins are normal  Normal vertebral basilar junction  BASILAR ARTERY:  Basilar artery is normal in caliber  Normal superior cerebellar arteries  POSTERIOR CEREBRAL ARTERIES: Both posterior cerebral arteries arises from the basilar tip  Both arteries demonstrate normal enhancement  Normal posterior communicating arteries  VENOUS STRUCTURES:  Normal  NON VASCULAR ANATOMY BONY STRUCTURES:  No acute osseous abnormality  Mild reversal of the cervical lordosis centered at the C5 level  Moderate to advanced spondylotic changes noted with prominent disc osteophyte complexes are C4-5, C5-C6 and C6-C7 most pronounced at C5-C6 acentrically on the left  SOFT TISSUES OF THE NECK:  Unremarkable  THORACIC INLET:  Normal      Impression: 1  No hemodynamically significant stenosis in the major arteries of the neck  2   No intracranial aneurysm or major intracranial arterial stenosis  3   No acute intracranial hemorrhage  4   Moderate to advanced multilevel cervical spondylosis  Further clinical assessment and follow-up recommended  Workstation performed: [id number]     XR chest 1 view portable    Result Date: 4/15/2023  Narrative: CHEST INDICATION:   chest wall pain  COMPARISON:  6/17/2022 EXAM PERFORMED/VIEWS:  XR CHEST PORTABLE Single view FINDINGS: Cardiomediastinal silhouette appears unremarkable  The lungs are clear  No pneumothorax or pleural effusion  Osseous structures appear within normal limits for patient age  Impression: No acute cardiopulmonary disease  Findings are stable Workstation performed: [id number]     CT spine cervical without contrast    Result Date: 4/15/2023  Narrative: CT CERVICAL SPINE - WITHOUT CONTRAST INDICATION:   neck pain, after manipulation, paresthesias   COMPARISON:  CT neck 5/20/2020; cervical spine plain film 7/21/2020 TECHNIQUE:  CT examination of the cervical spine was performed without intravenous contrast  Contiguous axial images were obtained  Multiplanar 2D reformatted images were created from the source data  Radiation dose length product (DLP) for this visit:  498 4 mGy-cm   This examination, like all CT scans performed in the [facility], was performed utilizing techniques to minimize radiation dose exposure, including the use of iterative reconstruction and automated exposure control  IMAGE QUALITY:  Diagnostic  FINDINGS: ALIGNMENT:  There is straightening of normal cervical lordosis  No subluxation or compression deformity  VERTEBRAE:  No fracture  DEGENERATIVE CHANGES:  Moderate degenerative change in the mid cervical spine is again noted  At the C2-3 level, no disc bulge or herniation is identified  The central canal and neural foramina are patent  At the C3-4 level, mild central disc protrusion indents the ventral thecal sac  Central canal and neural foramina are patent  At the C4-5 level, disc space narrowing and mild circumferential disc bulging flattens the ventral thecal sac  Ossification posterior longitudinal ligament is present  Mild central canal narrowing present  Mild right and moderate left neural foraminal  narrowing results from uncovertebral and facet arthrosis  At the C5-6 level, disc space narrowing and mild-moderate posterior spondylitic ridging is present eccentric to the left with impression upon the ventral thecal sac and abutment of the left ventral cord  Ossification of the posterior longitudinal ligament is noted  Mild central canal narrowing is identified  Uncovertebral and facet arthrosis contribute to moderate right greater than left neural foraminal narrowing  At the C6-7 level, disc space narrowing and mild posterior spondylitic ridging present as well as mild right paracentral disc bulge, flattening the ventral thecal sac with mild central canal narrowing    Moderate right and mild left neural foraminal narrowing result from uncovertebral and facet arthrosis  At C7-T1 level, no appreciable disc bulge or herniation is noted  The central canal and neural foramina are patent  PREVERTEBRAL AND PARASPINAL SOFT TISSUES: Unremarkable THORACIC INLET:  Normal      Impression: 1  No acute cervical spine fracture or traumatic malalignment  2   Multilevel mild-moderate degenerative change in the mid cervical spine characterized by disc space narrowing, spondylitic ridging, ossification of posterior longitudinal ligament, disc bulging, and uncovertebral and facet arthrosis contributing to varying degrees of mild-moderate central canal and neural foraminal narrowing  See comments above for description of findings at specific cervical levels  Workstation performed: [id number]       I have personally reviewed pertinent reports     and I have personally reviewed pertinent films in PACS

## 2023-04-26 NOTE — ASSESSMENT & PLAN NOTE
Patient seen outpatient office today for further work-up and evaluation of worsening symptoms  · Patient known to our service was last seen in 2021, when she had subjective bilateral arm and hand weakness  MRI was not recommended at that time but she was referred to neurology  · Patient with history of tietze and costochondritis and was doing physical therapy when on 4/4 she lifted her arms above her head with a cane and twisted side to side and developed significant neck tightness as well as numbness and tingling in her hands as well as head heaviness as well as numbness and tingling in her legs  The symptoms have persisted since that time and she has presented to the emergency department twice in regards to the symptoms  · Patient also reports headaches as well as pressure and pain in her jaw  · She also endorses neck tightness feels like a rubber band but does not radiate into her arms  She does endorse whole arm numbness and tingling especially in her hands and fingers as well as weakness  She denies any difficulty with fine motor skills but is dropping objects  · She has seen pain management but no injections  Imaging reviewed with Dr Ramirez:    CT cervical spine wo 4/15/23:No acute cervical spine fracture or traumatic malalignment  Multilevel mild-moderate degenerative change in the mid cervical spine characterized by disc space narrowing, spondylitic ridging, ossification of posterior longitudinal ligament, disc bulging, and uncovertebral and facet arthrosis contributing to   varying degrees of mild-moderate central canal and neural foraminal narrowing  See comments above for description of findings at specific cervical levels  Plan:  · Reviewed imaging with patient extensively and reviewed cervical spine anatomy etc   · Patient requires further work-up for symptoms  She already has MRI brain and cervical spine pending    Also added on MRI thoracic spine to assess for any etiology given lower extremity symptoms  · Patient was referred to neurology, we will try and assist appointment to be moved forward  · Patient will follow-up in the next few weeks once imaging completed or sooner symptoms worsen as joint appointment with [facility]  · Patient made aware to seek care sooner if she develops any new or worsening neurological change or flag signs  · Patient made aware to contact neurosurgery with any further questions or concerns

## 2023-04-26 NOTE — TELEPHONE ENCOUNTER
Caller:  Misa Gill PT    Doctor: Dr Lalit Valero     Reason for call: MRI script     Call back#: [phone]

## 2023-04-26 NOTE — TELEPHONE ENCOUNTER
Patient seen neurosurgery today and made an appt with a neurologist altho they need a referral, her appt is not until 11/10/23, is that maybe why because her old referral was April?

## 2023-04-26 NOTE — TELEPHONE ENCOUNTER
Caller: Khushi from Neurosurgery    Doctor: Jennifer Gagnon    Reason for call: [facility] states that MRI of brain & cervical spine requires prior authorization  Patient was wondering the status since she was seen at their office & they ordered MRI of thoracic for her       Call back#: [phone]

## 2023-04-26 NOTE — TELEPHONE ENCOUNTER
Spoke with Pt  Sent Pt both MRI referrals for the brain & cervical spine through my chart message  Pt stated she is having the MRIs completed outside the  [address]  Advised Pt that the [facility]'s auth team only completes prior auths for in network MRIs  Pt verbalized understanding and was appreciative

## 2023-04-26 NOTE — TELEPHONE ENCOUNTER
Caller: beatrice villarreal    Doctor: Radha Wallace    Reason for call: auth needed for mri's   npi # of facility [id number]    Call back#: [phone] joshua

## 2023-04-27 NOTE — TELEPHONE ENCOUNTER
Patient has a referral from [facility] pain dated 04/24/2023 - neuro will contact us if they need it updated

## 2023-04-27 NOTE — TELEPHONE ENCOUNTER
Caller: loida price     Doctor: Felicia Munoz     Reason for call: auth needed for mri's   npi # of facility [id number]     Call back#: [phone]

## 2023-04-27 NOTE — TELEPHONE ENCOUNTER
Caller patient  Doctor: Jamil Rendon    Reason for call: pt called stating they need a auth for her 2 mri 's  Insurance saidf no Iris Willis was even sent    Call back#: [phone]

## 2023-04-28 NOTE — TELEPHONE ENCOUNTER
Caller:  Yin Gibson PT    Doctor: Dr Alana Quiroga     Reason for call: Pt called in to speak with the      Call back#: [phone]

## 2023-05-02 ENCOUNTER — TELEPHONE (OUTPATIENT)
Dept: PAIN MEDICINE | Facility: CLINIC | Age: 53
End: 2023-05-02

## 2023-05-02 ENCOUNTER — TELEPHONE (OUTPATIENT)
Dept: NEUROSURGERY | Facility: CLINIC | Age: 53
End: 2023-05-02

## 2023-05-02 NOTE — TELEPHONE ENCOUNTER
Hi Dr Harlan Anton,    I received authorization for the MRI Cervical Spine  A peer to peer review is being suggested due to the clinical information that they received is not sufficient enough for the request   Below are the items sent to obtain approval     Your office note  PT notes   ED Reports  CT Scan  CTA  X-Rays    MRI (Magnetic Resonance Imaging) is usually approved for one of the followin  Known or suspected head injury with resulting nerve problems   2  Recent onset (in the last 24 hours) of a headache that the patient says is the worst headache of their life; a sudden explosive headache; a headache with associated nerve problems or a headache with a family history of stroke, abnormally formed blood vessels, or stretched or enlarged blood vessels   3  Ongoing headache or headache returning often over a long period of time  MRI is generally not indicated unless there are new nerve problems or a change in ability to think or act normally  4  Known or suspected tumor based on history or recent nerve problems   5  Known or suspected stroke, mini-stroke or temporary stroke   6  Suspected stretched or enlarged blood vessels or abnormally formed blood vessels with recent nerve problems   7  Known or suspected diseases with inflammation (for example, inflammation of the lining of the brain or infection in the brain)   8  Known or suspected Multiple Sclerosis (MS)  If you would like me to schedule a peer to peer review, please let me know      Thanks,  Rolanda Fitch

## 2023-05-02 NOTE — TELEPHONE ENCOUNTER
MRI T-spine denied  Per medical reviewer -- multiple studies unable to be approved for the same concern  She recommends starting with what has been approved the MRI brain and c-spine, and if additional imaging still needed can re-submit for the t-spine

## 2023-05-02 NOTE — TELEPHONE ENCOUNTER
I called pt to advise mri tspine denied after dianna did peer to peer -she will get the brain and cspine they were approved by another office  Pt thinks she has occipital neuralgia in head and face she has all the symptoms =  thanks also her mri u orderd was denied tspine after dianna did peer to peer - I sent this message to viktoriya in inbox _[place]

## 2023-05-09 ENCOUNTER — TELEPHONE (OUTPATIENT)
Dept: FAMILY MEDICINE CLINIC | Facility: CLINIC | Age: 53
End: 2023-05-09

## 2023-05-15 ENCOUNTER — OFFICE VISIT (OUTPATIENT)
Dept: PAIN MEDICINE | Facility: CLINIC | Age: 53
End: 2023-05-15

## 2023-05-15 VITALS
HEIGHT: 63 IN | SYSTOLIC BLOOD PRESSURE: 154 MMHG | DIASTOLIC BLOOD PRESSURE: 100 MMHG | BODY MASS INDEX: 32.96 KG/M2 | WEIGHT: 186 LBS | HEART RATE: 116 BPM

## 2023-05-15 DIAGNOSIS — R51.9 FACIAL PAIN, ACUTE: ICD-10-CM

## 2023-05-15 DIAGNOSIS — M50.30 DDD (DEGENERATIVE DISC DISEASE), CERVICAL: ICD-10-CM

## 2023-05-15 DIAGNOSIS — G44.52 NEW DAILY PERSISTENT HEADACHE: Primary | ICD-10-CM

## 2023-05-15 NOTE — PROGRESS NOTES
"Assessment:  1  New daily persistent headache    2  Facial pain, acute    3  DDD (degenerative disc disease), cervical        Portions of the record may have been created with voice recognition software  Occasional wrong word or \"sound a like\" substitutions may have occurred due to the inherent limitations of voice recognition software  Read the chart carefully and recognize, using context, where substitutions have occurred  Contact me with any questions  Plan:  43-year-old female here for follow-up  Since last visit, she underwent MRI of brain with and without contrast which did not show significant abnormalities, per radiology report  She also underwent cervical spine MRI, radiology report reviewed: multilevel DDD with varying levels of central and foraminal stenosis, no abnormal enhancement of the cord  Today, patient notes her most significant symptoms continue to be pressure in the head, facial pain including jaw pain causing difficulty with eating and laying down on the side of her face  She denies new or progressive weakness, saddle anesthesia, BBI's, balance/gait issues  Today, she also notes episodic tightening of her chest which she has experienced intermittently in the past due to Tietze syndrome but notes it is worse lately and at times accompanied by a perception of shortness of breath  Since last visit, she has also followed up with neurosurgery who recommended further work-up including a thoracic spine MRI which patient has been unable to undergo due to insurance denial   She was again recommended to consult with neurology  Changes on her cervical spine MRI possibly contributing to BUE paresthesias however they do not explain other diffuse symptoms  1   Patient to also review imaging at upcoming neurosurgery follow-up  2   Encouraged to keep upcoming appointments with neurology and ENT for further evaluation of facial/jaw pain/headaches    3   Encouraged to follow up with PCP regarding " intermittent chest tightness/SOB or seek care at ER if symptoms acutely worsen or if she develops new or worsening neurological changes  History of Present Illness: The patient is a 48 y o  female who presents for a follow up office visit in regards to Headache, Neck Pain, Chest Pain, and Back Pain  Notes symptoms are persistent since last visit  Mostly complains of pressure and had, facial/jaw pain with difficulty chewing  Current pain medications includes: celebrex 100 mg bid  The patient reports that this regimen is providing minimal pain relief  The patient is reporting no side effects from this pain medication regimen  Previously tried and failed steroid Dosepak, and Robaxin (reports it caused lower extremity weakness)  I have personally reviewed and/or updated the patient's past medical history, past surgical history, family history, social history, current medications, allergies, and vital signs today  Review of Systems  Review of Systems   Constitutional: Negative for unexpected weight change  HENT: Negative for hearing loss  Eyes: Negative for visual disturbance  Respiratory: Positive for shortness of breath  Cardiovascular: Positive for chest pain  Negative for leg swelling  Gastrointestinal: Positive for nausea  Negative for constipation  Endocrine: Negative for polyuria  Genitourinary: Negative for difficulty urinating  Musculoskeletal: Positive for arthralgias and myalgias  Negative for gait problem and joint swelling  Decreased range of motion  Joint stiffness  Swelling - ribcage  Pain in extremity- cramps arms & legs   Skin: Negative for rash  Neurological: Negative for weakness and headaches  Psychiatric/Behavioral: Negative for decreased concentration  All other systems reviewed and are negative          Past Medical History:   Diagnosis Date   • Acid reflux    • Anxiety    • Anxiety    • Bacterial vaginosis    • Cleft palate    • Costochondritis • DDD (degenerative disc disease), cervical    • Frequency of urination    • GERD (gastroesophageal reflux disease)    • HPV (human papilloma virus) anogenital infection    • HPV (human papilloma virus) infection    • Hyperhidrosis    • Hyperlipidemia    • Hypertension    • Perforated ear drum    • Raynaud's disease    • Tobacco dependence    • Umbilical hernia    • Vocal cords swelling    • Wears glasses        Past Surgical History:   Procedure Laterality Date   • CERVICAL BIOPSY N/A 9/13/2021    Procedure: CONE BIOPSY OF THE CERVIX;  Surgeon: Pamela Sheppard MD;  Location: [address] [place], [place] [address]o [place] [facility];  Service: Gynecology   • CLEFT PALATE REPAIR     • TYMPANOPLASTY Left    • TYMPANOSTOMY TUBE PLACEMENT         Family History   Problem Relation Age of Onset   • Lung cancer Mother    • Mental illness Mother    • Heart disease Father    • Hypertension Father    • Uterine cancer Sister    • No Known Problems Maternal Grandmother    • No Known Problems Maternal Grandfather    • No Known Problems Paternal Grandmother    • No Known Problems Paternal Grandfather    • No Known Problems Maternal Aunt    • Cancer Paternal Aunt         unknown origin        Social History     Occupational History   • Occupation:     Tobacco Use   • Smoking status: Every Day     Packs/day: 1 00     Years: 37 00     Pack years: 37 00     Types: Cigarettes     Start date: 1985   • Smokeless tobacco: Never   • Tobacco comments:     04/18/2022-- max 1 pack per day   Vaping Use   • Vaping Use: Never used   Substance and Sexual Activity   • Alcohol use: Not Currently   • Drug use: Never   • Sexual activity: Yes         Current Outpatient Medications:   •  amLODIPine (NORVASC) 10 mg tablet, Take 1 tablet (10 mg total) by mouth daily, Disp: 90 tablet, Rfl: 3  •  ascorbic acid (VITAMIN C) 250 mg tablet, Take 500 mg by mouth daily, Disp: , Rfl:   •  atorvastatin (LIPITOR) 10 mg tablet, Take 1 tablet (10 mg total) by mouth daily, Disp: 90 tablet, Rfl: "1  •  celecoxib (CeleBREX) 100 mg capsule, Take 1 capsule (100 mg total) by mouth 2 (two) times a day, Disp: 180 capsule, Rfl: 3  •  diazepam (VALIUM) 2 mg tablet, Take 1 tablet (2 mg total) by mouth every 6 (six) hours as needed for anxiety, Disp: 30 tablet, Rfl: 0  •  Multiple Vitamin (multivitamin) tablet, Take 1 tablet by mouth daily, Disp: , Rfl:   •  omeprazole (PriLOSEC) 20 mg delayed release capsule, Take 1 capsule by mouth once daily, Disp: 30 capsule, Rfl: 0    Allergies   Allergen Reactions   • Flexeril [Cyclobenzaprine] Other (See Comments)     Patient reported \"Dry Mouth\"   • Medical Tape Hives       Physical Exam:    /100   Pulse (!) 116   Ht 5' 3\" (1 6 m)   Wt 84 4 kg (186 lb)   LMP  (LMP Unknown) Comment: months ago  BMI 32 95 kg/m²     Constitutional:normal, well developed, well nourished, alert, in no distress and non-toxic and no overt pain behavior    Eyes:anicteric  HEENT:grossly intact  Neck:supple, symmetric, trachea midline and no masses   Pulmonary:even and unlabored  Cardiovascular:No edema or pitting edema present  Skin:Normal without rashes or lesions and well hydrated  Psychiatric: anxious  Neurologic:Cranial Nerves II-XII grossly intact  Musculoskeletal:normal gait    Imaging    MRI Brain w/ and w/o contrast 5/5/23:        MRI cervical spine w/ and w/o contrast 5/5/23:      "

## 2023-05-17 ENCOUNTER — TELEPHONE (OUTPATIENT)
Dept: NEUROSURGERY | Facility: CLINIC | Age: 53
End: 2023-05-17

## 2023-05-17 NOTE — TELEPHONE ENCOUNTER
The thoracic MRI originally requested a few weeks ago was denied because my insurance wanted to know the results of the Brain & Cervical spine MRIs first  Since I will be coming to your office on the 24th of this month, I was wondering if anyone is able to send those results to my insurance ([employer]) to see if they might approve the thoracic open MRI before my visit (and time enough to schedule it and have radiologist read and send to you)? Or is this not possible? Just thought I'd ask   thank you - this message was received from [address] [place] [address] PT LAST  ABOUT THIS - SHE IS AWARE MRI TSPINE WAS DENIED AFTER [facility] DID PEER TO PEER - SHE CAN STILL KEEP HER APT WITH US NEXT WEEK [facility] JANIE  EVEN THOUGH HER TSPINE WAS DENIED IF  WE NEED TO RE SUBMIT THAT PRECERT AGAIN THEN WE WILL HAVE OFFICE VISIT PT STATES  [facility] AND CSPINE MRIS SHE JUST SAW HIM AND HE REVIEWED BOTH HE ADVISED HER TO KEEP APT WITH NEUROLOGY - SHE STATES HER APT IS IN NOV - I SENT EMAIL ASKING TO GET THAT APT MOVED UP IF WE CAN -PT WAS ADVISED OF ALL THIS -

## 2023-05-18 ENCOUNTER — TELEPHONE (OUTPATIENT)
Dept: NEUROSURGERY | Facility: CLINIC | Age: 53
End: 2023-05-18

## 2023-05-18 ENCOUNTER — TELEPHONE (OUTPATIENT)
Dept: FAMILY MEDICINE CLINIC | Facility: CLINIC | Age: 53
End: 2023-05-18

## 2023-05-18 DIAGNOSIS — R51.9 FACIAL PAIN: Primary | ICD-10-CM

## 2023-05-18 DIAGNOSIS — R20.2 PARESTHESIAS: ICD-10-CM

## 2023-05-18 DIAGNOSIS — M94.0 TIETZE SYNDROME: ICD-10-CM

## 2023-05-18 DIAGNOSIS — G44.52 NEW DAILY PERSISTENT HEADACHE: ICD-10-CM

## 2023-05-18 RX ORDER — CELECOXIB 200 MG/1
200 CAPSULE ORAL 2 TIMES DAILY
Qty: 180 CAPSULE | Refills: 3 | Status: SHIPPED | OUTPATIENT
Start: 2023-05-18

## 2023-05-18 NOTE — TELEPHONE ENCOUNTER
Patient called  Feels like her body is getting weaker  Patient experiencing anxiety because she feels like she can't breathe  Patient did talk to NeuroSurg-- was advised to see PCP or ER  Advised Patient that PCP is out of office today   Advised ER if she continues to feel like she can't catch her breath  patient understood

## 2023-05-18 NOTE — TELEPHONE ENCOUNTER
Received a call from patient requesting a call back stating she is having trouble breathing and swallowing  Returned call to patient who stated she has been having these issues for a while however notices it is worse when she stands up  Directed patient to present to the ER if she is having trouble breathing  Also encouraged her to reach out to her PCP for follow up on these symptoms  She stated an understanding and was appreciative of the call back

## 2023-05-18 NOTE — TELEPHONE ENCOUNTER
[Yesterday 4:49 PM] Khushi Shaw CAN U HELP ME OUT AMANUEL MILLER SHE IS BOOKED NEXT AVAIL IN NOV CAN WE PLEASE MOVE HER UP ?         [Yesterday 4:50 PM] Pippa Adhikari  let me take a look     [Yesterday 5:03 PM] Medina Bowen just looked through Dr Jason Hare schedule  She is one of the few General Attendings we have, and I didn't see anything sooner  I will ask her MA on Friday when I get back  I did see some things on hold where they were moving patients around, but if they don't need them, maybe we can get your patient there  Otherwise, I will check and see if there is a provider that could see her for parethesias    but I will check on Friday     I called pt to advise that nothing sooner then what she has now but I did message neurology to see if they can get her in sooner - I pardo dpt to update her - looks like Dr Angie Serrano reffered her to neurology -[place]    Pt called me back advised sl neurology is trying to work on sooner apt but what she has is the soonest as of right now - neurology order was placed by dr Rodriguez Urban office _[place]

## 2023-05-24 ENCOUNTER — OFFICE VISIT (OUTPATIENT)
Dept: NEUROSURGERY | Facility: CLINIC | Age: 53
End: 2023-05-24

## 2023-05-24 ENCOUNTER — TELEPHONE (OUTPATIENT)
Dept: NEUROSURGERY | Facility: CLINIC | Age: 53
End: 2023-05-24

## 2023-05-24 VITALS
HEIGHT: 63 IN | WEIGHT: 190 LBS | BODY MASS INDEX: 33.66 KG/M2 | RESPIRATION RATE: 19 BRPM | TEMPERATURE: 98 F | SYSTOLIC BLOOD PRESSURE: 126 MMHG | DIASTOLIC BLOOD PRESSURE: 82 MMHG | HEART RATE: 108 BPM | OXYGEN SATURATION: 98 %

## 2023-05-24 DIAGNOSIS — R20.0 BILATERAL NUMBNESS AND TINGLING OF ARMS AND LEGS: Primary | ICD-10-CM

## 2023-05-24 DIAGNOSIS — R20.2 BILATERAL NUMBNESS AND TINGLING OF ARMS AND LEGS: Primary | ICD-10-CM

## 2023-05-24 NOTE — PROGRESS NOTES
Neurosurgery Office Note  Queen Jose Luis 48 y o  female MRN: [id number]      Assessment/Plan     Spondylosis of cervical region without myelopathy or radiculopathy  Patient seen in outpatient office today for further work-up and evaluation of worsening symptoms  · Patient known to our service was last seen in 2021, when she had subjective bilateral arm and hand weakness  MRI was not recommended at that time but she was referred to neurology  · Patient with history of tietze and costochondritis and was doing physical therapy when on 4/4 she lifted her arms above her head with a cane and twisted side to side and developed significant neck tightness as well as numbness and tingling in her hands as well as head heaviness as well as numbness and tingling in her legs  The symptoms have persisted since that time and she has presented to the emergency department twice in regards to the symptoms  · Patient also reports headaches as well as pressure and pain in her jaw which has improved  · She also endorses neck tightness feels like a rubber band anteriorly but does not radiate into her arms  She does endorse whole arm numbness and tingling especially in her hands and fingers as well as weakness  She denies any difficulty with fine motor skills but is dropping objects  · She has seen pain management but no injections  Imaging reviewed with Dr Ramirez:    MRI cervical spine w/wo 5/5/23: Done in outside facility, intrinsic disc and possible spur at C5-6 on the left with mild compression of the lateral cord at this level  Mild foraminal encroachment at this level due to spurring bilaterally  Severe degenerative disc disease at C4-5 and C6-7 with mild narrowing of the spinal canal at C4-5 with no definitive cord compression  Mild disc bulge at C3-4  MRI brain w/wo 5/5/2023: Normal MRI imaging      Plan:  · Reviewed imaging with patient extensively and reviewed cervical spine anatomy etc   · Ordered MRI thoracic spine to assess for any etiology given lower extremity symptoms  · Also ordered B/L UE/LE EMGs given ongoing diffuse numbness and tingling  · Patient was referred to neurology, has appointment scheduled for next month  · Patient also referred to ENT and has appointment coming up as well  · Patient will follow-up in the next few weeks once studies completed or sooner symptoms worsen as joint appointment with [facility]  · Patient made aware to seek care sooner if she develops any new or worsening neurological change or flag signs  · Patient made aware to contact neurosurgery with any further questions or concerns  Diagnoses and all orders for this visit:    Bilateral numbness and tingling of arms and legs  -     EMG 2 Limb Upper Extremity; Future  -     EMG 2 limb lower extremity; Future          I have spent a total time of 40 minutes on 05/24/23 in caring for this patient including Diagnostic results, Prognosis, Risks and benefits of tx options, Instructions for management, Patient and family education, Importance of tx compliance, Risk factor reductions, Impressions, Counseling / Coordination of care, Documenting in the medical record, Reviewing / ordering tests, medicine, procedures  , Obtaining or reviewing history   and Communicating with other healthcare professionals   CHIEF COMPLAINT    Chief Complaint   Patient presents with   • Follow-up       HISTORY    History of Present Illness     48y o  year old female with past medical history significant for hypertension, Raynaud's disease, tietza syndrome, cataracts, HPV, tobacco use, cervical stenosis, GERD, and costochondritis  Patient seen in outpatient office for further work-up and evaluation of worsening symptoms      Patient known to our service and was last seen in 2021, when she was having subjective bilateral arm and hand weakness  MRI at that time was not recommended    Patient was to follow-up with neurology      Patient states for her "tietze and costochondritis she was doing physical therapy and did 9 sessions most recent session on 4/4 when she lifted her arms above her head with a cane and twisted side-to-side, she had significant neck tightness as well as numbness and tingling in her arms as well as heaviness in her head and weakness in her arms as well as tingling in her legs which have persistent since that time  She presented to the ER twice with similar symptoms  She was referred to pain management who referred her to neurology and ordered MRI cervical spine and brain  She also endorses since her session with physical therapy having a lot of neck pressure and tightness as well as headaches and pressure in her jaw  She states it is hard to chew or brush her teeth she is eating soft foods  She denies any numbness or tingling in her face  Patient states since I last saw her in the office her neck tightness has worsened  She states that the pain in her jaw has improved  When the anterior neck tightness is severe and feels like a rubber band sensation she does have a hard time swallowing at times  She states if she stands up straight she feels like her legs are weak  She also endorses if she is holding things up with her arms they feel weak  She states recently she was sitting in a recliner for about 2 hours and developed mid back pain which radiation under her bilateral breast and feels like a bruising and she at times has a hard time breathing  She is currently complaining of 9/10 anterior neck tightness and feels like a rubber band  She states her posterior neck pain is more sharp  She reports if she puts her arms up she feels some relief in her neck tightness  She states more recently she feels \"itching\" in her head as well as her hands and feet as well as sensation of extra saliva  She continues to endorse tingling down her bilateral whole arms but mostly in her hands and fingers as well as arm weakness    She states using " her arms and certain positions will worsen her symptoms  She states resting her arms helps  She also endorses numbness and tingling in her feet which is off and on as well  She denies any recent falls or traumas or difficulty with her balance  She denies any difficulty with fine motor skills but does endorse dropping objects  She states her arms tire out very quickly as well as her legs  She denies any dizziness, blurry vision, chest pain, shortness of breath, abdominal pain, nausea, vomiting, diarrhea, no positive bowel or bladder, no new weakness or numbness/tingling  She states she did see pain management but has not received any injections  She did see physical therapy for about 9 sessions which made her pain and symptoms worse  HPI    See Discussion    REVIEW OF SYSTEMS    Review of Systems   Constitutional: Negative  HENT: Positive for trouble swallowing (hard to swallow when muscles in neck are tight)  Eyes: Negative  Respiratory: Positive for shortness of breath  Cardiovascular: Negative  Gastrointestinal: Positive for abdominal pain and nausea (at times)  Endocrine: Negative  Genitourinary: Positive for frequency  Musculoskeletal: Positive for back pain (cant pin point where it is ), gait problem (no recent falls, ), myalgias (cramps in legs), neck pain (middle of neck does not radiate from side to side can lifet hands above head  improved) and neck stiffness (poor rom very tight)  Skin: Negative  Allergic/Immunologic: Negative  Neurological: Positive for weakness ( and arms), numbness (both arm legs and feet nand tingling) and headaches (daily occurance  top of head feels a heavy pressure and face)  Negative for dizziness (rare)  Hematological: Does not bruise/bleed easily  Psychiatric/Behavioral: Positive for sleep disturbance (interrupted sleep due to pain and cramps in legs)         ROS reviewed with patient and agree and changes were made as "needed    Meds/Allergies     Current Outpatient Medications   Medication Sig Dispense Refill   • amLODIPine (NORVASC) 10 mg tablet Take 1 tablet (10 mg total) by mouth daily 90 tablet 3   • ascorbic acid (VITAMIN C) 250 mg tablet Take 500 mg by mouth daily     • atorvastatin (LIPITOR) 10 mg tablet Take 1 tablet (10 mg total) by mouth daily 90 tablet 1   • celecoxib (CeleBREX) 200 mg capsule Take 1 capsule (200 mg total) by mouth 2 (two) times a day 180 capsule 3   • diazepam (VALIUM) 2 mg tablet Take 1 tablet (2 mg total) by mouth every 6 (six) hours as needed for anxiety 30 tablet 0   • Multiple Vitamin (multivitamin) tablet Take 1 tablet by mouth daily     • omeprazole (PriLOSEC) 20 mg delayed release capsule Take 1 capsule by mouth once daily 30 capsule 0     No current facility-administered medications for this visit         Allergies   Allergen Reactions   • Flexeril [Cyclobenzaprine] Other (See Comments)     Patient reported \"Dry Mouth\"   • Medical Tape Hives       PAST HISTORY    Past Medical History:   Diagnosis Date   • Acid reflux    • Anxiety    • Anxiety    • Bacterial vaginosis    • Cleft palate    • Costochondritis    • DDD (degenerative disc disease), cervical    • Frequency of urination    • GERD (gastroesophageal reflux disease)    • HPV (human papilloma virus) anogenital infection    • HPV (human papilloma virus) infection    • Hyperhidrosis    • Hyperlipidemia    • Hypertension    • Perforated ear drum    • Raynaud's disease    • Tobacco dependence    • Umbilical hernia    • Vocal cords swelling    • Wears glasses        Past Surgical History:   Procedure Laterality Date   • CERVICAL BIOPSY N/A 9/13/2021    Procedure: CONE BIOPSY OF THE CERVIX;  Surgeon: Xin Garcia MD;  Location: [address] [place] [address];  Service: Gynecology   • CLEFT PALATE REPAIR     • TYMPANOPLASTY Left    • TYMPANOSTOMY TUBE PLACEMENT         Social History     Tobacco Use   • Smoking status: Every Day     Packs/day: 1 00     Years: " "37 00     Total pack years: 37 00     Types: Cigarettes     Start date: 1985   • Smokeless tobacco: Never   • Tobacco comments:     04/18/2022-- max 1 pack per day   Vaping Use   • Vaping Use: Never used   Substance Use Topics   • Alcohol use: Not Currently   • Drug use: Never       Family History   Problem Relation Age of Onset   • Lung cancer Mother    • Mental illness Mother    • Heart disease Father    • Hypertension Father    • Uterine cancer Sister    • No Known Problems Maternal Grandmother    • No Known Problems Maternal Grandfather    • No Known Problems Paternal Grandmother    • No Known Problems Paternal Grandfather    • No Known Problems Maternal Aunt    • Cancer Paternal Aunt         unknown origin          Above history personally reviewed  EXAM    Vitals:Blood pressure 126/82, pulse (!) 108, temperature 98 °F (36 7 °C), resp  rate 19, height 5' 3\" (1 6 m), weight 86 2 kg (190 lb), SpO2 98 %  ,Body mass index is 33 66 kg/m²  Physical Exam  Vitals reviewed  Constitutional:       General: She is awake  Appearance: Normal appearance  She is well-developed  Comments: Very anxious    HENT:      Head: Normocephalic and atraumatic  Eyes:      Extraocular Movements: Extraocular movements intact and EOM normal       Conjunctiva/sclera: Conjunctivae normal       Pupils: Pupils are equal, round, and reactive to light  Cardiovascular:      Rate and Rhythm: Normal rate  Pulmonary:      Effort: Pulmonary effort is normal  No respiratory distress  Chest:      Chest wall: No tenderness  Abdominal:      General: There is no distension  Palpations: Abdomen is soft  Tenderness: There is no abdominal tenderness  Musculoskeletal:         General: Normal range of motion  Cervical back: Normal range of motion and neck supple  Tenderness present  Spinous process tenderness present  No muscular tenderness  Thoracic back: Tenderness present  Lumbar back: No tenderness   " Skin:     General: Skin is warm and dry  Neurological:      Mental Status: She is alert and oriented to person, place, and time  Motor: Motor strength is normal      Coordination: Finger-Nose-Finger Test normal       Gait: Gait is intact  Deep Tendon Reflexes:      Reflex Scores:       Bicep reflexes are 2+ on the right side and 2+ on the left side  Patellar reflexes are 2+ on the right side and 2+ on the left side  Psychiatric:         Attention and Perception: Attention and perception normal          Mood and Affect: Mood and affect normal          Speech: Speech normal          Behavior: Behavior normal  Behavior is cooperative  Thought Content: Thought content normal          Cognition and Memory: Cognition and memory normal          Judgment: Judgment normal          Neurologic Exam     Mental Status   Oriented to person, place, and time  Follows 2 step commands  Attention: normal  Concentration: normal    Speech: speech is normal   Level of consciousness: alert  Knowledge: good  Able to perform simple calculations  Able to name object  Normal comprehension  Cranial Nerves     CN III, IV, VI   Pupils are equal, round, and reactive to light  Extraocular motions are normal    CN III: no CN III palsy  CN VI: no CN VI palsy  Nystagmus: none   Diplopia: none  Conjugate gaze: present    CN V   Facial sensation intact  CN VII   Facial expression full, symmetric  CN VIII   CN VIII normal    Hearing: intact    CN IX, X   CN IX normal      CN XI   CN XI normal      CN XII   CN XII normal      Motor Exam   Muscle bulk: normal  Overall muscle tone: normal  Right arm pronator drift: absent  Left arm pronator drift: absent    Strength   Strength 5/5 throughout       Sensory Exam   Light touch normal    Proprioception normal    JPS and DST intact     Gait, Coordination, and Reflexes     Gait  Gait: normal    Coordination   Finger to nose coordination: normal    Tremor   Resting tremor: absent  Intention tremor: absent  Action tremor: absent    Reflexes   Right biceps: 2+  Left biceps: 2+  Right patellar: 2+  Left patellar: 2+  Right Xavier: absent  Left Xavier: present  Right ankle clonus: absent  Left ankle clonus: absent        MEDICAL DECISION MAKING    Imaging Studies:     MRI outside images    Result Date: 5/24/2023  Narrative: 1 2 392 [id number] 100 12 11 [id number]    MRI outside images    Result Date: 5/24/2023  Narrative: 1 2 392 [id number] 9681 100 12 11 [id number]      I have personally reviewed pertinent reports     and I have personally reviewed pertinent films in PACS

## 2023-05-24 NOTE — ASSESSMENT & PLAN NOTE
Patient seen in outpatient office today for further work-up and evaluation of worsening symptoms  · Patient known to our service was last seen in 2021, when she had subjective bilateral arm and hand weakness  MRI was not recommended at that time but she was referred to neurology  · Patient with history of tietze and costochondritis and was doing physical therapy when on 4/4 she lifted her arms above her head with a cane and twisted side to side and developed significant neck tightness as well as numbness and tingling in her hands as well as head heaviness as well as numbness and tingling in her legs  The symptoms have persisted since that time and she has presented to the emergency department twice in regards to the symptoms  · Patient also reports headaches as well as pressure and pain in her jaw which has improved  · She also endorses neck tightness feels like a rubber band anteriorly but does not radiate into her arms  She does endorse whole arm numbness and tingling especially in her hands and fingers as well as weakness  She denies any difficulty with fine motor skills but is dropping objects  · She has seen pain management but no injections  Imaging reviewed with Dr Ramirez:    MRI cervical spine w/wo 5/5/23: Done in outside facility, intrinsic disc and possible spur at C5-6 on the left with mild compression of the lateral cord at this level  Mild foraminal encroachment at this level due to spurring bilaterally  Severe degenerative disc disease at C4-5 and C6-7 with mild narrowing of the spinal canal at C4-5 with no definitive cord compression  Mild disc bulge at C3-4  MRI brain w/wo 5/5/2023: Normal MRI imaging  Plan:  · Reviewed imaging with patient extensively and reviewed cervical spine anatomy etc   · Ordered MRI thoracic spine to assess for any etiology given lower extremity symptoms  · Also ordered B/L UE/LE EMGs given ongoing diffuse numbness and tingling    · Patient was referred to neurology, has appointment scheduled for next month  · Patient also referred to ENT and has appointment coming up as well  · Patient will follow-up in the next few weeks once studies completed or sooner symptoms worsen as joint appointment with [facility]  · Patient made aware to seek care sooner if she develops any new or worsening neurological change or flag signs  · Patient made aware to contact neurosurgery with any further questions or concerns

## 2023-05-24 NOTE — PATIENT INSTRUCTIONS
Patient will follow up in the next few weeks once T spine MRI and EMGs completed or sooner if symptoms worsen     Continue f/u with neurology and ENT

## 2023-05-24 NOTE — TELEPHONE ENCOUNTER
Kevin Galeas (48 yrs)                                                    [phone]       Hi She is booked for oct next avail for two emgs bennie upper &bennie lowers can we please accommodate her before next apt with us 23 for surgical discussion Zack Fernandez will go anywhere and anytime thank u so much ladies !       JEANETH - I sent this [facility] eto dept to get pt accomodated for her emgs before next apt pt advised and they will call her directly -_BA

## 2023-05-25 ENCOUNTER — TELEPHONE (OUTPATIENT)
Dept: FAMILY MEDICINE CLINIC | Facility: CLINIC | Age: 53
End: 2023-05-25

## 2023-05-25 DIAGNOSIS — M94.0 TIETZE SYNDROME: Primary | ICD-10-CM

## 2023-05-25 RX ORDER — METHYLPREDNISOLONE 4 MG/1
TABLET ORAL
Qty: 21 EACH | Refills: 0 | Status: SHIPPED | OUTPATIENT
Start: 2023-05-25

## 2023-05-25 NOTE — TELEPHONE ENCOUNTER
Referred to [facility] rheumatology    A Medrol Dosepak has been transmitted to the pharmacy

## 2023-05-25 NOTE — TELEPHONE ENCOUNTER
[facility]; patient becoming very anxious with her current conditions  With the anxiety, patient has an increased difficulty breathing, with an increased feeling that her throat is restricting  1 Patient would like a second opinion to [facility]  2  Requesting if there is a steriod patient could be taking to help with the inflammation  Patient is given Celebrex from [facility] but seems less than effective

## 2023-06-01 NOTE — TELEPHONE ENCOUNTER
Patient called; [facility] did not receive referral  Provided with another fax number - [phone]  Printed and faxed referral to above

## 2023-06-06 ENCOUNTER — OFFICE VISIT (OUTPATIENT)
Dept: FAMILY MEDICINE CLINIC | Facility: CLINIC | Age: 53
End: 2023-06-06
Payer: COMMERCIAL

## 2023-06-06 VITALS
BODY MASS INDEX: 32.43 KG/M2 | RESPIRATION RATE: 20 BRPM | TEMPERATURE: 98.9 F | DIASTOLIC BLOOD PRESSURE: 84 MMHG | HEART RATE: 84 BPM | SYSTOLIC BLOOD PRESSURE: 136 MMHG | WEIGHT: 183 LBS | HEIGHT: 63 IN

## 2023-06-06 DIAGNOSIS — R13.10 DYSPHAGIA, UNSPECIFIED TYPE: ICD-10-CM

## 2023-06-06 DIAGNOSIS — M53.82 NECK MUSCLE WEAKNESS: ICD-10-CM

## 2023-06-06 DIAGNOSIS — K21.9 GASTROESOPHAGEAL REFLUX DISEASE WITHOUT ESOPHAGITIS: ICD-10-CM

## 2023-06-06 DIAGNOSIS — M50.30 DEGENERATION OF INTERVERTEBRAL DISC OF CERVICAL REGION: ICD-10-CM

## 2023-06-06 DIAGNOSIS — M94.0 TIETZE SYNDROME: Primary | ICD-10-CM

## 2023-06-06 PROCEDURE — 99214 OFFICE O/P EST MOD 30 MIN: CPT | Performed by: FAMILY MEDICINE

## 2023-06-06 RX ORDER — METHYLPREDNISOLONE 4 MG/1
TABLET ORAL
Qty: 21 EACH | Refills: 0 | Status: SHIPPED | OUTPATIENT
Start: 2023-06-06

## 2023-06-06 NOTE — PROGRESS NOTES
Assessment/Plan: Costochondritis   Tietze syndrome may be treated with a Medrol Dosepak  The patient was advised to try taking the Valium 3 times a day for 10 days and see how she does with her swallowing issue  The patient will need follow-up ENT-neurology evaluation coming up and has seen neurosurgery  Patient continues with a sensation of bleeding neck trouble swallowing and lower extremity weakness    Mobility ulcers of the cervical region without myelopathy or radiculopathy May 24, 2023 neurosurgery notes reviewed and appreciated  Noted severe degenerative disc disease C4-C5 and C5-C6 with mild narrowing of the spinal canal at C4-C5-no definite cord compression  MRI of the brain May 5, 2023 showed normal MRI imaging  The patient is scheduled for EMGs of the upper and lower extremities    Hypertensive cardiovascular disease with blood pressure controlled on current regimen    GERD Prilosec minimizes symptoms of reflux    Problem List Items Addressed This Visit        Musculoskeletal and Integument    Tietze syndrome        Diagnoses and all orders for this visit:    Tietze syndrome        No problem-specific Assessment & Plan notes found for this encounter  PHQ-2/9 Depression Screening    Little interest or pleasure in doing things: 1 - several days  Feeling down, depressed, or hopeless: 0 - not at all          Body mass index is 32 42 kg/m²  BMI Counseling: Body mass index is 32 42 kg/m²  The BMI     Subjective:      Patient ID: Ines Harper is a 48 y o  female      Patient presents for evaluation of costochondritis, also complains of difficulty swallowing weakness weakness being evaluated by neurosurgery neurology and ENT      The following portions of the patient's history were reviewed and updated as appropriate:   She has a past medical history of Acid reflux, Anxiety, Anxiety, Bacterial vaginosis, Cleft palate, Costochondritis, DDD (degenerative disc disease), cervical, Frequency of urination, GERD (gastroesophageal reflux disease), HPV (human papilloma virus) anogenital infection, HPV (human papilloma virus) infection, Hyperhidrosis, Hyperlipidemia, Hypertension, Perforated ear drum, Raynaud's disease, Tobacco dependence, Umbilical hernia, Vocal cords swelling, and Wears glasses  ,  does not have any pertinent problems on file  ,   has a past surgical history that includes Cleft palate repair; Tympanostomy tube placement; Tympanoplasty (Left); and Cervical biopsy (N/A, 9/13/2021)  ,  family history includes Cancer in her paternal aunt; Heart disease in her father; Hypertension in her father; Lung cancer in her mother; Mental illness in her mother; No Known Problems in her maternal aunt, maternal grandfather, maternal grandmother, paternal grandfather, and paternal grandmother; Uterine cancer in her sister  ,   reports that she has been smoking cigarettes  She started smoking about 38 years ago  She has a 37 00 pack-year smoking history  She has never used smokeless tobacco  She reports that she does not currently use alcohol  She reports that she does not use drugs  ,  is allergic to flexeril [cyclobenzaprine] and medical tape     Current Outpatient Medications   Medication Sig Dispense Refill   • amLODIPine (NORVASC) 10 mg tablet Take 1 tablet (10 mg total) by mouth daily 90 tablet 3   • atorvastatin (LIPITOR) 10 mg tablet Take 1 tablet (10 mg total) by mouth daily 90 tablet 1   • celecoxib (CeleBREX) 200 mg capsule Take 1 capsule (200 mg total) by mouth 2 (two) times a day 180 capsule 3   • diazepam (VALIUM) 2 mg tablet Take 1 tablet (2 mg total) by mouth every 6 (six) hours as needed for anxiety 30 tablet 0   • methylPREDNISolone 4 MG tablet therapy pack Use as directed on package (Patient not taking: Reported on 6/6/2023) 21 each 0   • omeprazole (PriLOSEC) 20 mg delayed release capsule Take 1 capsule by mouth once daily 30 capsule 0     No current facility-administered medications for this "visit  Review of Systems   Constitutional: Negative for chills and fever  HENT: Negative for ear pain and sore throat  Eyes: Negative for pain and visual disturbance  Respiratory: Negative for cough and shortness of breath  Cardiovascular: Negative for chest pain and palpitations  Costochondral tenderness to palpation   Gastrointestinal: Negative for abdominal pain and vomiting  Genitourinary: Negative for dysuria and hematuria  Musculoskeletal: Negative for arthralgias and back pain  Skin: Negative for color change and rash  Neurological: Positive for weakness  Negative for seizures and syncope  Weakness of her neck difficulty swallowing weakness of her upper and lower extremities   All other systems reviewed and are negative  Objective:    /84   Pulse 84   Temp 98 9 °F (37 2 °C)   Resp 20   Ht 5' 3\" (1 6 m)   Wt 83 kg (183 lb)   BMI 32 42 kg/m²   Body mass index is 32 42 kg/m²  Physical Exam  Constitutional:       Appearance: Normal appearance  She is well-developed  HENT:      Head: Normocephalic and atraumatic  Right Ear: Tympanic membrane, ear canal and external ear normal       Left Ear: Tympanic membrane, ear canal and external ear normal       Nose: Nose normal       Mouth/Throat:      Mouth: Mucous membranes are moist       Pharynx: Oropharynx is clear  Eyes:      Extraocular Movements: Extraocular movements intact  Conjunctiva/sclera: Conjunctivae normal       Pupils: Pupils are equal, round, and reactive to light  Cardiovascular:      Rate and Rhythm: Normal rate and regular rhythm  Pulses: Normal pulses  Heart sounds: Normal heart sounds  Pulmonary:      Effort: Pulmonary effort is normal       Breath sounds: Normal breath sounds  Abdominal:      General: Abdomen is flat  Bowel sounds are normal       Palpations: Abdomen is soft  Tenderness: There is no abdominal tenderness     Musculoskeletal:         General: " Normal range of motion  Cervical back: Normal range of motion and neck supple  Skin:     General: Skin is warm and dry  Capillary Refill: Capillary refill takes less than 2 seconds  Neurological:      General: No focal deficit present  Mental Status: She is alert and oriented to person, place, and time  Psychiatric:         Mood and Affect: Mood normal          Behavior: Behavior normal          Thought Content:  Thought content normal          Judgment: Judgment normal

## 2023-06-13 DIAGNOSIS — K21.9 GASTROESOPHAGEAL REFLUX DISEASE: ICD-10-CM

## 2023-06-13 RX ORDER — OMEPRAZOLE 20 MG/1
CAPSULE, DELAYED RELEASE ORAL
Qty: 30 CAPSULE | Refills: 0 | Status: SHIPPED | OUTPATIENT
Start: 2023-06-13

## 2023-06-14 ENCOUNTER — APPOINTMENT (OUTPATIENT)
Dept: LAB | Facility: CLINIC | Age: 53
End: 2023-06-14
Payer: COMMERCIAL

## 2023-06-14 DIAGNOSIS — R13.10 PROBLEMS WITH SWALLOWING AND MASTICATION: ICD-10-CM

## 2023-06-14 DIAGNOSIS — M62.81 MUSCLE WEAKNESS (GENERALIZED): ICD-10-CM

## 2023-06-14 LAB — CK SERPL-CCNC: 51 U/L (ref 26–192)

## 2023-06-14 PROCEDURE — 83519 RIA NONANTIBODY: CPT

## 2023-06-14 PROCEDURE — 36415 COLL VENOUS BLD VENIPUNCTURE: CPT

## 2023-06-14 PROCEDURE — 86255 FLUORESCENT ANTIBODY SCREEN: CPT

## 2023-06-14 PROCEDURE — 82550 ASSAY OF CK (CPK): CPT

## 2023-06-16 LAB — ACHR BIND AB SER-SCNC: <0.03 NMOL/L (ref 0–0.24)

## 2023-06-20 LAB — ACHR BLOCK AB/ACHR TOTAL SFR SER: 20 % (ref 0–25)

## 2023-06-21 ENCOUNTER — TELEPHONE (OUTPATIENT)
Dept: NEUROLOGY | Facility: CLINIC | Age: 53
End: 2023-06-21

## 2023-06-21 NOTE — TELEPHONE ENCOUNTER
Patient called has some questions regarding the EM) she wants to know if this is for the upper limb  2) can she bring her girlfriend in the room for the test     Please give patient a call at [phone]

## 2023-06-21 NOTE — TELEPHONE ENCOUNTER
Spoke with patient, answered all of her questions and concerns regarding her upcoming  EMG appointment

## 2023-06-22 ENCOUNTER — PROCEDURE VISIT (OUTPATIENT)
Dept: NEUROLOGY | Facility: CLINIC | Age: 53
End: 2023-06-22
Payer: COMMERCIAL

## 2023-06-22 ENCOUNTER — TELEPHONE (OUTPATIENT)
Dept: FAMILY MEDICINE CLINIC | Facility: CLINIC | Age: 53
End: 2023-06-22

## 2023-06-22 DIAGNOSIS — R20.2 BILATERAL NUMBNESS AND TINGLING OF ARMS AND LEGS: ICD-10-CM

## 2023-06-22 DIAGNOSIS — R20.0 NUMBNESS: Primary | ICD-10-CM

## 2023-06-22 DIAGNOSIS — R20.0 BILATERAL NUMBNESS AND TINGLING OF ARMS AND LEGS: ICD-10-CM

## 2023-06-22 PROCEDURE — 95886 MUSC TEST DONE W/N TEST COMP: CPT | Performed by: PSYCHIATRY & NEUROLOGY

## 2023-06-22 PROCEDURE — 95910 NRV CNDJ TEST 7-8 STUDIES: CPT | Performed by: PSYCHIATRY & NEUROLOGY

## 2023-06-22 NOTE — TELEPHONE ENCOUNTER
Sarah called in requesting a referral a referral to be faxed to    She states that patient insurance is requiring it and provided code [id number]

## 2023-06-22 NOTE — PROGRESS NOTES
EMG 2 Limb Upper Extremity     Date/Time 6/22/2023 12:20 PM     Performed by  Jillian Galvan MD   Authorized by MARANDA Ramirez           EMG BILATERAL UPPER EXTREMITIES    Patient reports symptoms of bilateral arm and hand weakness as well as heaviness of the head and neck and neck pain  She also reports similar symptoms in the legs  Motor and sensory conduction studies were performed on the median and ulnar nerves and radial sensory nerves bilaterally  The distal motor latencies were normal  The motor action potential amplitudes were normal  Motor conduction velocities were normal except conduction of the ulnar nerve across the elbow which is slow bilaterally at 36 m/s on the right and mildly prolonged at 48 m/s on the left  The median and ulnar F waves were normal bilaterally  Median, radial and ulnar sensory latencies were normal bilaterally including median palmar stimulation with normal sensory action potential amplitudes  Concentric needle EMG was performed in various distal and proximal muscles of the upper extremities bilaterally including APB, FDI, EDC, brachioradialis, biceps, triceps in the low cervical paraspinal region  There was no evidence of spontaneous activity seen  The compound motor unit potentials were of normal configuration and interference patterns were full or full for effort    IMPRESSION: This is an abnormal EMG of the bilateral upper extremities due to evidence of a localized neuropathic process involving the ulnar nerve at the elbows bilaterally consistent with cubital tunnel syndrome, moderate on the right and mild on the left with predominantly demyelinative change    JUVENCIO Rosenberg          [facility] AT [address]  [phone]    Electromyography & Nerve Conduction Studies Report          Full Name: Wei Cesar Gender: Female  MRN: [id number] YOB: 1970      Visit Date: 6/22/2023 11:43 AM  Age: 48 Years  Examining Physician: Lanre Parry MD   Referring Physician: Padmini LOW  Medical History: TEMP 32 WITH HEAT      Sensory Nerve Conduction Study       Nerve / Sites Rec  Site Onset Lat Peak Lat  Amp Segments Distance Peak Diff Velocity     ms ms µV  cm ms m/s   R Median - Dig II (Antidromic)  Wrist Index 2 6 3 6 35 6 Wrist - Index 14  54      Ref  ?3 4  ? 20 0 Ref  ?50   L Median - Dig II (Antidromic)  Wrist Index 2 5 3 5 34 8 Wrist - Index 14  56      Ref  ?3 4  ? 20 0 Ref  ?50   R Ulnar - Dig V (Antidromic)  Wrist Dig V 2 3 3 4 23 2 Wrist - Dig V 12  51      Ref  ?2 9  ? 17 0 Ref  ?50   L Ulnar - Dig V (Antidromic)  Wrist Dig V 2 4 3 4 36 6 Wrist - Dig V 12  50      Ref  ?2 9  ? 17 0 Ref  ?50   R Median, Ulnar - Palmar      Median Palm Wrist 1 3 1 8 38 8 Median Palm - Wrist 8  64      Ref  ?2 2 ? 50 0 Ref  ?50      Ulnar Palm Wrist 1 1 1 7 26 4 Ulnar Palm - Wrist 8  73      Ref  ?2 2 ? 12 0 Ref  ?50        Median Palm - Ulnar Palm  0 1         Ref  ?0 4    L Median, Ulnar - Palmar      Median Palm Wrist 1 5 1 9 57 6 Median Palm - Wrist 8  55      Ref  ?2 2 ? 50 0 Ref  ?50      Ulnar Palm Wrist 1 1 1 6 19 2 Ulnar Palm - Wrist 8  73      Ref  ?2 2 ? 12 0 Ref  ?50        Median Palm - Ulnar Palm  0 3         Ref  ?0 4    R Radial - Superficial (Antidromic)      Forearm Wrist 1 7 2 6 18 8 Forearm - Wrist 10  58      Ref  ?2 9 ? 15 0 Ref  ?50   L Radial - Superficial (Antidromic)      Forearm Wrist 1 6 2 4 23 5 Forearm - Wrist 10  64      Ref  ?2 9 ? 15 0 Ref  ?50       Motor Nerve Conduction Study       Nerve / Sites Muscle Latency Ref  Amplitude Ref  Segments Distance Lat Diff Velocity Ref       ms ms mV mV  cm ms m/s m/s   R Median - APB      Wrist APB 2 8 ?4 4 12 8 ?4 0 Wrist - APB 7         Elbow APB 6 5  12 0  Elbow - Wrist 23 3 75 61 ?49   L Median - APB      Wrist APB 2 9 ?4 4 7 0 ?4 0 Wrist - APB 7         Elbow APB 6 4  6 0  Elbow - Wrist 23 3 44 67 ?49   R Ulnar - ADM      Wrist ADM 2 3 ?3 3 6 6 ?6 0 Wrist - ADM 7         B  Elbow ADM 5 9  6 2  B  Elbow - Wrist 22 5 3 60 62 ?49      A  Elbow ADM 8 7  5 7  A  Elbow - B  Elbow 10 2 79 36 ?49   L Ulnar - ADM      Wrist ADM 2 7 ?3 3 8 5 ?6 0 Wrist - ADM 7         B  Elbow ADM 6 2  8 5  B  Elbow - Wrist 23 3 48 66 ?49      A  Elbow ADM 8 3  7 5  A  Elbow - B  Elbow 10 2 08 48 ?49       F Waves       Nerve F Latency    ms   R MEDIAN NERVE 24 6   R ULNAR NERVE    R GENERIC NERVE 25 7   L MEDIAN NERVE 26 4   L ULNAR NERVE 27 1       EMG Summary Table     Spontaneous MUAP Recruitment   Muscle Nerve Roots IA Fib PSW Fasc H F  Dur  Amp PPP Config Pattern   L  Deltoid Axillary C5-C6 NL None None None None NL NL None NL NL   R  First dorsal interosseous Ulnar C8-T1 NL None None None None NL NL None NL NL   L  First dorsal interosseous Ulnar C8-T1 NL None None None None NL NL None NL NL   R  Abductor pollicis brevis Median Q5-F3 NL None None None None NL NL None NL NL   L  Abductor pollicis brevis Median P6-L9 NL None None None None NL NL None NL NL   R  Extensor digitorum communis Radial C7-C8 NL None None None None NL NL None NL NL   L  Extensor digitorum communis Radial C7-C8 NL None None None None NL NL None NL NL   R  Brachioradialis Radial C5-C6 NL None None None None NL NL None NL NL   L  Brachioradialis Radial C5-C6 NL None None None None NL NL None NL NL   R  Biceps brachii Musculocut  C5-C6 NL None None None None NL NL None NL NL   L  Biceps brachii Musculocut  C5-C6 NL None None None None NL NL None NL NL   R  Triceps brachii Radial C6-C8 NL None None None None NL NL None NL NL   L  Triceps brachii Radial C6-C8 NL None None None None NL NL None NL NL   R  Cervical paraspinals Spinal C4-C8 NL None None None None NL NL None NL NL   L   Cervical paraspinals Spinal C4-C8 NL None None None None NL NL None NL NL

## 2023-06-23 ENCOUNTER — TELEPHONE (OUTPATIENT)
Dept: NEUROLOGY | Facility: CLINIC | Age: 53
End: 2023-06-23

## 2023-06-23 ENCOUNTER — OFFICE VISIT (OUTPATIENT)
Dept: OTOLARYNGOLOGY | Facility: CLINIC | Age: 53
End: 2023-06-23
Payer: COMMERCIAL

## 2023-06-23 VITALS
HEART RATE: 98 BPM | SYSTOLIC BLOOD PRESSURE: 112 MMHG | TEMPERATURE: 96.8 F | DIASTOLIC BLOOD PRESSURE: 72 MMHG | BODY MASS INDEX: 32.21 KG/M2 | HEIGHT: 63 IN | OXYGEN SATURATION: 98 % | WEIGHT: 181.8 LBS

## 2023-06-23 DIAGNOSIS — R13.19 OTHER DYSPHAGIA: ICD-10-CM

## 2023-06-23 DIAGNOSIS — H72.91 PERFORATION OF RIGHT TYMPANIC MEMBRANE: ICD-10-CM

## 2023-06-23 DIAGNOSIS — R49.0 MUSCLE TENSION DYSPHONIA: ICD-10-CM

## 2023-06-23 DIAGNOSIS — Z72.0 TOBACCO USE: Primary | ICD-10-CM

## 2023-06-23 DIAGNOSIS — R49.0 HOARSENESS: ICD-10-CM

## 2023-06-23 PROCEDURE — 99214 OFFICE O/P EST MOD 30 MIN: CPT | Performed by: OTOLARYNGOLOGY

## 2023-06-23 PROCEDURE — 31231 NASAL ENDOSCOPY DX: CPT | Performed by: OTOLARYNGOLOGY

## 2023-06-23 RX ORDER — HYDROXYCHLOROQUINE SULFATE 200 MG/1
400 TABLET, FILM COATED ORAL
COMMUNITY
Start: 2023-06-16

## 2023-06-23 NOTE — TELEPHONE ENCOUNTER
Patient called and has a question in regards to her test she had completed      Please call patient back at [phone]

## 2023-06-23 NOTE — TELEPHONE ENCOUNTER
Patient would like EMG results faxed to Dr Jose Ochoa in [facility] at [phone]

## 2023-06-23 NOTE — PROGRESS NOTES
Review of Systems   Constitutional: Negative  HENT: Positive for voice change  Eyes: Negative  Respiratory: Negative  Cardiovascular: Negative  Gastrointestinal: Negative  Endocrine: Negative  Genitourinary: Negative  Musculoskeletal: Negative  Skin: Negative  Allergic/Immunologic: Negative  Neurological: Negative  Hematological: Negative  Psychiatric/Behavioral: Negative

## 2023-06-23 NOTE — TELEPHONE ENCOUNTER
Spoke to patient  EMG already completed  Requesting EMG results faxed to [facility] provider, phone:  [phone]  Fax: [phone]  ATTN: Constance Hairston as requested

## 2023-06-26 LAB — ACHR MOD AB/ACHR TOTAL SFR SER: 0 % (ref 0–45)

## 2023-06-26 NOTE — ASSESSMENT & PLAN NOTE
Returns for further work-up and evaluation of worsening symptoms  · Worsening difficulty swallowing, weakness/heaviness in neck, fatigue in arms, facial pain  She denies any difficulty with fine motor skills but is dropping objects  · Seeing neurology at [facility] [place], [place]  · Exam: right eye ptosis, otherwise CN II-XII grossly intact, strength 5/5 except  4/5, LT intact, 2+ DTRs except left brachial 3+ with left Xavier's, no clonus, no drift with normal finger to nose, intact rapid finger movements, JPS 3/3 to bilateral index fingers    Imaging:  · UE EMG 6/22/23: This is an abnormal EMG of the bilateral upper extremities due to evidence of a localized neuropathic process involving the ulnar nerve at the elbows bilaterally consistent with cubital tunnel syndrome, moderate on the right and mild on the left with predominantly demyelinative change  · MRI thoracic 6/5/2023 outside facility: Mild spondylosis  No acute abnormality  · MRI cervical spine w/wo 5/5/23: Done in outside facility, intrinsic disc and possible spur at C5-6 on the left with mild compression of the lateral cord at this level  Mild foraminal encroachment at this level due to spurring bilaterally  Severe degenerative disc disease at C4-5 and C6-7 with mild narrowing of the spinal canal at C4-5 with no definitive cord compression  Mild disc bulge at C3-4  · MRI brain w/wo 5/5/2023: Normal MRI imaging  Plan:  · Reviewed imaging with patient, significant other, and Dr Marylee Messier  Degenerative disc disease seen throughout her cervical and thoracic spine  No significant canal stenosis, cord compression or foraminal stenosis to account for her symptoms  EMG also not suggesting any cervical radiculopathy  · No neurosurgical intervention recommended  · Follow-up with neurology later today as planned for continued work-up  · Follow up as needed  Call with any questions or concerns

## 2023-06-27 ENCOUNTER — OFFICE VISIT (OUTPATIENT)
Dept: NEUROSURGERY | Facility: CLINIC | Age: 53
End: 2023-06-27
Payer: COMMERCIAL

## 2023-06-27 VITALS
HEART RATE: 117 BPM | WEIGHT: 179 LBS | HEIGHT: 63 IN | SYSTOLIC BLOOD PRESSURE: 140 MMHG | OXYGEN SATURATION: 98 % | TEMPERATURE: 97.4 F | BODY MASS INDEX: 31.71 KG/M2 | DIASTOLIC BLOOD PRESSURE: 82 MMHG

## 2023-06-27 DIAGNOSIS — M48.02 FORAMINAL STENOSIS OF CERVICAL REGION: Primary | ICD-10-CM

## 2023-06-27 PROCEDURE — 99214 OFFICE O/P EST MOD 30 MIN: CPT | Performed by: PHYSICIAN ASSISTANT

## 2023-06-27 NOTE — PROGRESS NOTES
Neurosurgery Office Note  Lanette Oliveira 48 y o  female MRN: [id number]      Assessment/Plan     Foraminal stenosis of cervical region  Returns for further work-up and evaluation of worsening symptoms  · Worsening difficulty swallowing, weakness/heaviness in neck, fatigue in arms, facial pain  She denies any difficulty with fine motor skills but is dropping objects  · Seeing neurology at [facility]  · Exam: right eye ptosis, otherwise CN II-XII grossly intact, strength 5/5 except  4/5, LT intact, 2+ DTRs except left brachial 3+ with left Xavier's, no clonus, no drift with normal finger to nose, intact rapid finger movements, JPS 3/3 to bilateral index fingers    Imaging:  · UE EMG 6/22/23: This is an abnormal EMG of the bilateral upper extremities due to evidence of a localized neuropathic process involving the ulnar nerve at the elbows bilaterally consistent with cubital tunnel syndrome, moderate on the right and mild on the left with predominantly demyelinative change  · MRI thoracic 6/5/2023 outside facility: Mild spondylosis  No acute abnormality  · MRI cervical spine w/wo 5/5/23: Done in outside facility, intrinsic disc and possible spur at C5-6 on the left with mild compression of the lateral cord at this level  Mild foraminal encroachment at this level due to spurring bilaterally  Severe degenerative disc disease at C4-5 and C6-7 with mild narrowing of the spinal canal at C4-5 with no definitive cord compression  Mild disc bulge at C3-4  · MRI brain w/wo 5/5/2023: Normal MRI imaging  Plan:  · Reviewed imaging with patient, significant other, and Dr Mindi Fontenot  Degenerative disc disease seen throughout her cervical and thoracic spine  No significant canal stenosis, cord compression or foraminal stenosis to account for her symptoms  EMG also not suggesting any cervical radiculopathy  · No neurosurgical intervention recommended    · Follow-up with neurology later today as planned for continued work-up  · Follow up as needed  Call with any questions or concerns  Diagnoses and all orders for this visit:    Foraminal stenosis of cervical region          I have spent a total time of 40 minutes on 06/27/23 in caring for this patient including Diagnostic results, Instructions for management, Impressions, Counseling / Coordination of care, Documenting in the medical record, Reviewing / ordering tests, medicine, procedures  , Obtaining or reviewing history   and Communicating with other healthcare professionals   CHIEF COMPLAINT    Chief Complaint   Patient presents with   • Follow-up       HISTORY    History of Present Illness     48y o  year old female     48year old female returns for follow up  Patient known to our service and was seen in 2021, when she was having subjective bilateral arm and hand weakness  MRI at that time was not recommended  Patient was to follow-up with neurology  Returned recently for worsening symptoms  Her issues started 4/4/23 while at PT  She had significant neck tightness as well as numbness and tingling in her arms as well as heaviness in her head and weakness in her arms as well as tingling in her legs which have persistent since that time  She presented to the ER twice with similar symptoms  She was referred to pain management who referred her to neurology and ordered MRI cervical spine and brain  She also endorses since her session with physical therapy having a lot of neck pressure and tightness as well as headaches and pressure in her jaw  She states it is hard to chew or brush her teeth she is eating soft foods  She denies any numbness or tingling in her face  TMJ has been ruled out  At times it feels like she has a hard time breathing due to pain in her back and her abdomen  Her neck heaviness and difficulty swallowing have worsened  It is hard to hold her head up at times   Recently started to feel like food was going up her nose and even the act of swallowing saliva is hard  Progressively feels like she is getting weaker, even with projecting her voice  Right eye ptosis is new for her  She has intermittent leg weakness and intermittent weakness in arms  Trouble with dropping objects, but no fine motor issues though her arms tire out easily  She states she did see pain management but has not received any injections  PT for about 9 sessions made her pain and symptoms worse  PMHx of HTN, Raynaud's, tietze syndrome, HPV, tobacco use, cervical stenosis, GERD, and costochondritis  See Discussion    REVIEW OF SYSTEMS    Review of Systems   Constitutional: Negative  HENT: Positive for trouble swallowing (hard to swallow when muscles in neck are tight)  When she eats/drinks it goes up her nose      Eyes: Negative  Has drooping eye   Respiratory: Positive for shortness of breath  Cardiovascular: Negative  Gastrointestinal: Negative  Endocrine: Negative  Genitourinary: Negative  Musculoskeletal: Positive for back pain (wraps into her rib cage ), myalgias (cramps in legs), neck pain (middle of neck does not radiate from side to side ) and neck stiffness (poor rom very tight and now its weak )  Negative for gait problem (no recent falls)  Skin: Negative  Allergic/Immunologic: Negative  Neurological: Positive for weakness (neck , , arms, hands), numbness (both arm legs and feet hand tingling) and headaches (daily occurance  top of head feels a heavy pressure and face)  Negative for dizziness, tremors, seizures and light-headedness  Hematological: Does not bruise/bleed easily  ROS obtained by MA  Reviewed  See HPI       Meds/Allergies     Current Outpatient Medications   Medication Sig Dispense Refill   • amLODIPine (NORVASC) 10 mg tablet Take 1 tablet (10 mg total) by mouth daily 90 tablet 3   • atorvastatin (LIPITOR) 10 mg tablet Take 1 tablet (10 mg total) by mouth daily 90 tablet 1   • celecoxib (CeleBREX) 200 "mg capsule Take 1 capsule (200 mg total) by mouth 2 (two) times a day (Patient taking differently: Take 200 mg by mouth daily) 180 capsule 3   • diazepam (VALIUM) 2 mg tablet Take 1 tablet (2 mg total) by mouth every 6 (six) hours as needed for anxiety 30 tablet 0   • hydroxychloroquine (PLAQUENIL) 200 mg tablet Take 400 mg by mouth daily at bedtime     • omeprazole (PriLOSEC) 20 mg delayed release capsule Take 1 capsule by mouth once daily 30 capsule 0     No current facility-administered medications for this visit         Allergies   Allergen Reactions   • Flexeril [Cyclobenzaprine] Other (See Comments)     Patient reported \"Dry Mouth\"   • Medical Tape Hives       PAST HISTORY    Past Medical History:   Diagnosis Date   • Acid reflux    • Anxiety    • Anxiety    • Bacterial vaginosis    • Cleft palate    • Costochondritis    • DDD (degenerative disc disease), cervical    • Frequency of urination    • GERD (gastroesophageal reflux disease)    • HPV (human papilloma virus) anogenital infection    • HPV (human papilloma virus) infection    • Hyperhidrosis    • Hyperlipidemia    • Hypertension    • Perforated ear drum    • Raynaud's disease    • Tobacco dependence    • Umbilical hernia    • Vocal cords swelling    • Wears glasses        Past Surgical History:   Procedure Laterality Date   • CERVICAL BIOPSY N/A 9/13/2021    Procedure: CONE BIOPSY OF THE CERVIX;  Surgeon: Gael Nieto MD;  Location: [address] [place] [address];  Service: Gynecology   • CLEFT PALATE REPAIR     • TYMPANOPLASTY Left    • TYMPANOSTOMY TUBE PLACEMENT         Social History     Tobacco Use   • Smoking status: Every Day     Packs/day: 1 00     Years: 37 00     Total pack years: 37 00     Types: Cigarettes     Start date: 1985   • Smokeless tobacco: Never   • Tobacco comments:     04/18/2022-- max 1 pack per day   Vaping Use   • Vaping Use: Never used   Substance Use Topics   • Alcohol use: Not Currently   • Drug use: Never       Family History   Problem " "Relation Age of Onset   • Lung cancer Mother    • Mental illness Mother    • Heart disease Father    • Hypertension Father    • Uterine cancer Sister    • No Known Problems Maternal Grandmother    • No Known Problems Maternal Grandfather    • No Known Problems Paternal Grandmother    • No Known Problems Paternal Grandfather    • No Known Problems Maternal Aunt    • Cancer Paternal Aunt         unknown origin          Above history personally reviewed  EXAM    Vitals:Blood pressure 140/82, pulse (!) 117, temperature (!) 97 4 °F (36 3 °C), temperature source Temporal, height 5' 3\" (1 6 m), weight 81 2 kg (179 lb), SpO2 98 %  ,Body mass index is 31 71 kg/m²  Physical Exam  Vitals reviewed  Constitutional:       General: She is awake  Appearance: Normal appearance  HENT:      Head: Normocephalic and atraumatic  Eyes:      Extraocular Movements: EOM normal       Conjunctiva/sclera: Conjunctivae normal       Pupils: Pupils are equal, round, and reactive to light  Cardiovascular:      Rate and Rhythm: Normal rate  Pulmonary:      Effort: Pulmonary effort is normal    Skin:     General: Skin is warm and dry  Neurological:      Mental Status: She is alert and oriented to person, place, and time  Coordination: Finger-Nose-Finger Test and Heel to [place] fahad Test normal       Gait: Gait is intact  Deep Tendon Reflexes:      Reflex Scores:       Bicep reflexes are 2+ on the right side and 3+ on the left side  Brachioradialis reflexes are 2+ on the right side and 2+ on the left side  Patellar reflexes are 2+ on the right side and 2+ on the left side  Achilles reflexes are 2+ on the right side and 2+ on the left side  Psychiatric:         Attention and Perception: Attention and perception normal          Mood and Affect: Mood and affect normal          Speech: Speech normal          Behavior: Behavior normal  Behavior is cooperative  Thought Content:  Thought content normal   " Cognition and Memory: Cognition and memory normal          Judgment: Judgment normal          Neurologic Exam     Mental Status   Oriented to person, place, and time  Follows 2 step commands  Attention: normal  Concentration: normal    Speech: speech is normal   Level of consciousness: alert  Knowledge: good  Able to perform simple calculations  Able to name object  Able to repeat  Normal comprehension  Cranial Nerves     CN III, IV, VI   Pupils are equal, round, and reactive to light  Extraocular motions are normal    Right pupil: Shape: regular  Reactivity: brisk  Consensual response: intact  Left pupil: Shape: regular  Reactivity: brisk  Consensual response: intact  CN III: no CN III palsy  CN VI: no CN VI palsy  Nystagmus: none   Ophthalmoparesis: none  Upgaze: normal  Downgaze: normal  Conjugate gaze: present    CN V   Facial sensation intact  CN VII   Facial expression full, symmetric       CN VIII   Hearing: intact    CN XI   Right trapezius strength: normal  Left trapezius strength: normal    CN XII   CN XII normal    Right eye ptosis     Motor Exam   Muscle bulk: normal  Overall muscle tone: normal  Right arm pronator drift: absent  Left arm pronator drift: absent  5/5 throughout except  4/5     Sensory Exam   Light touch normal      Gait, Coordination, and Reflexes     Gait  Gait: normal    Coordination   Finger to nose coordination: normal  Heel to shin coordination: normal    Tremor   Resting tremor: absent  Intention tremor: absent  Action tremor: absent    Reflexes   Right brachioradialis: 2+  Left brachioradialis: 2+  Right biceps: 2+  Left biceps: 3+  Right patellar: 2+  Left patellar: 2+  Right achilles: 2+  Left achilles: 2+  Right Xavier: absent  Left Xavier: present  Right ankle clonus: absent  Left ankle clonus: absent  Intact rapid finger movements  JPS 3/3 to bilateral index fingers         MEDICAL DECISION MAKING    Imaging Studies:     MRI outside images    Result Date: 6/27/2023  Narrative: 1 2 392 [id number] 100 12 11 [id number]    EMG 2 Limb Upper Extremity    Result Date: 6/22/2023  Narrative: Brittanie Luna MD     6/22/2023  1:55 PM   EMG 2 Limb Upper Extremity  Date/Time 6/22/2023 12:20 PM  Performed by  Brittanie Luna MD Authorized by MARANDA Shepard         I have personally reviewed pertinent reports     and I have personally reviewed pertinent films in PACS

## 2023-07-03 DIAGNOSIS — K21.9 GASTROESOPHAGEAL REFLUX DISEASE: ICD-10-CM

## 2023-07-03 RX ORDER — OMEPRAZOLE 20 MG/1
CAPSULE, DELAYED RELEASE ORAL
Qty: 30 CAPSULE | Refills: 0 | Status: SHIPPED | OUTPATIENT
Start: 2023-07-03

## 2023-07-04 DIAGNOSIS — E78.5 DYSLIPIDEMIA: ICD-10-CM

## 2023-07-04 RX ORDER — ATORVASTATIN CALCIUM 10 MG/1
TABLET, FILM COATED ORAL
Qty: 90 TABLET | Refills: 0 | Status: SHIPPED | OUTPATIENT
Start: 2023-07-04

## 2023-07-06 ENCOUNTER — OFFICE VISIT (OUTPATIENT)
Dept: PODIATRY | Facility: CLINIC | Age: 53
End: 2023-07-06
Payer: COMMERCIAL

## 2023-07-06 VITALS — WEIGHT: 179 LBS | BODY MASS INDEX: 31.71 KG/M2 | HEIGHT: 63 IN

## 2023-07-06 DIAGNOSIS — L60.0 INGROWN NAIL: Primary | ICD-10-CM

## 2023-07-06 DIAGNOSIS — M79.674 PAIN OF RIGHT GREAT TOE: ICD-10-CM

## 2023-07-06 PROCEDURE — 99203 OFFICE O/P NEW LOW 30 MIN: CPT | Performed by: STUDENT IN AN ORGANIZED HEALTH CARE EDUCATION/TRAINING PROGRAM

## 2023-07-06 RX ORDER — ESCITALOPRAM OXALATE 5 MG/1
5 TABLET ORAL DAILY
COMMUNITY
Start: 2023-06-27

## 2023-07-06 NOTE — PROGRESS NOTES
Assessment/Plan:     Diagnoses and all orders for this visit:    Ingrown nail    Pain of right great toe    Other orders  -     escitalopram (LEXAPRO) 5 mg tablet; Take 5 mg by mouth daily           IMPRESSION:  · Right hallux onychodystrophy with medial and lateral ingrown nails     PLAN:  · I reviewed clinical exam with patient in detail today. I have discussed with the patient the pathophysiology of this diagnosis and reviewed how the examination correlates with this diagnosis. · Right hallux medial and lateral ingrown nails with pain. I am able to see the ends of the nails thus slant back nail trimming performed and pain resolved  · F/u PRN for PNA perm    Subjective:      Patient ID: Hayes Abrams is a 48 y.o. female. Emmanuel Sandoval presents to clinic today concerning right 1st toe ingrown nail. Notes her nails are chronically misshapen and she is normally able to trim the nail herself however recently there is a piece still left in the toe which she cannot remove. Notes pain. No redness nor purulence. The following portions of the patient's history were reviewed and updated as appropriate: allergies, current medications, past family history, past medical history, past social history, past surgical history and problem list.    Review of Systems   Constitutional: Negative for activity change, chills and fever. HENT: Negative. Respiratory: Negative for cough, chest tightness and shortness of breath. Cardiovascular: Negative for chest pain and leg swelling. Endocrine: Negative. Genitourinary: Negative. Musculoskeletal: Positive for gait problem (R 1st toe pain). Neurological: Negative for numbness. Psychiatric/Behavioral: Negative. Negative for agitation and behavioral problems. Objective:      Ht 5' 3" (1.6 m)   Wt 81.2 kg (179 lb)   BMI 31.71 kg/m²          Physical Exam  Constitutional:       General: She is not in acute distress. Appearance: Normal appearance.  She is not ill-appearing. Cardiovascular:      Pulses: Normal pulses. Comments: Bilateral DP & PT pulses 2/4. CRT WNL. Pedal hair present. Feet warm and well perfused. Pulmonary:      Effort: No respiratory distress. Musculoskeletal:         General: Tenderness (Right 1st toe lateral border) present. Comments: Bilateral ankle, STJ, TNJ, TMTJ, MTPJ ROM WNL and without pain. LE muscle strength WNL. Skin:     General: Skin is warm. Capillary Refill: Capillary refill takes less than 2 seconds. Findings: No erythema or lesion. Comments: Right 1st toenail with thickening and deep med/lateral borders. Pain to distal lateral border with ingrowth of nail   Neurological:      General: No focal deficit present. Mental Status: She is alert and oriented to person, place, and time. Sensory: No sensory deficit. Comments: Gross sensation intact. Denies N/T/B to B/L feet.     Psychiatric:         Mood and Affect: Mood normal.         Behavior: Behavior normal.

## 2023-07-10 ENCOUNTER — OFFICE VISIT (OUTPATIENT)
Dept: FAMILY MEDICINE CLINIC | Facility: CLINIC | Age: 53
End: 2023-07-10
Payer: COMMERCIAL

## 2023-07-10 VITALS
RESPIRATION RATE: 16 BRPM | SYSTOLIC BLOOD PRESSURE: 134 MMHG | OXYGEN SATURATION: 95 % | WEIGHT: 180.8 LBS | HEART RATE: 89 BPM | BODY MASS INDEX: 32.03 KG/M2 | DIASTOLIC BLOOD PRESSURE: 80 MMHG

## 2023-07-10 DIAGNOSIS — M94.0 TIETZE SYNDROME: ICD-10-CM

## 2023-07-10 DIAGNOSIS — I10 HYPERTENSION, UNSPECIFIED TYPE: ICD-10-CM

## 2023-07-10 DIAGNOSIS — K21.9 GASTROESOPHAGEAL REFLUX DISEASE WITHOUT ESOPHAGITIS: Primary | ICD-10-CM

## 2023-07-10 DIAGNOSIS — R13.10 DYSPHAGIA, UNSPECIFIED TYPE: ICD-10-CM

## 2023-07-10 DIAGNOSIS — M53.82 NECK MUSCLE WEAKNESS: ICD-10-CM

## 2023-07-10 DIAGNOSIS — H72.91 PERFORATION OF RIGHT TYMPANIC MEMBRANE: ICD-10-CM

## 2023-07-10 PROCEDURE — 99214 OFFICE O/P EST MOD 30 MIN: CPT | Performed by: FAMILY MEDICINE

## 2023-07-10 NOTE — PROGRESS NOTES
Assessment/Plan: Gastroesophageal reflux disease treated with Prilosec 20 mg daily with minimization of symptoms    Dysphagia the patient will be undergoing formal speech therapy swallow evaluation    Costochondritis currently in remission    Neck muscle weakness of uncertain etiology under the care of neurology. Neurology notes reviewed and appreciated    Hypertension with a blood pressure controlled on the current regimen    History of perforation of the right tympanic membrane    Problem List Items Addressed This Visit        Digestive    Acid reflux - Primary    Dysphagia       Cardiovascular and Mediastinum    High blood pressure       Nervous and Auditory    Perforation of tympanic membrane       Musculoskeletal and Integument    Tietze syndrome   Other Visit Diagnoses     Neck muscle weakness               Diagnoses and all orders for this visit:    Gastroesophageal reflux disease without esophagitis    Dysphagia, unspecified type    Tietze syndrome    Neck muscle weakness    Hypertension, unspecified type    Perforation of right tympanic membrane        No problem-specific Assessment & Plan notes found for this encounter. PHQ-2/9 Depression Screening    Little interest or pleasure in doing things: 0 - not at all  Feeling down, depressed, or hopeless: 0 - not at all          Body mass index is 32.03 kg/m². BMI Counseling: Body mass index is 32.03 kg/m². The BMI   Subjective:      Patient ID: Jaxon Villegas is a 48 y.o. female.     Patient presents for 6-month checkup      The following portions of the patient's history were reviewed and updated as appropriate:   She has a past medical history of Acid reflux, Anxiety, Anxiety, Arthritis (4/13/20), Bacterial vaginosis, Cleft palate, Costochondritis, DDD (degenerative disc disease), cervical, Frequency of urination, GERD (gastroesophageal reflux disease), High arches, HPV (human papilloma virus) anogenital infection, HPV (human papilloma virus) infection, Hyperhidrosis, Hyperlipidemia, Hypertension, Ingrown toenail, Perforated ear drum, Raynaud's disease, Tobacco dependence, Umbilical hernia, Vocal cords swelling, and Wears glasses. ,  does not have any pertinent problems on file. ,   has a past surgical history that includes Cleft palate repair; Tympanostomy tube placement; Tympanoplasty (Left); Cervical biopsy (N/A, 09/13/2021); and Toenail excision. ,  family history includes Cancer in her mother, paternal aunt, and sister; Heart disease in her father; Hypertension in her father; Lung cancer in her mother; Mental illness in her mother; No Known Problems in her maternal aunt, maternal grandfather, maternal grandmother, paternal grandfather, and paternal grandmother; Uterine cancer in her sister. ,   reports that she has been smoking cigarettes. She started smoking about 38 years ago. She has a 15.00 pack-year smoking history. She has never used smokeless tobacco. She reports that she does not currently use alcohol. She reports that she does not use drugs. ,  is allergic to flexeril [cyclobenzaprine] and medical tape. .  Current Outpatient Medications   Medication Sig Dispense Refill   • amLODIPine (NORVASC) 10 mg tablet Take 1 tablet (10 mg total) by mouth daily 90 tablet 3   • atorvastatin (LIPITOR) 10 mg tablet Take 1 tablet by mouth once daily 90 tablet 0   • celecoxib (CeleBREX) 200 mg capsule Take 1 capsule (200 mg total) by mouth 2 (two) times a day (Patient taking differently: Take 200 mg by mouth daily) 180 capsule 3   • diazepam (VALIUM) 2 mg tablet Take 1 tablet (2 mg total) by mouth every 6 (six) hours as needed for anxiety 30 tablet 0   • escitalopram (LEXAPRO) 5 mg tablet Take 5 mg by mouth daily     • hydroxychloroquine (PLAQUENIL) 200 mg tablet Take 400 mg by mouth daily at bedtime     • omeprazole (PriLOSEC) 20 mg delayed release capsule Take 1 capsule by mouth once daily 30 capsule 0     No current facility-administered medications for this visit. Review of Systems   Constitutional: Negative for chills and fever. HENT: Negative for ear pain and sore throat. Eyes: Negative for pain and visual disturbance. Respiratory: Negative for cough and shortness of breath. Cardiovascular: Negative for chest pain and palpitations. Gastrointestinal: Negative for abdominal pain and vomiting. Difficulty swallowing   Genitourinary: Negative for dysuria and hematuria. Musculoskeletal: Negative for arthralgias and back pain. Skin: Negative for color change and rash. Neurological: Positive for weakness. Negative for seizures and syncope. All other systems reviewed and are negative. Objective:    /80 (BP Location: Left arm, Patient Position: Sitting, Cuff Size: Standard)   Pulse 89   Resp 16   Wt 82 kg (180 lb 12.8 oz)   SpO2 95%   BMI 32.03 kg/m²   Body mass index is 32.03 kg/m². Physical Exam  Constitutional:       Appearance: Normal appearance. She is well-developed. HENT:      Head: Normocephalic and atraumatic. Right Ear: Tympanic membrane, ear canal and external ear normal.      Left Ear: Tympanic membrane, ear canal and external ear normal.      Nose: Nose normal.      Mouth/Throat:      Mouth: Mucous membranes are moist.      Pharynx: Oropharynx is clear. Eyes:      Extraocular Movements: Extraocular movements intact. Conjunctiva/sclera: Conjunctivae normal.      Pupils: Pupils are equal, round, and reactive to light. Cardiovascular:      Rate and Rhythm: Normal rate and regular rhythm. Pulses: Normal pulses. Heart sounds: Normal heart sounds. Pulmonary:      Effort: Pulmonary effort is normal.      Breath sounds: Normal breath sounds. Abdominal:      General: Abdomen is flat. Bowel sounds are normal.      Palpations: Abdomen is soft. Tenderness: There is no abdominal tenderness. Musculoskeletal:         General: Normal range of motion.       Cervical back: Normal range of motion and neck supple. Skin:     General: Skin is warm and dry. Capillary Refill: Capillary refill takes less than 2 seconds. Neurological:      General: No focal deficit present. Mental Status: She is alert and oriented to person, place, and time. Psychiatric:         Mood and Affect: Mood normal.         Behavior: Behavior normal.         Thought Content:  Thought content normal.         Judgment: Judgment normal.

## 2023-07-10 NOTE — PROGRESS NOTES
ADULT ANNUAL [address] [facility] PRIMARY CARE    NAME: David Ramírez  AGE: 48 y.o. SEX: female  : 1970     DATE: 7/10/2023     Assessment and Plan:     Problem List Items Addressed This Visit    None      Immunizations and preventive care screenings were discussed with patient today. Appropriate education was printed on patient's after visit summary. Counseling:  {Annual Physical; Counselin}         No follow-ups on file. Chief Complaint:     Chief Complaint   Patient presents with   • Follow-up     6 month   • discuss a couple issues     Nasal regurgitation? ? Myasthenia gravidas ??? History of Present Illness:     Adult Annual Physical   Patient here for a comprehensive physical exam. The patient reports {problems:34321}. Diet and Physical Activity  Diet/Nutrition: {annual physical; diet:57461787}. Exercise: {annual physical; exercise:65692260}. Depression Screening  PHQ-2/9 Depression Screening    Little interest or pleasure in doing things: 0 - not at all  Feeling down, depressed, or hopeless: 0 - not at all       General Health  Sleep: {annual physical; sleep:2102}. Hearing: {annual physical; hearin}. Vision: {annual physical; vision:}. Dental: {annual physical; dental:31681798}. /GYN Health  Patient is: {Menopause:30735}  Last menstrual period: ***  Contraceptive method: {contraceptive options:}.      Review of Systems:     Review of Systems   Past Medical History:     Past Medical History:   Diagnosis Date   • Acid reflux    • Anxiety    • Anxiety    • Arthritis 20    in the neck   • Bacterial vaginosis    • Cleft palate    • Costochondritis    • DDD (degenerative disc disease), cervical    • Frequency of urination    • GERD (gastroesophageal reflux disease)    • High arches    • HPV (human papilloma virus) anogenital infection    • HPV (human papilloma virus) infection • Hyperhidrosis    • Hyperlipidemia    • Hypertension    • Ingrown toenail    • Perforated ear drum    • Raynaud's disease    • Tobacco dependence    • Umbilical hernia    • Vocal cords swelling    • Wears glasses       Past Surgical History:     Past Surgical History:   Procedure Laterality Date   • CERVICAL BIOPSY N/A 09/13/2021    Procedure: CONE BIOPSY OF THE CERVIX;  Surgeon: Felipe Kennedy MD;  Location: [address] [place] [address];  Service: Gynecology   • CLEFT PALATE REPAIR     • TOENAIL EXCISION     • TYMPANOPLASTY Left    • TYMPANOSTOMY TUBE PLACEMENT        Social History:     Social History     Socioeconomic History   • Marital status: Single     Spouse name: None   • Number of children: None   • Years of education: None   • Highest education level: None   Occupational History   • Occupation:     Tobacco Use   • Smoking status: Every Day     Packs/day: 0.50     Years: 30.00     Total pack years: 15.00     Types: Cigarettes     Start date: 1985   • Smokeless tobacco: Never   • Tobacco comments:     04/18/2022-- max 1 pack per day   Vaping Use   • Vaping Use: Never used   Substance and Sexual Activity   • Alcohol use: Not Currently   • Drug use: Never   • Sexual activity: Yes   Other Topics Concern   • None   Social History Narrative    Smoke: 1 pack daily x 25 yrs - As per paper chart      Social Determinants of Health     Financial Resource Strain: Not on file   Food Insecurity: Not on file   Transportation Needs: Not on file   Physical Activity: Not on file   Stress: Not on file   Social Connections: Not on file   Intimate Partner Violence: Not on file   Housing Stability: Not on file      Family History:     Family History   Problem Relation Age of Onset   • Lung cancer Mother    • Mental illness Mother    • Cancer Mother         I believe it was lung cancer   • Heart disease Father    • Hypertension Father    • Uterine cancer Sister    • No Known Problems Maternal Grandmother    • No Known Problems Maternal Grandfather    • No Known Problems Paternal Grandmother    • No Known Problems Paternal Grandfather    • No Known Problems Maternal Aunt    • Cancer Paternal Aunt         unknown origin    • Cancer Sister       Current Medications:     Current Outpatient Medications   Medication Sig Dispense Refill   • amLODIPine (NORVASC) 10 mg tablet Take 1 tablet (10 mg total) by mouth daily 90 tablet 3   • atorvastatin (LIPITOR) 10 mg tablet Take 1 tablet by mouth once daily 90 tablet 0   • celecoxib (CeleBREX) 200 mg capsule Take 1 capsule (200 mg total) by mouth 2 (two) times a day (Patient taking differently: Take 200 mg by mouth daily) 180 capsule 3   • diazepam (VALIUM) 2 mg tablet Take 1 tablet (2 mg total) by mouth every 6 (six) hours as needed for anxiety 30 tablet 0   • escitalopram (LEXAPRO) 5 mg tablet Take 5 mg by mouth daily     • hydroxychloroquine (PLAQUENIL) 200 mg tablet Take 400 mg by mouth daily at bedtime     • omeprazole (PriLOSEC) 20 mg delayed release capsule Take 1 capsule by mouth once daily 30 capsule 0     No current facility-administered medications for this visit. Allergies:      Allergies   Allergen Reactions   • Flexeril [Cyclobenzaprine] Other (See Comments)     Patient reported "Dry Mouth"   • Medical Tape Hives      Physical Exam:     /80 (BP Location: Left arm, Patient Position: Sitting, Cuff Size: Standard)   Pulse 89   Resp 16   Wt 82 kg (180 lb 12.8 oz)   SpO2 95%   BMI 32.03 kg/m²     Physical Exam     [facility] PR[facility]

## 2023-07-12 ENCOUNTER — TELEPHONE (OUTPATIENT)
Dept: NEUROLOGY | Facility: CLINIC | Age: 53
End: 2023-07-12

## 2023-07-15 DIAGNOSIS — K21.9 GASTROESOPHAGEAL REFLUX DISEASE: ICD-10-CM

## 2023-07-15 RX ORDER — OMEPRAZOLE 20 MG/1
CAPSULE, DELAYED RELEASE ORAL
Qty: 30 CAPSULE | Refills: 0 | OUTPATIENT
Start: 2023-07-15

## 2023-07-18 ENCOUNTER — TELEPHONE (OUTPATIENT)
Dept: NEUROLOGY | Facility: CLINIC | Age: 53
End: 2023-07-18

## 2023-07-18 NOTE — TELEPHONE ENCOUNTER
Called and LVM for patient regarding rescheduling EMG appt w/ Dr. Margo Payne. Expecting a call back.

## 2023-07-26 ENCOUNTER — TELEPHONE (OUTPATIENT)
Dept: NEUROLOGY | Facility: CLINIC | Age: 53
End: 2023-07-26

## 2023-07-28 DIAGNOSIS — K21.9 GASTROESOPHAGEAL REFLUX DISEASE: ICD-10-CM

## 2023-07-28 RX ORDER — OMEPRAZOLE 20 MG/1
CAPSULE, DELAYED RELEASE ORAL
Qty: 30 CAPSULE | Refills: 0 | Status: SHIPPED | OUTPATIENT
Start: 2023-07-28

## 2023-08-08 ENCOUNTER — OFFICE VISIT (OUTPATIENT)
Dept: GASTROENTEROLOGY | Facility: MEDICAL CENTER | Age: 53
End: 2023-08-08
Payer: COMMERCIAL

## 2023-08-08 VITALS
TEMPERATURE: 98.7 F | WEIGHT: 183.4 LBS | BODY MASS INDEX: 32.49 KG/M2 | SYSTOLIC BLOOD PRESSURE: 126 MMHG | DIASTOLIC BLOOD PRESSURE: 74 MMHG | OXYGEN SATURATION: 99 % | HEART RATE: 82 BPM

## 2023-08-08 DIAGNOSIS — R13.19 OTHER DYSPHAGIA: ICD-10-CM

## 2023-08-08 PROCEDURE — 99243 OFF/OP CNSLTJ NEW/EST LOW 30: CPT | Performed by: NURSE PRACTITIONER

## 2023-08-08 RX ORDER — MELATONIN
1000 DAILY
COMMUNITY

## 2023-08-08 NOTE — PROGRESS NOTES
[facility] - Outpatient Consultation  Marc Andres 48 y.o. female MRN: [id number]  Encounter: [id number]          ASSESSMENT AND PLAN:    Marc Andres is a 48 y.o. female who presents with complaint of dysphagia and GERD. 1.  Dysphagia  2. GERD    Started in April with drooping right eyelid, increased saliva, fatigue after talking, singing and eating as well as upper extremity fatigue/weakness. She also noted at this time a "tightness" around her neck and dysphagia with solids. She is currently being followed by neurology. Reporting that it is suspected that she may have myasthenia gravis. She is currently waiting for other testing. ALS and MS ruled out per patient. She reports bouts of "clearing of throat and increased saliva" since April 2023. She denies any coughing or choking. She is able to tolerate liquids in small amounts and solids that are small with frequent meals. She reports a "sensation" in her neck and upper chest at times when she is swallowing. Food and liquids advance as long as she is drinking and eating small amounts at 1 time. Reports she has lost about 20 pounds since 4/23 as she has altered her diet. History of GERD which is controlled with omeprazole 20 mg daily. She has never had an EGD. She did undergo a speech eval which is not available during visit today. She was born with a cleft palate. Was told per speech therapist small/frequent meals and follow-up with GI.    -Barium swallow to assess motility of esophagus  -EGD in hospital setting  -Continue omeprazole 20 mg daily  -Antireflux diet  -Follow-up after testing    I reviewed with patient/family potential risks of endoscopic evaluation including possible infection, bleeding or perforation. Patient/family verbalized understanding of potential risks and agreed to procedure(s).     - Ambulatory Referral to Gastroenterology ______________________________________________________________________    HPI:    Rody Antonio is a 48 y.o. female who presents with complaint of dysphagia and GERD. She has a past medical history of GERD, vocal cord ulcer, HTN, Raynaud's disease, Tietze syndrome, foraminal stenosis of the cervical region, fibroids. Started in April with drooping right eyelid, increased saliva, fatigue after talking, singing and eating as well as upper extremity fatigue/weakness. She also noted at this time a "tightness" around her neck and dysphagia with solids. She is currently being followed by neurology. Reporting that it is suspected that she may have myasthenia gravis. She is currently waiting for other testing. ALS and MS ruled out per patient. She reports bouts of "clearing of throat and increased saliva" since April 2023. She denies any coughing or choking. She is able to tolerate liquids in small amounts and solids that are small with frequent meals. Reports she has lost about 20 pounds since 4/23 as she has altered her diet. History of GERD which is controlled with omeprazole 20 mg daily. She has never had an EGD. She did undergo a speech eval which is not available during visit today. She was born with a cleft palate. Was told per speech therapist small/frequent meals and follow-up with GI. BMs are brown and formed daily. Denies any melena or hematochezia. Denies any family history of any colon cancers or polyps. She has never had a colonoscopy. We discussed setting up colonoscopy with EGD but patient would like to hold at this time to expedite EGD and determine neurologic diagnosis. Recent labs 4/23-CMP normal, CBC normal      REVIEW OF SYSTEMS:    CONSTITUTIONAL: Denies any fever, chills, rigors, and weight loss. HEENT: No earache or tinnitus. Denies hearing loss or visual disturbances. CARDIOVASCULAR: No chest pain or palpitations.    RESPIRATORY: Denies any cough, hemoptysis, shortness of breath or dyspnea on exertion. GASTROINTESTINAL: As noted in the History of Present Illness. GENITOURINARY: No problems with urination. Denies any hematuria or dysuria. NEUROLOGIC: No dizziness or vertigo, denies headaches. MUSCULOSKELETAL: Denies any muscle or joint pain. SKIN: Denies skin rashes or itching. ENDOCRINE: Denies excessive thirst. Denies intolerance to heat or cold. PSYCHOSOCIAL: Denies depression or anxiety. Denies any recent memory loss.        Historical Information   Past Medical History:   Diagnosis Date   • Acid reflux    • Anxiety    • Anxiety    • Arthritis 4/13/20    in the neck   • Bacterial vaginosis    • Cleft palate    • Costochondritis    • DDD (degenerative disc disease), cervical    • Frequency of urination    • GERD (gastroesophageal reflux disease)    • High arches    • HPV (human papilloma virus) anogenital infection    • HPV (human papilloma virus) infection    • Hyperhidrosis    • Hyperlipidemia    • Hypertension    • Ingrown toenail    • Perforated ear drum    • Raynaud's disease    • Tobacco dependence    • Umbilical hernia    • Vocal cords swelling    • Wears glasses      Past Surgical History:   Procedure Laterality Date   • CERVICAL BIOPSY N/A 09/13/2021    Procedure: CONE BIOPSY OF THE CERVIX;  Surgeon: Enrique Esteves MD;  Location: [facility] MAIN OR;  Service: Gynecology   • CLEFT PALATE REPAIR     • TOENAIL EXCISION     • TYMPANOPLASTY Left    • TYMPANOSTOMY TUBE PLACEMENT       Social History   Social History     Substance and Sexual Activity   Alcohol Use Not Currently     Social History     Substance and Sexual Activity   Drug Use Never     Social History     Tobacco Use   Smoking Status Every Day   • Packs/day: 1.00   • Years: 30.00   • Total pack years: 30.00   • Types: Cigarettes   • Start date: 1/1/1985   Smokeless Tobacco Never   Tobacco Comments    04/18/2022-- max 1 pack per day     Family History   Problem Relation Age of Onset   • Lung cancer Mother    • Mental illness Mother    • Cancer Mother         I believe it was lung cancer   • Early death Mother         Passed away 3/4/98 (47 yrs old)   • Miscarriages / Castine Mother    • Heart disease Father    • Hypertension Father    • Early death Father         Passed away 8/27/07 (78 yrs old)   • Uterine cancer Sister    • No Known Problems Maternal Grandmother    • No Known Problems Maternal Grandfather    • No Known Problems Paternal Grandmother    • No Known Problems Paternal Grandfather    • No Known Problems Maternal Aunt    • Cancer Paternal Aunt         unknown origin    • Cancer Sister    • Stroke Sister    • Miscarriages / Castine Sister        Meds/Allergies       Current Outpatient Medications:   •  amLODIPine (NORVASC) 10 mg tablet  •  atorvastatin (LIPITOR) 10 mg tablet  •  celecoxib (CeleBREX) 200 mg capsule  •  cholecalciferol (VITAMIN D3) 1,000 units tablet  •  escitalopram (LEXAPRO) 5 mg tablet  •  hydroxychloroquine (PLAQUENIL) 200 mg tablet  •  omeprazole (PriLOSEC) 20 mg delayed release capsule  •  diazepam (VALIUM) 2 mg tablet    Allergies   Allergen Reactions   • Flexeril [Cyclobenzaprine] Other (See Comments)     Patient reported "Dry Mouth"   • Medical Tape Hives           Objective     Blood pressure 126/74, pulse 82, temperature 98.7 °F (37.1 °C), temperature source Tympanic, weight 83.2 kg (183 lb 6.4 oz), SpO2 99 %. Body mass index is 32.49 kg/m². PHYSICAL EXAM:      General Appearance:   Alert, cooperative, no distress   HEENT:   Normocephalic, atraumatic, anicteric. Neck:  Supple, symmetrical, trachea midline   Lungs:   Clear to auscultation bilaterally; no rales, rhonchi or wheezing; respirations unlabored    Heart[de-identified]   Regular rate and rhythm; no murmur, rub, or gallop.    Abdomen:   Soft, non-tender, non-distended; normal bowel sounds; no masses, no organomegaly    Genitalia:   Deferred    Rectal:   Deferred    Extremities:  No cyanosis, clubbing or edema Pulses:  2+ and symmetric    Skin:  No jaundice, rashes, or lesions    Lymph nodes:  No palpable cervical lymphadenopathy        Lab Results:   No visits with results within 1 Day(s) from this visit. Latest known visit with results is:   Appointment on 06/14/2023   Component Date Value   • Total CK 06/14/2023 51    • AChR Binding Ab, Serum 06/14/2023 <0.03    • AChR Blocking Abs, Serum 06/14/2023 20    • AChR Modulating Abs 06/14/2023 0        Lab Results   Component Value Date    WBC 6.61 04/19/2023    HGB 15.1 04/19/2023    HCT 43.6 04/19/2023    MCV 90 04/19/2023     04/19/2023       Lab Results   Component Value Date     04/01/2015    SODIUM 141 04/19/2023    K 3.7 04/19/2023     04/19/2023    CO2 26 04/19/2023    ANIONGAP 8 04/01/2015    AGAP 9 04/19/2023    BUN 9 04/19/2023    CREATININE 0.82 04/19/2023    GLUC 96 04/19/2023    GLUF 100 (H) 02/20/2023    CALCIUM 9.9 04/19/2023    AST 16 04/19/2023    ALT 14 04/19/2023    ALKPHOS 92 04/19/2023    PROT 6.9 04/01/2015    TP 7.0 04/19/2023    BILITOT 0.2 04/01/2015    TBILI 0.43 04/19/2023    EGFR 81 04/19/2023       Lab Results   Component Value Date    CRP <0.5 04/09/2020       Lab Results   Component Value Date    LID2GYCTVTBY 2.350 02/20/2023       No results found for: "IRON", "TIBC", "FERRITIN"    Radiology Results:   No results found.

## 2023-08-08 NOTE — PATIENT INSTRUCTIONS
Moderate Sedation   AMBULATORY CARE:   What you need to know about moderate sedation:  Moderate sedation, or conscious sedation, is medicine used during procedures to help you feel relaxed and calm. You will be awake and able to follow directions without anxiety or pain. You will remember little to none of the procedure. Moderate sedation can be used for procedures such as a colonoscopy, wound repair, cataract removal, or dental work. The medicine is given as a pill, shot, inhaled solution, or injection through an IV. How to prepare for moderate sedation:  Your healthcare provider will talk to you about how to prepare for moderate sedation. You may be told not to eat or drink anything for 8 hours before moderate sedation. You may be able to drink clear liquids up until 2 hours before moderate sedation. Tell healthcare providers if you have any allergies, heart problems, or breathing problems. Arrange for someone to drive you home and stay with you for 24 hours. You may feel sleepy and need help doing things at home. Another person may need to call 911 if you cannot be woken. What will happen during moderate sedation:  Your healthcare provider will give you enough medicine to keep you relaxed and calm. Your healthcare provider will monitor your blood pressure, heart rate, and breathing. You will be on a heart monitor and a pulse oximeter. A heart monitor is a safety device that stays on continuously to record your heart's electrical activity. A pulse oximeter is a device that measures the amount of oxygen in your blood. You may get oxygen through a mask placed over your nose and mouth or through small tubes placed in your nostrils. What will happen after moderate sedation:  Healthcare providers will monitor you until you are awake. You may need extra oxygen if your blood oxygen level is lower than it should be. Ask your healthcare provider before you take off the mask or oxygen tubing.  You may be able to go home when you are alert and can stand up. This may take 1 to 2 hours after you have received moderate sedation. You may feel tired, weak, or unsteady on your feet after you get sedation. You may also have trouble concentrating or short-term memory loss. These symptoms should go away in 24 hours or less. Risks of moderate sedation:   You may get a headache or nausea from the medicine. You may have problems with your short-term memory. Your skin may itch or your eyes may water. You may not get enough sedation, or it may wear off quickly. You may feel restless during the procedure or as you wake up. Too much medicine can cause deep sedation. Your healthcare provider may have trouble waking you, and you may need medicine to help you wake up. Your breathing may not be regular, or it may stop. You may need a ventilator to help you breathe. Your risk for problems with sedation is higher if you have heart or lung disease, a head injury, or drink alcohol. Call 911 or have someone else call for any of the following: You have sudden trouble breathing. You cannot be woken. Seek care immediately if:   You have a severe headache or dizziness. Your heart is beating faster than usual.    Contact your healthcare provider if:   You have a fever. You have nausea or are vomiting for more than 8 hours after the procedure. Your skin is itchy, swollen, or you have a rash. You have questions or concerns about your condition or care. Self-care:   Have someone stay with you for 24 hours. This person can drive you to errands and help you do things around the house. This person can also watch for problems. Rest and do quiet activities for 24 hours. Do not exercise, ride a bike, or play sports. Stand up slowly to prevent dizziness and falls. Take short walks around the house with another person. Slowly return to your usual activities the next day. Do not drive or use dangerous machines or tools for 24 hours. You may injure yourself or others. Examples include a lawnmower, saw, or drill. Do not return to work for 24 hours if you use dangerous machines or tools for work. Do not make important decisions for 24 hours. For example, do not sign important papers or invest money. Drink liquids as directed. Liquids help flush the sedation medicine out of your body. Ask how much liquid to drink each day and which liquids are best for you. Eat small, frequent meals to prevent nausea and vomiting. Start with clear liquids such as juice or broth. If you do not vomit after clear liquids, you can eat your usual foods. Do not drink alcohol or take medicines that make you drowsy. This includes medicines that help you sleep and anxiety medicines. Ask your healthcare provider if it is safe for you to take pain medicine. Follow up with your healthcare provider as directed:  Write down your questions so you remember to ask them during your visits. © Copyright [employer] 2022 Information is for End User's use only and may not be sold, redistributed or otherwise used for commercial purposes. The above information is an  only. It is not intended as medical advice for individual conditions or treatments. Talk to your doctor, nurse or pharmacist before following any medical regimen to see if it is safe and effective for you. Upper Endoscopy   AMBULATORY CARE:   What you need to know about an upper endoscopy: An upper endoscopy is also called an upper gastrointestinal (GI) endoscopy, or an esophagogastroduodenoscopy (EGD). A scope (thin, flexible tube with a light and camera) is used to examine the walls of your upper digestive tract. The upper digestive tract includes the esophagus, stomach, and duodenum (first part of the small intestine). An upper endoscopy is used to look for problems, such as bleeding, polyps, ulcers, or infection. How to prepare for an upper endoscopy:   Your healthcare provider will talk to you about how to prepare for your procedure. You may be told not eat or drink anything except water for 6 to 12 hours before the procedure. Your provider will tell you which medicines to take or not take on the day of your procedure. Arrange to have someone drive you home. What will happen during an upper endoscopy: You will be given medicine through your IV to help you relax and make you drowsy. You will also be given medicine to numb your throat. You may need to wear a plastic mouthpiece to help hold your mouth open and protect your teeth and tongue. Your provider will gently insert the endoscope through your mouth and down into your throat. You may be asked to swallow to help move the scope. You may feel pressure in your throat, but you should not feel pain. The endoscope does not restrict your breathing. Your provider will watch the scope on a monitor and take pictures with the scope. Your provider may gently inject air to make it easier to see your digestive tract clearly. Your provider may take tissue samples and send them to a lab for tests. Foreign objects, tumors, or polyps that may be blocking your digestive tract may be removed. Your provider may also insert tools with the scope to treat bleeding or place a stent (tube). What to expect after an upper endoscopy: You may feel bloated, gassy, or have some abdominal discomfort. Your throat may be sore for 24 to 36 hours after the procedure. You may burp or pass gas from air that is still inside your body after your procedure. You may need to take short walks to help move the gas out. Eat small meals, if you feel bloated. Do not drive or make important decisions until the day after your procedure. Risks of an upper endoscopy: Your esophagus, stomach, or duodenum may be punctured or torn during the procedure. This is because of increased pressure as the scope and air are passing through.  You may bleed more than expected or get an infection. You may have a slow or irregular heartbeat, or low blood pressure. This can cause sweating and fainting. Fluid may enter your lungs and you may have trouble breathing. These problems can be life-threatening. Call 911 if:   You have sudden chest pain or trouble breathing. Seek care immediately if:   You feel dizzy or faint. You have trouble swallowing. You have severe throat pain. Your bowel movements are very dark or black. Your abdomen is hard and firm and you have severe pain. You vomit blood. Contact your healthcare provider if:   You feel full or bloated and cannot burp or pass gas. You have not had a bowel movement for 3 days after your procedure. You have neck pain. You have a fever or chills. You have nausea or are vomiting. You have a rash or hives. You have questions or concerns about your endoscopy. Relieve a sore throat:  Suck on throat lozenges or crushed ice. Gargle with a small amount of warm salt water. Mix 1 teaspoon of salt and 1 cup of warm water to make salt water. Relieve gas and discomfort from bloating:  Lie on your right side with a heating pad on your abdomen. Take short walks to help pass gas. Eat small meals until bloating is relieved. Rest after your procedure: You have been given medicine to relax you. Do not  drive or make important decisions until the day after your procedure. Return to your normal activity as directed. You can usually return to work the day after your procedure. Follow up with your healthcare provider as directed:  Write down your questions so you remember to ask them during your visits. © Copyright [employer] 2022 Information is for End User's use only and may not be sold, redistributed or otherwise used for commercial purposes. The above information is an  only. It is not intended as medical advice for individual conditions or treatments.  Talk to your doctor, nurse or pharmacist before following any medical regimen to see if it is safe and effective for you.

## 2023-08-15 ENCOUNTER — HOSPITAL ENCOUNTER (OUTPATIENT)
Dept: RADIOLOGY | Facility: HOSPITAL | Age: 53
Discharge: HOME | End: 2023-08-15
Payer: COMMERCIAL

## 2023-08-15 DIAGNOSIS — R13.19 OTHER DYSPHAGIA: ICD-10-CM

## 2023-08-15 PROCEDURE — 74220 X-RAY XM ESOPHAGUS 1CNTRST: CPT

## 2023-08-17 ENCOUNTER — TELEPHONE (OUTPATIENT)
Age: 53
End: 2023-08-17

## 2023-08-17 NOTE — TELEPHONE ENCOUNTER
I spoke with the patient today. She is concerned about having her EGD before she gets a potential diagnosis for myasthenia gravis. I reassured her that we could reschedule her EGD since she is doing okay at this time for after her testing 8/18/23. Could we please call patient and reschedule her EGD. Thank you.

## 2023-08-17 NOTE — TELEPHONE ENCOUNTER
Patients GI provider:  Kevin RODGERS    Number to return call: [phone]    Reason for call: Pt calling because she would like to speak with Shavonne Mejias to determine if she needs to have the EGD done.  She does not want to speak with a nurse    Scheduled procedure/appointment date if applicable: Procedure 7/4/81

## 2023-08-23 DIAGNOSIS — K21.9 GASTROESOPHAGEAL REFLUX DISEASE: ICD-10-CM

## 2023-08-23 RX ORDER — OMEPRAZOLE 20 MG/1
CAPSULE, DELAYED RELEASE ORAL
Qty: 30 CAPSULE | Refills: 5 | Status: SHIPPED | OUTPATIENT
Start: 2023-08-23

## 2023-09-28 DIAGNOSIS — E78.5 DYSLIPIDEMIA: ICD-10-CM

## 2023-09-28 RX ORDER — ATORVASTATIN CALCIUM 10 MG/1
TABLET, FILM COATED ORAL
Qty: 90 TABLET | Refills: 0 | Status: SHIPPED | OUTPATIENT
Start: 2023-09-28

## 2023-10-14 DIAGNOSIS — I10 HYPERTENSION, UNSPECIFIED TYPE: ICD-10-CM

## 2023-10-15 RX ORDER — AMLODIPINE BESYLATE 10 MG/1
10 TABLET ORAL DAILY
Qty: 90 TABLET | Refills: 1 | Status: SHIPPED | OUTPATIENT
Start: 2023-10-15

## 2023-10-23 ENCOUNTER — VBI (OUTPATIENT)
Dept: ADMINISTRATIVE | Facility: OTHER | Age: 53
End: 2023-10-23

## 2023-11-07 DIAGNOSIS — R20.2 PARESTHESIA: Primary | ICD-10-CM

## 2023-11-09 ENCOUNTER — TELEPHONE (OUTPATIENT)
Dept: NEUROLOGY | Facility: CLINIC | Age: 53
End: 2023-11-09

## 2023-11-09 NOTE — TELEPHONE ENCOUNTER
Lmom for patient to call me back to find out if she is going to come for her appointment tomorrow because she just saw [facility] on 11/6/23 for weakness and paresthesia, so I just want to make sure that she is going to either see use of [facility], I left my direct number for her to call me back.

## 2023-11-10 ENCOUNTER — VBI (OUTPATIENT)
Dept: ADMINISTRATIVE | Facility: OTHER | Age: 53
End: 2023-11-10

## 2023-12-27 DIAGNOSIS — E78.5 DYSLIPIDEMIA: ICD-10-CM

## 2023-12-27 RX ORDER — ATORVASTATIN CALCIUM 10 MG/1
TABLET, FILM COATED ORAL
Qty: 90 TABLET | Refills: 0 | Status: SHIPPED | OUTPATIENT
Start: 2023-12-27

## 2024-01-11 ENCOUNTER — TELEPHONE (OUTPATIENT)
Dept: FAMILY MEDICINE CLINIC | Facility: CLINIC | Age: 54
End: 2024-01-11

## 2024-01-16 ENCOUNTER — OFFICE VISIT (OUTPATIENT)
Dept: RHEUMATOLOGY | Facility: CLINIC | Age: 54
End: 2024-01-16
Payer: COMMERCIAL

## 2024-01-16 VITALS
SYSTOLIC BLOOD PRESSURE: 126 MMHG | WEIGHT: 202.8 LBS | OXYGEN SATURATION: 98 % | HEART RATE: 92 BPM | BODY MASS INDEX: 35.93 KG/M2 | HEIGHT: 63 IN | DIASTOLIC BLOOD PRESSURE: 70 MMHG

## 2024-01-16 DIAGNOSIS — Z79.899 HIGH RISK MEDICATION USE: ICD-10-CM

## 2024-01-16 DIAGNOSIS — I73.00 RAYNAUD'S DISEASE WITHOUT GANGRENE: ICD-10-CM

## 2024-01-16 DIAGNOSIS — M79.10 MYALGIA: ICD-10-CM

## 2024-01-16 DIAGNOSIS — Z79.1 NSAID LONG-TERM USE: ICD-10-CM

## 2024-01-16 DIAGNOSIS — M94.0 TIETZE SYNDROME: ICD-10-CM

## 2024-01-16 DIAGNOSIS — I10 HYPERTENSION, UNSPECIFIED TYPE: ICD-10-CM

## 2024-01-16 DIAGNOSIS — M62.838 MUSCLE SPASM: Primary | ICD-10-CM

## 2024-01-16 PROCEDURE — 99214 OFFICE O/P EST MOD 30 MIN: CPT | Performed by: INTERNAL MEDICINE

## 2024-01-16 RX ORDER — ESCITALOPRAM OXALATE 5 MG/1
5 TABLET ORAL DAILY
Qty: 30 TABLET | Refills: 6 | Status: SHIPPED | OUTPATIENT
Start: 2024-01-16

## 2024-01-16 RX ORDER — PYRIDOSTIGMINE BROMIDE 60 MG/1
30 TABLET ORAL 3 TIMES DAILY
COMMUNITY
Start: 2024-01-09 | End: 2025-01-08

## 2024-01-16 RX ORDER — HYDROXYCHLOROQUINE SULFATE 200 MG/1
400 TABLET, FILM COATED ORAL
Qty: 180 TABLET | Refills: 1 | Status: SHIPPED | OUTPATIENT
Start: 2024-01-16 | End: 2024-07-14

## 2024-01-16 RX ORDER — CELECOXIB 100 MG/1
100 CAPSULE ORAL 2 TIMES DAILY
Qty: 60 CAPSULE | Refills: 6 | Status: SHIPPED | OUTPATIENT
Start: 2024-01-16

## 2024-01-16 RX ORDER — AMLODIPINE BESYLATE 10 MG/1
10 TABLET ORAL DAILY
Qty: 90 TABLET | Refills: 1 | Status: SHIPPED | OUTPATIENT
Start: 2024-01-16

## 2024-01-16 NOTE — PROGRESS NOTES
Assessment and Plan:  Helen Ware is a 53 y.o.  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 Had a really bad flare-up last fall. Patient saw Dr. Frost at [facility] for a second opinion regarding her condition; was diagnosed with inflammatory arthritis and started on hydroxychloroqine.  Patient admits to a lot of cramping, twitching, facial pain, trouble swallowing, and choking sensation.  She does admit to stiffness in her hands in the morning.  It is worth doing a mysoitis workup to rule it out.    Cubital tunnel syndrome.  She was recommended to do cubital tunnel syndrome exercises.    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  Will order a myositis panel. Will check her thyroid. Provided medication refill for her Celebrex 100 mg twice a day and hydroxychloroquine.    Continue escitalopram 5mg daily  Continue hydroxychloroquine 2 tabs daily; get regular eye exams  Continue amlodipine 10mg daily for blood pressure and Raynaud's symptoms  Restart celecoxib 100mg twice a day as needed for pain  Do labs    Return to clinic in 6 months    Plan:  1. Muscle spasm    2. Myalgia  -     MyoMarker 3 Plus Profile (RDL)  -     TSH, 3rd generation with Free T4 reflex  -     hydroxychloroquine (PLAQUENIL) 200 mg tablet; Take 2 tablets (400 mg total) by mouth daily at bedtime  -     escitalopram (LEXAPRO) 5 mg tablet; Take 1 tablet (5 mg total) by mouth daily  -     celecoxib (CeleBREX) 100 mg capsule; Take 1 capsule (100 mg total) by mouth 2 (two) times a day As needed for joint pain    3. Tietze syndrome  -     hydroxychloroquine (PLAQUENIL) 200 mg tablet; Take 2 tablets (400 mg total) by mouth daily at bedtime  -     celecoxib (CeleBREX) 100 mg capsule; Take 1 capsule (100 mg total) by mouth 2 (two) times a day As needed for joint pain    4. Hypertension, unspecified type  -     amLODIPine (NORVASC) 10 mg tablet; Take 1 tablet (10 mg total) by mouth daily    5. Raynaud's disease without  gangrene    6. NSAID long-term use    7. High risk medication use    High risk medication use - Benefits and risks of hydroxychloroquine, including but not limited to retinal toxicity, corneal deposits, gastrointestinal side effects, and headaches were discussed with the patient. The need for a regular eye exam to monitor for ocular toxicity while on this medication was also explained to the patient.     RTC in 6 months      Chief Complaint  Follow-up visit for Tietze syndrome    Rheumatic Disease Summary  Last visit 9/27/22: Patient presents for follow-up of Tietze syndrome (a condition that presents with sternoclavicular swelling and tenderness), which continue to be controlled on celecoxib 100mg po bid prn, and Raynaud's syndrome.   Take cyclobenzaprine at bedtime for muscle pain, especially in neck  Continue amlodipine daily for blood pressure and Raynaud's symptoms  Continue celecoxib 100mg twice a day as needed for pain    HPI  Helen Ware  is a 53-year-old female who is here for follow-up of Tietze syndrome and Raynaud's syndome. She is accompanied by her partner.    She is seeking for a second opinion that will address her symptoms as well as in managing with Tietze syndrome. She experiences severe flare-ups of facial pain, tightness, twitching, and cramping. Initially at [facility], she was put on hydroxychloroquine by Dr. Frost. She was already on Celebrex when she had the flare-up. At some point, she had considered discontinuing Celebrex however due to increased symptoms, she resumed taking the original dosage of 100 mg. She has been alternating in between medications and is uncertain about the effectiveness of hydroxychloroquine, if it is Celebrex or both medications combined. She did some relaxation and avoid lifting as she learned when her condition resurfaced.     Several symptoms started to happen in 04/2023. Her legs twitch and had severe cramps and she constantly trying to stretch her legs.  The twitching is worse when she is sedentary. She described the last flare-up as worse than she had before 3 years ago when it initially. She had facial pain this past year, dysphagia, trouble chewing, and weakness which she perceived as myasthenia gravis, however was informed by the neurologist that she does not have MG when being tested and the result came back negative. However, her symptoms got worse and have been going on for a year. She recalled having no any weakness in the arms and legs with her last visit. She has gotten additional symptoms in the last month including facial pain.  She also had flare-ups in June or July. She is unsure whether she received steroids injection to help calm down the symptoms.     Currently she is being very cautious with what she lifts. Her throat is in good condition at present. Although she gets weak, her voice and diaphragm get weak when she is straining her voice or if she overeats. She can not sing as high or play the keyboard as long as she used to. Any muscle she engages experiences fatigued and does not get better when she tries to rest it. Compared 3 years ago, if she lifts a heavy bottle of soda, immediately feels the symptoms but when she stops, it goes away.    At present, the sensation of being strangulated, the twitching, cramping, and weakness gives her discomfort and more of her concern. She denies chest pain. The twitching and cramping and muscles weakness is in her legs, arms, and shoulders. She even had some twitching in her face, which lasted about a week. She saw a neurologist, who noticed that her eye was drooping which prompted to test MG. She believes that the facial pain must have been from the Tietze syndrome flare-up. Her primary care doctor thought she had TMJ and sent her for physical therapy. After physical therapy, her muscle weakness symptoms started recurring. She was found to have a herniated disc bulging. She has been experiencing morning hand  stiffness and soreness for at least a month. She also reports muscle fasciculations, cold extremities  particularly in the hands that occurs mostly at bedtime, and persistent internal muscle tremors. She saw the neuromuscular doctor in 11/2023 and will follow up on 03/04/2024. She has rash on her foot.    She had a repetitive nerve test, which came back normal. She had an upper EMG that showed cubital tunnel syndrome which she admits having discomfort in the elbows. She never went for the lower extremity EMG, but she is going tomorrow at [facility].    On the other note, she has noticed loss of balance and incontinence in the past month. She had been experiencing intermittent urinary incontinence for several years. She frequently feels a sudden urge to urinate and often fails to reach the bathroom in time, particularly during the night.    Few months ago she has issues with her mental health. Her primary care doctor put her on Valium. She discontinued her treatment once she started feeling better, amidst numerous life changes.     Her neurologist gave her Lexapro 5 mg. She was on Lexapro for a while.    She is on Celebrex 200 mg once a day and hydroxychloroquine 2 tablets at bedtime. She wanted to have the 100mg twice daily. She takes Tylenol for headaches.    Her neck is not bothering her enough to take more Flexeril.    She is taking amlodipine 10 mg for her Raynaud's symptoms.    She smokes more than she drinks water. She is Claustrophobic.     Review of Systems:   Pertinent ROS positive for chills, fatigue, headaches, sinus problems, urine frequency, joint pain, neck pain, muscle pain, rashes, weakness, numbness, polydipsia, cold intolerance, and chest pain. Rest of 14 point ROS reviewed and were negative.    Home Medications:    Current Outpatient Medications:     amLODIPine (NORVASC) 10 mg tablet, Take 1 tablet (10 mg total) by mouth daily, Disp: 90 tablet, Rfl: 1    atorvastatin (LIPITOR) 10 mg  "tablet, Take 1 tablet by mouth once daily, Disp: 90 tablet, Rfl: 0    celecoxib (CeleBREX) 100 mg capsule, Take 1 capsule (100 mg total) by mouth 2 (two) times a day As needed for joint pain, Disp: 60 capsule, Rfl: 6    cholecalciferol (VITAMIN D3) 1,000 units tablet, Take 1,000 Units by mouth daily, Disp: , Rfl:     escitalopram (LEXAPRO) 5 mg tablet, Take 1 tablet (5 mg total) by mouth daily, Disp: 30 tablet, Rfl: 6    hydroxychloroquine (PLAQUENIL) 200 mg tablet, Take 2 tablets (400 mg total) by mouth daily at bedtime, Disp: 180 tablet, Rfl: 1    omeprazole (PriLOSEC) 20 mg delayed release capsule, Take 1 capsule by mouth once daily, Disp: 30 capsule, Rfl: 5    pyridostigmine (MESTINON) 60 mg tablet, Take 30 mg by mouth Three times a day, Disp: , Rfl:     diazepam (VALIUM) 2 mg tablet, Take 1 tablet (2 mg total) by mouth every 6 (six) hours as needed for anxiety (Patient not taking: Reported on 8/8/2023), Disp: 30 tablet, Rfl: 0    Objective:    Vitals:    01/16/24 1548   BP: 126/70   Pulse: 92   SpO2: 98%   Weight: 92 kg (202 lb 12.8 oz)   Height: 5' 3\" (1.6 m)       Physical Exam:  Constitutional:    General: She is not in acute distress.  HENT:   Head: Normocephalic and atraumatic.   Eyes:   Conjunctiva/sclera: Conjunctivae normal.   Cardiovascular:   Rate and Rhythm: Normal rate and regular rhythm.   Heart sounds: S1 normal and S2 normal.   No friction rub.   Pulmonary:   Effort: Pulmonary effort is normal. No respiratory distress.   Breath sounds: Normal breath sounds. No wheezing, rhonchi or rales.   Musculoskeletal:   Cervical back: Neck supple.   Skin:  Coloration: Skin is not pale.   Neurological:   Mental Status: She is alert. Mental status is at baseline.   Psychiatric:      Mood and Affect: Mood normal.      Behavior: Behavior normal.      I have personally reviewed results with the patient.    Imaging:   CXR 8/15/23 - unremarkable    Labs:   Office Visit on 01/16/2024   Component Date Value Ref Range " Status    TSH 3RD GENERATON 01/17/2024 1.982  0.450 - 4.500 uIU/mL Final    The recommended reference ranges for TSH during pregnancy are as follows:   First trimester 0.100 to 2.500 uIU/mL   Second trimester  0.200 to 3.000 uIU/mL   Third trimester 0.300 to 3.000 uIU/m    Note: Normal ranges may not apply to patients who are transgender, non-binary, or whose legal sex, sex at birth, and gender identity differ.  Adult TSH (3rd generation) reference range follows the recommended guidelines of the [employer], January, 2020.       Transcribed for Hung Herrera MD, by Dilcia Bustos on 01/22/24 at 4:27 AM. Powered by [employer].

## 2024-01-16 NOTE — PATIENT INSTRUCTIONS
Continue escitalopram 5mg daily  Continue hydroxychloroquine 2 tabs daily; get regular eye exams  Continue amlodipine 10mg daily for blood pressure and Raynaud's symptoms  Restart celecoxib 100mg twice a day as needed for pain  Do labs    Return to clinic in 6 months

## 2024-01-17 ENCOUNTER — HOSPITAL ENCOUNTER (OUTPATIENT)
Dept: NEUROLOGY | Facility: CLINIC | Age: 54
Discharge: HOME | End: 2024-01-17
Payer: COMMERCIAL

## 2024-01-17 ENCOUNTER — APPOINTMENT (OUTPATIENT)
Age: 54
End: 2024-01-17
Payer: COMMERCIAL

## 2024-01-17 DIAGNOSIS — R20.2 BILATERAL NUMBNESS AND TINGLING OF ARMS AND LEGS: ICD-10-CM

## 2024-01-17 DIAGNOSIS — R20.0 BILATERAL NUMBNESS AND TINGLING OF ARMS AND LEGS: ICD-10-CM

## 2024-01-17 LAB — TSH SERPL DL<=0.05 MIU/L-ACNC: 1.98 UIU/ML (ref 0.45–4.5)

## 2024-01-17 PROCEDURE — 95886 MUSC TEST DONE W/N TEST COMP: CPT | Performed by: PSYCHIATRY & NEUROLOGY

## 2024-01-17 PROCEDURE — 84443 ASSAY THYROID STIM HORMONE: CPT | Performed by: INTERNAL MEDICINE

## 2024-01-17 PROCEDURE — 83520 IMMUNOASSAY QUANT NOS NONAB: CPT | Performed by: INTERNAL MEDICINE

## 2024-01-17 PROCEDURE — 95910 NRV CNDJ TEST 7-8 STUDIES: CPT | Performed by: PSYCHIATRY & NEUROLOGY

## 2024-01-17 PROCEDURE — 86235 NUCLEAR ANTIGEN ANTIBODY: CPT | Performed by: INTERNAL MEDICINE

## 2024-01-17 PROCEDURE — 83516 IMMUNOASSAY NONANTIBODY: CPT | Performed by: INTERNAL MEDICINE

## 2024-01-17 PROCEDURE — 83516 IMMUNOASSAY NONANTIBODY: CPT

## 2024-01-17 PROCEDURE — 36415 COLL VENOUS BLD VENIPUNCTURE: CPT | Performed by: INTERNAL MEDICINE

## 2024-02-01 LAB
EJ AB SER QL: NEGATIVE
ENA JO1 AB SER IA-ACNC: <20 UNITS
ENA PM/SCL AB SER-ACNC: <20 UNITS
ENA SS-A 52KD IGG SER IA-ACNC: <20 UNITS
FIBRILLARIN AB SER QL: NEGATIVE
KU AB SER QL: NEGATIVE
MDA5 AB SER LINE BLOT-ACNC: <20 UNITS
MI2 AB SER QL: NEGATIVE
MJ AB SER LINE BLOT-ACNC: <20 UNITS
OJ AB SER QL: NEGATIVE
PL12 AB SER QL: NEGATIVE
PL7 AB SER QL: NEGATIVE
SAE1 IGG SER QL LINE BLOT: <20 UNITS
SRP AB SERPL QL: NEGATIVE
TIF1-GAMMA AB SER LINE BLOT-ACNC: <20 UNITS
U1 SNRNP AB SER IA-ACNC: <20 UNITS
U2 SNRNP AB SER QL: NEGATIVE

## 2024-02-05 ENCOUNTER — RX ONLY (RX ONLY)
Age: 54
End: 2024-02-05

## 2024-02-05 RX ORDER — HYDROXYCHLOROQUINE SULFATE 200 MG/1
TABLET, FILM COATED ORAL
Qty: 60 | Refills: 0 | Status: ACTIVE | COMMUNITY

## 2024-02-06 ENCOUNTER — DOCTOR'S OFFICE (OUTPATIENT)
Dept: URBAN - NONMETROPOLITAN AREA CLINIC 1 | Facility: CLINIC | Age: 54
Setting detail: OPHTHALMOLOGY
End: 2024-02-06
Payer: COMMERCIAL

## 2024-02-06 ENCOUNTER — RX ONLY (RX ONLY)
Age: 54
End: 2024-02-06

## 2024-02-06 DIAGNOSIS — H04.123: ICD-10-CM

## 2024-02-06 DIAGNOSIS — Z79.899: ICD-10-CM

## 2024-02-06 DIAGNOSIS — H25.13: ICD-10-CM

## 2024-02-06 DIAGNOSIS — H35.363: ICD-10-CM

## 2024-02-06 DIAGNOSIS — R44.1: ICD-10-CM

## 2024-02-06 PROCEDURE — 92134 CPTRZ OPH DX IMG PST SGM RTA: CPT | Performed by: OPHTHALMOLOGY

## 2024-02-06 PROCEDURE — 92004 COMPRE OPH EXAM NEW PT 1/>: CPT | Performed by: OPHTHALMOLOGY

## 2024-02-06 ASSESSMENT — CONFRONTATIONAL VISUAL FIELD TEST (CVF)
OS_FINDINGS: FULL
OD_FINDINGS: FULL

## 2024-02-07 ENCOUNTER — OFFICE VISIT (OUTPATIENT)
Dept: FAMILY MEDICINE CLINIC | Facility: CLINIC | Age: 54
End: 2024-02-07
Payer: COMMERCIAL

## 2024-02-07 VITALS
DIASTOLIC BLOOD PRESSURE: 68 MMHG | HEIGHT: 63 IN | SYSTOLIC BLOOD PRESSURE: 124 MMHG | RESPIRATION RATE: 20 BRPM | OXYGEN SATURATION: 97 % | BODY MASS INDEX: 37.03 KG/M2 | WEIGHT: 209 LBS | TEMPERATURE: 97.2 F | HEART RATE: 80 BPM

## 2024-02-07 DIAGNOSIS — Z00.00 ANNUAL PHYSICAL EXAM: Primary | ICD-10-CM

## 2024-02-07 DIAGNOSIS — K21.9 GASTROESOPHAGEAL REFLUX DISEASE WITHOUT ESOPHAGITIS: ICD-10-CM

## 2024-02-07 DIAGNOSIS — H72.91 PERFORATION OF RIGHT TYMPANIC MEMBRANE: ICD-10-CM

## 2024-02-07 DIAGNOSIS — R20.2 PARESTHESIA: ICD-10-CM

## 2024-02-07 DIAGNOSIS — E55.9 VITAMIN D DEFICIENCY: ICD-10-CM

## 2024-02-07 DIAGNOSIS — G56.03 BILATERAL CARPAL TUNNEL SYNDROME: ICD-10-CM

## 2024-02-07 DIAGNOSIS — I10 HYPERTENSION, UNSPECIFIED TYPE: ICD-10-CM

## 2024-02-07 DIAGNOSIS — F17.210 SMOKING GREATER THAN 20 PACK YEARS: ICD-10-CM

## 2024-02-07 DIAGNOSIS — Z12.11 COLON CANCER SCREENING: ICD-10-CM

## 2024-02-07 DIAGNOSIS — R20.0 NUMBNESS: ICD-10-CM

## 2024-02-07 DIAGNOSIS — E78.5 DYSLIPIDEMIA: ICD-10-CM

## 2024-02-07 PROCEDURE — 99214 OFFICE O/P EST MOD 30 MIN: CPT | Performed by: FAMILY MEDICINE

## 2024-02-07 PROCEDURE — 99396 PREV VISIT EST AGE 40-64: CPT | Performed by: FAMILY MEDICINE

## 2024-02-08 NOTE — PROGRESS NOTES
ADULT ANNUAL PHYSICAL  [facility] PRIMARY CARE    NAME: Helen Ware  AGE: 53 y.o. SEX: female  : 1970     DATE: 2024     Assessment and Plan: Suspect carpal tunnel syndrome we will x-ray the hands bilaterally and provide a cock-up wrist splint bilaterally.  Patient currently is taking Celebrex 200 daily    Myalgias treated with Lexapro 5 mg daily  Perforation of the right tympanic membrane    Hypertension treated with Norvasc 10 mg daily    GERD without esophagitis currently taking Prilosec 20 mg    Dyslipidemia currently taking Lipitor 10 mg daily laboratories pending    The patient is agreeable to colon cancer screening we will be referring to gastroenterology     Problem List Items Addressed This Visit       Acid reflux    High blood pressure    Relevant Orders    CBC and differential    Comprehensive metabolic panel    Perforation of tympanic membrane    Bilateral carpal tunnel syndrome    Relevant Orders    C-reactive protein    XR hand 3+ vw right    XR hand 3+ vw left    Cock Up Wrist Splint     Other Visit Diagnoses       Annual physical exam    -  Primary    Smoking greater than 20 pack years        Relevant Orders    CT lung screening program    Dyslipidemia        Relevant Orders    CBC and differential    Comprehensive metabolic panel    Paresthesia        Numbness        Vitamin D deficiency        Relevant Orders    Vitamin D 25 hydroxy              Immunizations and preventive care screenings were discussed with patient today. Appropriate education was printed on patient's after visit summary.    Counseling:  Alcohol/drug use: discussed moderation in alcohol intake, the recommendations for healthy alcohol use, and avoidance of illicit drug use.  Dental Health: discussed importance of regular tooth brushing, flossing, and dental visits.  Injury prevention: discussed safety/seat belts, safety helmets, smoke detectors, carbon dioxide  detectors, and smoking near bedding or upholstery.  Sexual health: discussed sexually transmitted diseases, partner selection, use of condoms, avoidance of unintended pregnancy, and contraceptive alternatives.  Exercise: the importance of regular exercise/physical activity was discussed. Recommend exercise 3-5 times per week for at least 30 minutes.       Depression Screening and Follow-up Plan: Patient was screened for depression during today's encounter. They screened negative with a PHQ-2 score of 0.    Tobacco Cessation Counseling: Tobacco cessation counseling was not provided. The patient is sincerely urged to quit consumption of tobacco. She is not ready to quit tobacco. Medication options and side effects of medication not discussed. Patient agreed to medication.         Return in about 6 months (around 8/7/2024).     Chief Complaint:     Chief Complaint   Patient presents with    Annual Exam    Arthritis     Concerns of arthritis on hands; left worse than right      History of Present Illness:     Adult Annual Physical   Patient here for a comprehensive physical exam. The patient reports no problems.    Diet and Physical Activity  Diet/Nutrition: poor diet.   Exercise: no formal exercise.      Depression Screening  PHQ-2/9 Depression Screening    Little interest or pleasure in doing things: 0 - not at all  Feeling down, depressed, or hopeless: 0 - not at all  PHQ-2 Score: 0  PHQ-2 Interpretation: Negative depression screen       General Health  Sleep: sleeps poorly and gets 4-6 hours of sleep on average.   Hearing: normal - bilateral.  Vision: wears glasses.   Dental: brushes teeth three times daily and does not floss.       /GYN Health  Follows with gynecology? yes   Patient is: perimenopausal  Last menstrual period: 2 yrs  Contraceptive method: menopause.    Advanced Care Planning  Do you have an advanced directive? yes  Do you have a durable medical power of ? yes     Review of Systems:      Review of Systems   Constitutional:  Negative for chills and fever.   HENT:  Negative for ear pain and sore throat.    Eyes:  Negative for pain and visual disturbance.   Respiratory:  Negative for cough and shortness of breath.    Cardiovascular:  Negative for chest pain and palpitations.   Gastrointestinal:  Negative for abdominal pain and vomiting.   Genitourinary:  Negative for dysuria and hematuria.   Musculoskeletal:  Negative for arthralgias and back pain.   Skin:  Negative for color change and rash.   Neurological:  Negative for seizures and syncope.   All other systems reviewed and are negative.     Past Medical History:     Past Medical History:   Diagnosis Date    Acid reflux     Anxiety     Anxiety     Arthritis 4/13/20    in the neck    Bacterial vaginosis     Cleft palate     Costochondritis     DDD (degenerative disc disease), cervical     Frequency of urination     GERD (gastroesophageal reflux disease)     H/O colposcopy with cervical biopsy 03/27/2019    High arches     HPV (human papilloma virus) anogenital infection     HPV (human papilloma virus) infection     Hyperhidrosis     Hyperlipidemia     Hypertension     Ingrown toenail     Perforated ear drum     Raynaud's disease     Tobacco dependence     Umbilical hernia     Vocal cords swelling     Wears glasses       Past Surgical History:     Past Surgical History:   Procedure Laterality Date    CERVICAL BIOPSY N/A 09/13/2021    Procedure: CONE BIOPSY OF THE CERVIX;  Surgeon: Christiano Dubois MD;  Location:  MAIN OR;  Service: Gynecology    CLEFT PALATE REPAIR      TOENAIL EXCISION      TYMPANOPLASTY Left     TYMPANOSTOMY TUBE PLACEMENT        Social History:     Social History     Socioeconomic History    Marital status: Single     Spouse name: None    Number of children: None    Years of education: None    Highest education level: None   Occupational History    Occupation:     Tobacco Use    Smoking status: Every Day      Current packs/day: 1.00     Average packs/day: 1 pack/day for 39.1 years (39.1 ttl pk-yrs)     Types: Cigarettes     Start date: 1/1/1985    Smokeless tobacco: Never    Tobacco comments:     04/18/2022-- max 1 pack per day   Vaping Use    Vaping status: Never Used   Substance and Sexual Activity    Alcohol use: Not Currently    Drug use: Never    Sexual activity: Yes   Other Topics Concern    None   Social History Narrative    Smoke: 1 pack daily x 25 yrs - As per paper chart      Social Determinants of Health     Financial Resource Strain: Not on file   Food Insecurity: Not on file   Transportation Needs: Not on file   Physical Activity: Not on file   Stress: Not on file   Social Connections: Not on file   Intimate Partner Violence: Not on file   Housing Stability: Not on file      Family History:     Family History   Problem Relation Age of Onset    Lung cancer Mother     Mental illness Mother     Cancer Mother         I believe it was lung cancer    Early death Mother         Passed away 3/4/98 (54 yrs old)    Miscarriages / Stillbirths Mother     Heart disease Father     Hypertension Father     Early death Father         Passed away 8/27/07 (67 yrs old)    Uterine cancer Sister     No Known Problems Maternal Grandmother     No Known Problems Maternal Grandfather     No Known Problems Paternal Grandmother     No Known Problems Paternal Grandfather     No Known Problems Maternal Aunt     Cancer Paternal Aunt         unknown origin     Cancer Sister     Stroke Sister     Miscarriages / Stillbirths Sister       Current Medications:     Current Outpatient Medications   Medication Sig Dispense Refill    amLODIPine (NORVASC) 10 mg tablet Take 1 tablet (10 mg total) by mouth daily 90 tablet 1    atorvastatin (LIPITOR) 10 mg tablet Take 1 tablet by mouth once daily 90 tablet 0    celecoxib (CeleBREX) 100 mg capsule Take 1 capsule (100 mg total) by mouth 2 (two) times a day As needed for joint pain 60 capsule 6     "cholecalciferol (VITAMIN D3) 1,000 units tablet Take 1,000 Units by mouth daily      escitalopram (LEXAPRO) 5 mg tablet Take 1 tablet (5 mg total) by mouth daily 30 tablet 6    hydroxychloroquine (PLAQUENIL) 200 mg tablet Take 2 tablets (400 mg total) by mouth daily at bedtime 180 tablet 1    omeprazole (PriLOSEC) 20 mg delayed release capsule Take 1 capsule by mouth once daily 30 capsule 5     No current facility-administered medications for this visit.      Allergies:     Allergies   Allergen Reactions    Flexeril [Cyclobenzaprine] Other (See Comments)     Patient reported \"Dry Mouth\"    Medical Tape Hives      Physical Exam:     /68   Pulse 80   Temp (!) 97.2 °F (36.2 °C)   Resp 20   Ht 5' 3\" (1.6 m)   Wt 94.8 kg (209 lb)   SpO2 97%   BMI 37.02 kg/m²     Physical Exam  Vitals and nursing note reviewed.   Constitutional:       General: She is not in acute distress.     Appearance: She is well-developed. She is obese.   HENT:      Head: Normocephalic and atraumatic.      Right Ear: Ear canal and external ear normal. Tympanic membrane is perforated.      Left Ear: Tympanic membrane, ear canal and external ear normal.      Nose: Nose normal.      Mouth/Throat:      Mouth: Mucous membranes are moist.      Pharynx: Oropharynx is clear.   Eyes:      Conjunctiva/sclera: Conjunctivae normal.      Pupils: Pupils are equal, round, and reactive to light.   Cardiovascular:      Rate and Rhythm: Normal rate and regular rhythm.      Pulses: Normal pulses.      Heart sounds: Normal heart sounds. No murmur heard.  Pulmonary:      Effort: Pulmonary effort is normal. No respiratory distress.      Breath sounds: Normal breath sounds.   Abdominal:      General: Abdomen is flat. Bowel sounds are normal.      Palpations: Abdomen is soft.      Tenderness: There is no abdominal tenderness.   Musculoskeletal:         General: No swelling. Normal range of motion.      Cervical back: Normal range of motion and neck supple. "   Skin:     General: Skin is warm and dry.      Capillary Refill: Capillary refill takes less than 2 seconds.   Neurological:      General: No focal deficit present.      Mental Status: She is alert and oriented to person, place, and time.   Psychiatric:         Mood and Affect: Mood normal.          Sin Cristina,   [facility] PRIMARY CARE  I discussed with her that she is a candidate for lung cancer CT screening.     The following Shared Decision-Making points were covered:  Benefits of screening were discussed, including the rates of reduction in death from lung cancer and other causes.  Harms of screening were reviewed, including false positive tests, radiation exposure levels, risks of invasive procedures, risks of complications of screening, and risk of overdiagnosis.  I counseled on the importance of adherence to annual lung cancer LDCT screening, impact of co-morbidities, and ability or willingness to undergo diagnosis and treatment.  I counseled on the importance of maintaining abstinence as a former smoker or was counseled on the importance of smoking cessation if a current smoker    Review of Eligibility Criteria: She meets all of the criteria for Lung Cancer Screening.   She is 53 y.o.   She has 20 pack year tobacco history and is a current smoker or has quit within the past 15 years  She presents no signs or symptoms of lung cancer    After discussion, the patient decided to elect lung cancer screening.

## 2024-02-11 DIAGNOSIS — K21.9 GASTROESOPHAGEAL REFLUX DISEASE: ICD-10-CM

## 2024-02-12 ENCOUNTER — APPOINTMENT (OUTPATIENT)
Dept: LAB | Facility: HOSPITAL | Age: 54
End: 2024-02-12
Attending: FAMILY MEDICINE
Payer: COMMERCIAL

## 2024-02-12 ENCOUNTER — APPOINTMENT (OUTPATIENT)
Dept: RADIOLOGY | Facility: CLINIC | Age: 54
End: 2024-02-12
Payer: COMMERCIAL

## 2024-02-12 ENCOUNTER — HOSPITAL ENCOUNTER (OUTPATIENT)
Dept: RADIOLOGY | Facility: HOSPITAL | Age: 54
Discharge: HOME | End: 2024-02-12
Payer: COMMERCIAL

## 2024-02-12 DIAGNOSIS — E55.9 VITAMIN D DEFICIENCY: ICD-10-CM

## 2024-02-12 DIAGNOSIS — G56.03 BILATERAL CARPAL TUNNEL SYNDROME: ICD-10-CM

## 2024-02-12 DIAGNOSIS — M62.81 MUSCLE WEAKNESS (GENERALIZED): ICD-10-CM

## 2024-02-12 DIAGNOSIS — R25.3 FASCICULATION: ICD-10-CM

## 2024-02-12 DIAGNOSIS — E78.5 DYSLIPIDEMIA: ICD-10-CM

## 2024-02-12 DIAGNOSIS — I10 HYPERTENSION, UNSPECIFIED TYPE: ICD-10-CM

## 2024-02-12 LAB
25(OH)D3 SERPL-MCNC: 37.6 NG/ML (ref 30–100)
ALBUMIN SERPL BCP-MCNC: 4.4 G/DL (ref 3.5–5)
ALP SERPL-CCNC: 108 U/L (ref 34–104)
ALT SERPL W P-5'-P-CCNC: 16 U/L (ref 7–52)
ANION GAP SERPL CALCULATED.3IONS-SCNC: 9 MMOL/L
AST SERPL W P-5'-P-CCNC: 22 U/L (ref 13–39)
BASOPHILS # BLD AUTO: 0.07 THOUSANDS/ÂΜL (ref 0–0.1)
BASOPHILS NFR BLD AUTO: 1 % (ref 0–1)
BILIRUB SERPL-MCNC: 0.34 MG/DL (ref 0.2–1)
BUN SERPL-MCNC: 12 MG/DL (ref 5–25)
CALCIUM SERPL-MCNC: 9.3 MG/DL (ref 8.4–10.2)
CHLORIDE SERPL-SCNC: 108 MMOL/L (ref 96–108)
CO2 SERPL-SCNC: 26 MMOL/L (ref 21–32)
CREAT SERPL-MCNC: 0.75 MG/DL (ref 0.6–1.3)
CRP SERPL QL: <1 MG/L
EOSINOPHIL # BLD AUTO: 0.1 THOUSAND/ÂΜL (ref 0–0.61)
EOSINOPHIL NFR BLD AUTO: 2 % (ref 0–6)
ERYTHROCYTE [DISTWIDTH] IN BLOOD BY AUTOMATED COUNT: 13.8 % (ref 11.6–15.1)
FERRITIN SERPL-MCNC: 19 NG/ML (ref 11–307)
GFR SERPL CREATININE-BSD FRML MDRD: 91 ML/MIN/1.73SQ M
GLUCOSE P FAST SERPL-MCNC: 84 MG/DL (ref 65–99)
HCT VFR BLD AUTO: 43.2 % (ref 34.8–46.1)
HGB BLD-MCNC: 14.1 G/DL (ref 11.5–15.4)
IMM GRANULOCYTES # BLD AUTO: 0.01 THOUSAND/UL (ref 0–0.2)
IMM GRANULOCYTES NFR BLD AUTO: 0 % (ref 0–2)
LYMPHOCYTES # BLD AUTO: 1.77 THOUSANDS/ÂΜL (ref 0.6–4.47)
LYMPHOCYTES NFR BLD AUTO: 30 % (ref 14–44)
MCH RBC QN AUTO: 29.9 PG (ref 26.8–34.3)
MCHC RBC AUTO-ENTMCNC: 32.6 G/DL (ref 31.4–37.4)
MCV RBC AUTO: 92 FL (ref 82–98)
MONOCYTES # BLD AUTO: 0.5 THOUSAND/ÂΜL (ref 0.17–1.22)
MONOCYTES NFR BLD AUTO: 8 % (ref 4–12)
NEUTROPHILS # BLD AUTO: 3.52 THOUSANDS/ÂΜL (ref 1.85–7.62)
NEUTS SEG NFR BLD AUTO: 59 % (ref 43–75)
NRBC BLD AUTO-RTO: 0 /100 WBCS
PLATELET # BLD AUTO: 216 THOUSANDS/UL (ref 149–390)
PMV BLD AUTO: 10.1 FL (ref 8.9–12.7)
POTASSIUM SERPL-SCNC: 4.1 MMOL/L (ref 3.5–5.3)
PROT SERPL-MCNC: 7.1 G/DL (ref 6.4–8.4)
RBC # BLD AUTO: 4.72 MILLION/UL (ref 3.81–5.12)
SODIUM SERPL-SCNC: 143 MMOL/L (ref 135–147)
WBC # BLD AUTO: 5.97 THOUSAND/UL (ref 4.31–10.16)

## 2024-02-12 PROCEDURE — 82728 ASSAY OF FERRITIN: CPT

## 2024-02-12 PROCEDURE — 73130 X-RAY EXAM OF HAND: CPT

## 2024-02-12 PROCEDURE — 82306 VITAMIN D 25 HYDROXY: CPT

## 2024-02-12 PROCEDURE — 80053 COMPREHEN METABOLIC PANEL: CPT

## 2024-02-12 PROCEDURE — 36415 COLL VENOUS BLD VENIPUNCTURE: CPT

## 2024-02-12 PROCEDURE — 85025 COMPLETE CBC W/AUTO DIFF WBC: CPT

## 2024-02-12 PROCEDURE — 86140 C-REACTIVE PROTEIN: CPT

## 2024-02-12 RX ORDER — OMEPRAZOLE 20 MG/1
CAPSULE, DELAYED RELEASE ORAL
Qty: 90 CAPSULE | Refills: 1 | Status: SHIPPED | OUTPATIENT
Start: 2024-02-12

## 2024-02-14 ENCOUNTER — DOCTOR'S OFFICE (OUTPATIENT)
Dept: URBAN - NONMETROPOLITAN AREA CLINIC 1 | Facility: CLINIC | Age: 54
Setting detail: OPHTHALMOLOGY
End: 2024-02-14
Payer: COMMERCIAL

## 2024-02-14 DIAGNOSIS — H52.03: ICD-10-CM

## 2024-02-14 DIAGNOSIS — H52.4: ICD-10-CM

## 2024-02-14 PROBLEM — H25.13 CATARACT NUCLEAR SCLEROSIS; BOTH EYES: Status: ACTIVE | Noted: 2024-02-06

## 2024-02-14 PROBLEM — H04.123 DRY EYE; BOTH EYES: Status: ACTIVE | Noted: 2024-02-06

## 2024-02-14 PROBLEM — R44.1: Status: ACTIVE | Noted: 2024-02-06

## 2024-02-14 PROBLEM — H35.363 DRUSEN; BOTH EYES: Status: ACTIVE | Noted: 2024-02-06

## 2024-02-14 PROCEDURE — 92012 INTRM OPH EXAM EST PATIENT: CPT | Performed by: OPTOMETRIST

## 2024-02-14 ASSESSMENT — CONFRONTATIONAL VISUAL FIELD TEST (CVF)
OS_FINDINGS: FULL
OD_FINDINGS: FULL

## 2024-02-21 ENCOUNTER — OFFICE VISIT (OUTPATIENT)
Dept: PODIATRY | Facility: CLINIC | Age: 54
End: 2024-02-21
Payer: COMMERCIAL

## 2024-02-21 VITALS
BODY MASS INDEX: 37.03 KG/M2 | SYSTOLIC BLOOD PRESSURE: 136 MMHG | HEART RATE: 97 BPM | DIASTOLIC BLOOD PRESSURE: 80 MMHG | WEIGHT: 209 LBS | HEIGHT: 63 IN

## 2024-02-21 DIAGNOSIS — M79.674 GREAT TOE PAIN, RIGHT: Primary | ICD-10-CM

## 2024-02-21 DIAGNOSIS — R21 RASH OF FOOT: ICD-10-CM

## 2024-02-21 DIAGNOSIS — L60.0 INGROWN RIGHT BIG TOENAIL: ICD-10-CM

## 2024-02-21 PROBLEM — Z98.890 H/O COLPOSCOPY WITH CERVICAL BIOPSY: Status: RESOLVED | Noted: 2019-03-27 | Resolved: 2024-02-21

## 2024-02-21 PROBLEM — Z12.11 SCREENING FOR COLON CANCER: Status: RESOLVED | Noted: 2020-05-15 | Resolved: 2024-02-21

## 2024-02-21 PROCEDURE — 99213 OFFICE O/P EST LOW 20 MIN: CPT | Performed by: STUDENT IN AN ORGANIZED HEALTH CARE EDUCATION/TRAINING PROGRAM

## 2024-02-21 PROCEDURE — 11730 AVULSION NAIL PLATE SIMPLE 1: CPT | Performed by: STUDENT IN AN ORGANIZED HEALTH CARE EDUCATION/TRAINING PROGRAM

## 2024-02-21 RX ORDER — LIDOCAINE HYDROCHLORIDE 10 MG/ML
3 INJECTION, SOLUTION INFILTRATION; PERINEURAL ONCE
Status: COMPLETED | OUTPATIENT
Start: 2024-02-21 | End: 2024-02-21

## 2024-02-21 RX ORDER — CLOTRIMAZOLE AND BETAMETHASONE DIPROPIONATE 10; .64 MG/G; MG/G
CREAM TOPICAL 2 TIMES DAILY
Qty: 45 G | Refills: 1 | Status: SHIPPED | OUTPATIENT
Start: 2024-02-21

## 2024-02-21 RX ADMIN — LIDOCAINE HYDROCHLORIDE 3 ML: 10 INJECTION, SOLUTION INFILTRATION; PERINEURAL at 13:43

## 2024-02-21 NOTE — PROGRESS NOTES
Assessment/Plan:    No problem-specific Assessment & Plan notes found for this encounter.       Diagnoses and all orders for this visit:    Great toe pain, right  -     lidocaine (XYLOCAINE) 1 % injection 3 mL    Ingrown right big toenail  -     lidocaine (XYLOCAINE) 1 % injection 3 mL    Rash of foot  -     clotrimazole-betamethasone (LOTRISONE) 1-0.05 % cream; Apply topically 2 (two) times a day      Plan:    - Patient presents with ingrown toenail on the (medial/lateral) border of (right) hallux. Partial toenail avulsion procedure was performed as detailed below.  I informed patient given she has fungus to her toenail plate it is a possibility her nail may fall off.  She expressed understanding would like to proceed with the procedure.     - The procedure, anesthesia, complications and alternative care were explained in great detail. No warranties or guarantees were given as to the outcome of the procedure. Verbal consent was obtained from the patient. 3cc of 1% lidocaine plain was injected in to the right hallux following sterile alcohol prep. A tourniquet was applied to the hallux for hemostasis. Under sterile conditions the (partial) nail plate was removed.  A dry sterile dressing with antibiotic ointment was applied to the surgical site.  Home care instructions were given to the patient including decreased activity, ice and OTC pain medication as needed for 3 days. Patient will also perform daily dressing changes until the surgical site is fully healed. Patient tolerated the procedure well and with no complications.       - Monitor for infection: pus (thick yellow drainage), redness tracking up the foot, fever, nausea, vomiting, fever, chills. If any of these issues arise, call clinic immediately or go to urgent care or emergency room to see provider    -Rx Rudy for bilateral feet rash     - The patient will return in 10 days for follow-up.        Subjective:      Patient ID: Helen Ware is a 53 y.o.  female.    53-year-old female with past medical history as below presents for evaluation of right toe pain secondary to an ingrown toenail and thickened mycotic appearing toenail plate.  Patient reports she has discomfort with pressure specifically when her dog stepped on it.  In past she did have slant back procedure done which helped with the pain however the pain has reoccurred on the different side of the same toenail.  She also presents with a moccasin type distributed tinea pedis rash to bilateral feet.  She has no other complaints at this visit.        The following portions of the patient's history were reviewed and updated as appropriate: She  has a past medical history of Acid reflux, Anxiety, Anxiety, Arthritis (4/13/20), Bacterial vaginosis, Cleft palate, Costochondritis, DDD (degenerative disc disease), cervical, Frequency of urination, GERD (gastroesophageal reflux disease), H/O colposcopy with cervical biopsy (03/27/2019), High arches, HPV (human papilloma virus) anogenital infection, HPV (human papilloma virus) infection, Hyperhidrosis, Hyperlipidemia, Hypertension, Ingrown toenail, Perforated ear drum, Raynaud's disease, Tobacco dependence, Umbilical hernia, Vocal cords swelling, and Wears glasses.  She   Patient Active Problem List    Diagnosis Date Noted    Bilateral carpal tunnel syndrome 02/07/2024    Bilateral numbness and tingling of arms and legs 01/17/2024    Infected epidermoid cyst 12/27/2021    Mild cervical dysplasia 09/13/2021    Sebaceous cyst of right axilla 09/20/2020    Foraminal stenosis of cervical region 05/27/2020    Spondylosis of cervical region without myelopathy or radiculopathy 05/27/2020    Degenerative cervical disc 05/15/2020    Tobacco use 05/15/2020    Fibroids 05/08/2020    Ovarian cyst 05/08/2020    Umbilical hernia 05/08/2020    Dysphagia 05/02/2020    Tachycardia 05/02/2020    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    Degeneration of intervertebral disc of cervical region  "04/13/2020    Tietze syndrome 03/30/2020    Age-related nuclear cataract, bilateral 02/07/2020    Presbyopia 02/07/2020    Abnormality of right breast on screening mammogram 01/27/2020    High blood pressure 11/13/2019    Microhematuria 04/11/2019    Blood in urine 03/27/2019    Vocal cord contact ulcer 03/12/2019    HPV (human papilloma virus) infection 02/26/2019    Vocal cords swelling 01/13/2015    Acid reflux 07/01/2003    Claustrophobia 01/01/1997    Raynaud's disease 10/01/1984    Perforation of tympanic membrane 04/01/1981    Cleft palate 1970    Deviated septum 1970     She  has a past surgical history that includes Cleft palate repair; Tympanostomy tube placement; Tympanoplasty (Left); Cervical biopsy (N/A, 09/13/2021); and Toenail excision.  Current Outpatient Medications   Medication Sig Dispense Refill    amLODIPine (NORVASC) 10 mg tablet Take 1 tablet (10 mg total) by mouth daily 90 tablet 1    atorvastatin (LIPITOR) 10 mg tablet Take 1 tablet by mouth once daily 90 tablet 0    celecoxib (CeleBREX) 100 mg capsule Take 1 capsule (100 mg total) by mouth 2 (two) times a day As needed for joint pain 60 capsule 6    cholecalciferol (VITAMIN D3) 1,000 units tablet Take 1,000 Units by mouth daily      clotrimazole-betamethasone (LOTRISONE) 1-0.05 % cream Apply topically 2 (two) times a day 45 g 1    escitalopram (LEXAPRO) 5 mg tablet Take 1 tablet (5 mg total) by mouth daily 30 tablet 6    hydroxychloroquine (PLAQUENIL) 200 mg tablet Take 2 tablets (400 mg total) by mouth daily at bedtime 180 tablet 1    omeprazole (PriLOSEC) 20 mg delayed release capsule Take 1 capsule by mouth once daily 90 capsule 1     No current facility-administered medications for this visit.   .    Review of Systems   All other systems reviewed and are negative.        Objective:      /80 (BP Location: Left arm, Patient Position: Sitting, Cuff Size: Large)   Pulse 97   Ht 5' 3\" (1.6 m)   Wt 94.8 kg (209 lb)   BMI " "37.02 kg/m²          Physical Exam  Vitals reviewed.   Cardiovascular:      Pulses:           Dorsalis pedis pulses are 2+ on the right side and 2+ on the left side.        Posterior tibial pulses are 2+ on the right side and 2+ on the left side.   Feet:      Right foot:      Skin integrity: Dry skin present.      Toenail Condition: Right toenails are ingrown.      Left foot:      Skin integrity: Dry skin present.      Comments: Pain with palpation noted to the right big toe medial lateral toenail border.  Nail plate appear incurvated into the nail margin.  Consistent with ingrown toenail.  Nail plate is thickened and mycotic appearing.    Consent type distributed tinea pedis noted bilateral plantar feet and arch.      Nail removal    Date/Time: 2/21/2024 1:00 PM    Performed by: Ami Cardenas DPM  Authorized by: Ami Cardenas DPM    Patient location:  ClinicUniversal Protocol:  Consent: Verbal consent obtained.  Risks and benefits: risks, benefits and alternatives were discussed  Consent given by: patient  Time out: Immediately prior to procedure a \"time out\" was called to verify the correct patient, procedure, equipment, support staff and site/side marked as required.  Patient understanding: patient states understanding of the procedure being performed  Patient identity confirmed: verbally with patient    Location:     Foot:  R big toe  Pre-procedure details:     Skin preparation:  Alcohol and Betadine  Anesthesia (see MAR for exact dosages):     Anesthesia method:  Local infiltration    Local anesthetic:  Lidocaine 1% w/o epi (3cc)  Nail Removal:     Nail removed:  Partial    Nail side:  Medial (lateral)  Post-procedure details:     Dressing:  4x4 sterile gauze, antibiotic ointment and gauze roll    Patient tolerance of procedure:  Tolerated well, no immediate complications        "

## 2024-03-06 ENCOUNTER — OFFICE VISIT (OUTPATIENT)
Dept: FAMILY MEDICINE CLINIC | Facility: CLINIC | Age: 54
End: 2024-03-06
Payer: COMMERCIAL

## 2024-03-06 VITALS
BODY MASS INDEX: 37.56 KG/M2 | DIASTOLIC BLOOD PRESSURE: 68 MMHG | RESPIRATION RATE: 20 BRPM | HEIGHT: 63 IN | TEMPERATURE: 98.2 F | SYSTOLIC BLOOD PRESSURE: 124 MMHG | HEART RATE: 68 BPM | WEIGHT: 212 LBS

## 2024-03-06 DIAGNOSIS — F17.200 TOBACCO USE DISORDER: ICD-10-CM

## 2024-03-06 DIAGNOSIS — L30.9 DERMATITIS: ICD-10-CM

## 2024-03-06 DIAGNOSIS — M79.10 MYALGIA: ICD-10-CM

## 2024-03-06 DIAGNOSIS — I10 HYPERTENSION, UNSPECIFIED TYPE: ICD-10-CM

## 2024-03-06 DIAGNOSIS — L40.9 SCALP PSORIASIS: Primary | ICD-10-CM

## 2024-03-06 DIAGNOSIS — K21.9 GASTROESOPHAGEAL REFLUX DISEASE WITHOUT ESOPHAGITIS: ICD-10-CM

## 2024-03-06 PROCEDURE — 99214 OFFICE O/P EST MOD 30 MIN: CPT | Performed by: FAMILY MEDICINE

## 2024-03-06 RX ORDER — TRIAMCINOLONE ACETONIDE 1 MG/G
CREAM TOPICAL 2 TIMES DAILY
Qty: 80 G | Refills: 0 | Status: SHIPPED | OUTPATIENT
Start: 2024-03-06

## 2024-03-06 NOTE — PROGRESS NOTES
Assessment/Plan: Nonspecific dermatitis versus scalp psoriasis will provide Kenalog to be utilized twice daily.    Tobacco use disorder Discussed tobacco cessation the patient states she is cutting back    Hypertensive cardiovascular disease with a blood pressure controlled on the current regimen    GERD with symptoms minimized on Prilosec 20 mg daily    Myalgia Lexapro 5 mg is effective tender        Problem List Items Addressed This Visit     Acid reflux    High blood pressure   Other Visit Diagnoses     Scalp psoriasis    -  Primary    Relevant Medications    triamcinolone (KENALOG) 0.1 % cream    Dermatitis        Relevant Medications    triamcinolone (KENALOG) 0.1 % cream    Tobacco use disorder        Myalgia                 Diagnoses and all orders for this visit:    Scalp psoriasis  -     triamcinolone (KENALOG) 0.1 % cream; Apply topically 2 (two) times a day    Dermatitis  -     triamcinolone (KENALOG) 0.1 % cream; Apply topically 2 (two) times a day    Tobacco use disorder    Hypertension, unspecified type    Gastroesophageal reflux disease without esophagitis    Myalgia        No problem-specific Assessment & Plan notes found for this encounter.      PHQ-2/9 Depression Screening            Body mass index is 37.55 kg/m².    BMI Counseling: Body mass index is 37.55 kg/m². The BMI     Subjective:      Patient ID: Helen Ware is a 53 y.o. female.    Patient presents with a chief complaint of a sore on the right side of the scalp times several weeks is painful when pressure is applied        The following portions of the patient's history were reviewed and updated as appropriate:   She has a past medical history of Acid reflux, Anxiety, Anxiety, Arthritis (4/13/20), Bacterial vaginosis, Cleft palate, Costochondritis, DDD (degenerative disc disease), cervical, Frequency of urination, GERD (gastroesophageal reflux disease), H/O colposcopy with cervical biopsy (03/27/2019), High arches, HPV (human papilloma  virus) anogenital infection, HPV (human papilloma virus) infection, Hyperhidrosis, Hyperlipidemia, Hypertension, Ingrown toenail, Perforated ear drum, Raynaud's disease, Tobacco dependence, Umbilical hernia, Vocal cords swelling, and Wears glasses.,  does not have any pertinent problems on file.,   has a past surgical history that includes Cleft palate repair; Tympanostomy tube placement; Tympanoplasty (Left); Cervical biopsy (N/A, 09/13/2021); and Toenail excision.,  family history includes Cancer in her mother, paternal aunt, and sister; Early death in her father and mother; Heart disease in her father; Hypertension in her father; Lung cancer in her mother; Mental illness in her mother; Miscarriages / Stillbirths in her mother and sister; No Known Problems in her maternal aunt, maternal grandfather, maternal grandmother, paternal grandfather, and paternal grandmother; Stroke in her sister; Uterine cancer in her sister.,   reports that she has been smoking cigarettes. She started smoking about 39 years ago. She has a 39.2 pack-year smoking history. She has never used smokeless tobacco. She reports that she does not currently use alcohol. She reports that she does not use drugs.,  is allergic to flexeril [cyclobenzaprine] and medical tape..  Current Outpatient Medications   Medication Sig Dispense Refill   • triamcinolone (KENALOG) 0.1 % cream Apply topically 2 (two) times a day 80 g 0   • amLODIPine (NORVASC) 10 mg tablet Take 1 tablet (10 mg total) by mouth daily 90 tablet 1   • atorvastatin (LIPITOR) 10 mg tablet Take 1 tablet by mouth once daily 90 tablet 0   • celecoxib (CeleBREX) 100 mg capsule Take 1 capsule (100 mg total) by mouth 2 (two) times a day As needed for joint pain 60 capsule 6   • cholecalciferol (VITAMIN D3) 1,000 units tablet Take 1,000 Units by mouth daily     • clotrimazole-betamethasone (LOTRISONE) 1-0.05 % cream Apply topically 2 (two) times a day 45 g 1   • escitalopram (LEXAPRO) 5 mg  "tablet Take 1 tablet (5 mg total) by mouth daily 30 tablet 6   • hydroxychloroquine (PLAQUENIL) 200 mg tablet Take 2 tablets (400 mg total) by mouth daily at bedtime 180 tablet 1   • omeprazole (PriLOSEC) 20 mg delayed release capsule Take 1 capsule by mouth once daily 90 capsule 1     No current facility-administered medications for this visit.       Review of Systems   Constitutional:  Negative for chills and fever.   HENT:  Negative for ear pain and sore throat.    Eyes:  Negative for pain and visual disturbance.   Respiratory:  Negative for cough and shortness of breath.    Cardiovascular:  Negative for chest pain and palpitations.   Gastrointestinal:  Negative for abdominal pain and vomiting.   Genitourinary:  Negative for dysuria and hematuria.   Musculoskeletal:  Negative for arthralgias and back pain.   Skin:  Negative for color change and rash.        Painful subcentimeter lesion on right side of head   Neurological:  Negative for seizures and syncope.   All other systems reviewed and are negative.        Objective:    /68   Pulse 68   Temp 98.2 °F (36.8 °C)   Resp 20   Ht 5' 3\" (1.6 m)   Wt 96.2 kg (212 lb)   BMI 37.55 kg/m²   Body mass index is 37.55 kg/m².     Physical Exam  Constitutional:       Appearance: Normal appearance. She is well-developed.   HENT:      Head: Normocephalic and atraumatic.      Right Ear: Tympanic membrane, ear canal and external ear normal.      Left Ear: Tympanic membrane, ear canal and external ear normal.      Nose: Nose normal.      Mouth/Throat:      Mouth: Mucous membranes are moist.      Pharynx: Oropharynx is clear.   Eyes:      Extraocular Movements: Extraocular movements intact.      Conjunctiva/sclera: Conjunctivae normal.      Pupils: Pupils are equal, round, and reactive to light.   Cardiovascular:      Rate and Rhythm: Normal rate and regular rhythm.      Pulses: Normal pulses.      Heart sounds: Normal heart sounds.   Pulmonary:      Effort: Pulmonary " effort is normal.      Breath sounds: Normal breath sounds.   Abdominal:      General: Abdomen is flat. Bowel sounds are normal.      Palpations: Abdomen is soft.      Tenderness: There is no abdominal tenderness.   Musculoskeletal:         General: Normal range of motion.      Cervical back: Normal range of motion and neck supple.   Skin:     General: Skin is warm and dry.      Capillary Refill: Capillary refill takes less than 2 seconds.   Neurological:      General: No focal deficit present.      Mental Status: She is alert and oriented to person, place, and time.   Psychiatric:         Mood and Affect: Mood normal.         Behavior: Behavior normal.         Thought Content: Thought content normal.         Judgment: Judgment normal.

## 2024-03-13 ENCOUNTER — OFFICE VISIT (OUTPATIENT)
Dept: PODIATRY | Facility: CLINIC | Age: 54
End: 2024-03-13
Payer: COMMERCIAL

## 2024-03-13 VITALS
BODY MASS INDEX: 37.56 KG/M2 | SYSTOLIC BLOOD PRESSURE: 133 MMHG | HEIGHT: 63 IN | DIASTOLIC BLOOD PRESSURE: 74 MMHG | HEART RATE: 86 BPM | WEIGHT: 212 LBS

## 2024-03-13 DIAGNOSIS — B35.1 TINEA UNGUIUM: Primary | ICD-10-CM

## 2024-03-13 PROCEDURE — 99213 OFFICE O/P EST LOW 20 MIN: CPT | Performed by: STUDENT IN AN ORGANIZED HEALTH CARE EDUCATION/TRAINING PROGRAM

## 2024-03-13 RX ORDER — CICLOPIROX 80 MG/ML
SOLUTION TOPICAL
Qty: 6 ML | Refills: 6 | Status: SHIPPED | OUTPATIENT
Start: 2024-03-13

## 2024-03-13 NOTE — PROGRESS NOTES
Assessment/Plan:    No problem-specific Assessment & Plan notes found for this encounter.       Diagnoses and all orders for this visit:    Tinea unguium  -     ciclopirox (PENLAC) 8 % solution; Apply topically daily at bedtime Apply Penlac at bedtime to the affected toenails daily, wipe off the Penlac from the toenails with acetone or rubbing alcohol on the 7th day and restart the cycle.      Plan:     - Patient was counseled and educated on the condition and the diagnosis.  The diagnosis, treatment options and prognosis were discussed in detail.   - right hallux partial toenail avulsion with site has completely healed.  - discussed nail trimming techniques and signs of ingrown toenail reoccurrence  - diagnosis and treatments were discussed with patient. I offered patient various treatment options including topical OTC and prescription anti-fungal topicals and oral anti-fungal medications. I also explained patient risks and benefits of each treatment. Patient opted for topical anti-fungal. Rx Panlac solution and went over the application protocol. Patient agrees with plan and understands. All questions and concerns were addressed.   - return in 6 months   - Call if any questions         Subjective:      Patient ID: Helen Ware is a 53 y.o. female.    53-year-old female with past medical history as below presents for continued follow-up of right big toe partial toenail avulsion.  Patient reports she is doing well without any discomfort.  She presents with mycotic appearing toenails multiple toes bilateral feet.  Interested in treatment options.  No other complaints.        The following portions of the patient's history were reviewed and updated as appropriate: She  has a past medical history of Acid reflux, Anxiety, Anxiety, Arthritis (4/13/20), Bacterial vaginosis, Cleft palate, Costochondritis, DDD (degenerative disc disease), cervical, Frequency of urination, GERD (gastroesophageal reflux disease), H/O  colposcopy with cervical biopsy (03/27/2019), High arches, HPV (human papilloma virus) anogenital infection, HPV (human papilloma virus) infection, Hyperhidrosis, Hyperlipidemia, Hypertension, Ingrown toenail, Perforated ear drum, Raynaud's disease, Tobacco dependence, Umbilical hernia, Vocal cords swelling, and Wears glasses.  She   Patient Active Problem List    Diagnosis Date Noted    Bilateral carpal tunnel syndrome 02/07/2024    Bilateral numbness and tingling of arms and legs 01/17/2024    Infected epidermoid cyst 12/27/2021    Mild cervical dysplasia 09/13/2021    Sebaceous cyst of right axilla 09/20/2020    Foraminal stenosis of cervical region 05/27/2020    Spondylosis of cervical region without myelopathy or radiculopathy 05/27/2020    Degenerative cervical disc 05/15/2020    Tobacco use 05/15/2020    Fibroids 05/08/2020    Ovarian cyst 05/08/2020    Umbilical hernia 05/08/2020    Dysphagia 05/02/2020    Tachycardia 05/02/2020    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    Degeneration of intervertebral disc of cervical region 04/13/2020    Tietze syndrome 03/30/2020    Age-related nuclear cataract, bilateral 02/07/2020    Presbyopia 02/07/2020    Abnormality of right breast on screening mammogram 01/27/2020    High blood pressure 11/13/2019    Microhematuria 04/11/2019    Blood in urine 03/27/2019    Vocal cord contact ulcer 03/12/2019    HPV (human papilloma virus) infection 02/26/2019    Vocal cords swelling 01/13/2015    Acid reflux 07/01/2003    Claustrophobia 01/01/1997    Raynaud's disease 10/01/1984    Perforation of tympanic membrane 04/01/1981    Cleft palate 1970    Deviated septum 1970     She  has a past surgical history that includes Cleft palate repair; Tympanostomy tube placement; Tympanoplasty (Left); Cervical biopsy (N/A, 09/13/2021); and Toenail excision.  Current Outpatient Medications   Medication Sig Dispense Refill    amLODIPine (NORVASC) 10 mg tablet Take 1 tablet (10 mg total) by mouth  "daily 90 tablet 1    atorvastatin (LIPITOR) 10 mg tablet Take 1 tablet by mouth once daily 90 tablet 0    celecoxib (CeleBREX) 100 mg capsule Take 1 capsule (100 mg total) by mouth 2 (two) times a day As needed for joint pain 60 capsule 6    cholecalciferol (VITAMIN D3) 1,000 units tablet Take 1,000 Units by mouth daily      ciclopirox (PENLAC) 8 % solution Apply topically daily at bedtime Apply Penlac at bedtime to the affected toenails daily, wipe off the Penlac from the toenails with acetone or rubbing alcohol on the 7th day and restart the cycle. 6 mL 6    clotrimazole-betamethasone (LOTRISONE) 1-0.05 % cream Apply topically 2 (two) times a day 45 g 1    escitalopram (LEXAPRO) 5 mg tablet Take 1 tablet (5 mg total) by mouth daily 30 tablet 6    hydroxychloroquine (PLAQUENIL) 200 mg tablet Take 2 tablets (400 mg total) by mouth daily at bedtime 180 tablet 1    omeprazole (PriLOSEC) 20 mg delayed release capsule Take 1 capsule by mouth once daily 90 capsule 1    triamcinolone (KENALOG) 0.1 % cream Apply topically 2 (two) times a day 80 g 0     No current facility-administered medications for this visit.   .    Review of Systems   All other systems reviewed and are negative.        Objective:      /74 (BP Location: Right arm, Patient Position: Sitting, Cuff Size: Large)   Pulse 86   Ht 5' 3\" (1.6 m)   Wt 96.2 kg (212 lb)   BMI 37.55 kg/m²          Physical Exam  Vitals reviewed.   Feet:      Right foot:      Toenail Condition: Right toenails are abnormally thick. Fungal disease present.     Left foot:      Toenail Condition: Left toenails are abnormally thick. Fungal disease present.     Comments: Right hallux partial toenail avulsion site has healed.  Multiple toenails bilateral feet noted to be thickened yellowish appearance with subungual debris consistent with tinea unguium.          "

## 2024-03-18 ENCOUNTER — PATIENT MESSAGE (OUTPATIENT)
Dept: RHEUMATOLOGY | Facility: CLINIC | Age: 54
End: 2024-03-18

## 2024-03-18 DIAGNOSIS — M94.0 TIETZE SYNDROME: ICD-10-CM

## 2024-03-18 DIAGNOSIS — M79.10 MYALGIA: ICD-10-CM

## 2024-03-19 ENCOUNTER — TELEPHONE (OUTPATIENT)
Dept: FAMILY MEDICINE CLINIC | Facility: CLINIC | Age: 54
End: 2024-03-19

## 2024-03-19 RX ORDER — HYDROXYCHLOROQUINE SULFATE 200 MG/1
400 TABLET, FILM COATED ORAL
Qty: 180 TABLET | Refills: 1 | Status: SHIPPED | OUTPATIENT
Start: 2024-03-19 | End: 2024-09-15

## 2024-03-21 DIAGNOSIS — E78.5 DYSLIPIDEMIA: ICD-10-CM

## 2024-03-21 RX ORDER — ATORVASTATIN CALCIUM 10 MG/1
TABLET, FILM COATED ORAL
Qty: 90 TABLET | Refills: 0 | Status: SHIPPED | OUTPATIENT
Start: 2024-03-21

## 2024-04-18 DIAGNOSIS — I10 HYPERTENSION, UNSPECIFIED TYPE: ICD-10-CM

## 2024-04-19 RX ORDER — AMLODIPINE BESYLATE 10 MG/1
10 TABLET ORAL DAILY
Qty: 90 TABLET | Refills: 2 | Status: SHIPPED | OUTPATIENT
Start: 2024-04-19

## 2024-06-06 ENCOUNTER — TELEPHONE (OUTPATIENT)
Dept: RHEUMATOLOGY | Facility: CLINIC | Age: 54
End: 2024-06-06

## 2024-06-06 NOTE — TELEPHONE ENCOUNTER
Left voicemail for patient explaining the provider is going through a schedule change and their appointment will need to be rescheduled, apologized for the short notice and advised the patient top call the office back as soon as possible to reschedule appointment

## 2024-06-07 NOTE — TELEPHONE ENCOUNTER
Pt calling to reschedule her appt, relayed message. pt would not like to wait too long for a follow up. Please advise

## 2024-06-15 DIAGNOSIS — E78.5 DYSLIPIDEMIA: ICD-10-CM

## 2024-06-17 RX ORDER — ATORVASTATIN CALCIUM 10 MG/1
TABLET, FILM COATED ORAL
Qty: 90 TABLET | Refills: 0 | Status: SHIPPED | OUTPATIENT
Start: 2024-06-17

## 2024-06-17 NOTE — TELEPHONE ENCOUNTER
Sent blood work Rx to patient in the mail as a reminder to complete 1 week prior to her August apt

## 2024-07-14 DIAGNOSIS — K21.9 GASTROESOPHAGEAL REFLUX DISEASE: ICD-10-CM

## 2024-07-14 RX ORDER — OMEPRAZOLE 20 MG/1
CAPSULE, DELAYED RELEASE ORAL
Qty: 100 CAPSULE | Refills: 0 | Status: SHIPPED | OUTPATIENT
Start: 2024-07-14

## 2024-07-27 DIAGNOSIS — M79.10 MYALGIA: ICD-10-CM

## 2024-07-27 DIAGNOSIS — M94.0 TIETZE SYNDROME: ICD-10-CM

## 2024-07-29 RX ORDER — CELECOXIB 100 MG/1
CAPSULE ORAL
Qty: 60 CAPSULE | Refills: 5 | Status: SHIPPED | OUTPATIENT
Start: 2024-07-29

## 2024-08-12 ENCOUNTER — HOSPITAL ENCOUNTER (OUTPATIENT)
Dept: CT IMAGING | Facility: HOSPITAL | Age: 54
Discharge: HOME | End: 2024-08-12
Attending: FAMILY MEDICINE
Payer: COMMERCIAL

## 2024-08-12 DIAGNOSIS — F17.210 SMOKING GREATER THAN 20 PACK YEARS: ICD-10-CM

## 2024-08-12 PROCEDURE — 71271 CT THORAX LUNG CANCER SCR C-: CPT

## 2024-08-13 ENCOUNTER — APPOINTMENT (OUTPATIENT)
Dept: LAB | Facility: CLINIC | Age: 54
End: 2024-08-13
Payer: COMMERCIAL

## 2024-08-13 DIAGNOSIS — E78.5 DYSLIPIDEMIA: ICD-10-CM

## 2024-08-13 LAB
CHOLEST SERPL-MCNC: 155 MG/DL
HDLC SERPL-MCNC: 68 MG/DL
LDLC SERPL CALC-MCNC: 74 MG/DL (ref 0–100)
TRIGL SERPL-MCNC: 64 MG/DL

## 2024-08-13 PROCEDURE — 80061 LIPID PANEL: CPT

## 2024-08-13 PROCEDURE — 36415 COLL VENOUS BLD VENIPUNCTURE: CPT

## 2024-08-16 ENCOUNTER — OFFICE VISIT (OUTPATIENT)
Dept: FAMILY MEDICINE CLINIC | Facility: CLINIC | Age: 54
End: 2024-08-16
Payer: COMMERCIAL

## 2024-08-16 VITALS
RESPIRATION RATE: 20 BRPM | SYSTOLIC BLOOD PRESSURE: 134 MMHG | BODY MASS INDEX: 38.27 KG/M2 | HEIGHT: 63 IN | WEIGHT: 216 LBS | HEART RATE: 80 BPM | TEMPERATURE: 97.3 F | DIASTOLIC BLOOD PRESSURE: 84 MMHG

## 2024-08-16 DIAGNOSIS — E78.5 DYSLIPIDEMIA: ICD-10-CM

## 2024-08-16 DIAGNOSIS — E55.9 VITAMIN D DEFICIENCY: ICD-10-CM

## 2024-08-16 DIAGNOSIS — F17.210 SMOKING GREATER THAN 20 PACK YEARS: ICD-10-CM

## 2024-08-16 DIAGNOSIS — Z00.00 ANNUAL PHYSICAL EXAM: Primary | ICD-10-CM

## 2024-08-16 DIAGNOSIS — K21.9 GASTROESOPHAGEAL REFLUX DISEASE WITHOUT ESOPHAGITIS: ICD-10-CM

## 2024-08-16 DIAGNOSIS — I10 PRIMARY HYPERTENSION: ICD-10-CM

## 2024-08-16 DIAGNOSIS — F17.200 TOBACCO USE DISORDER: ICD-10-CM

## 2024-08-16 DIAGNOSIS — M94.0 TIETZE SYNDROME: ICD-10-CM

## 2024-08-16 DIAGNOSIS — H72.91 PERFORATION OF RIGHT TYMPANIC MEMBRANE: ICD-10-CM

## 2024-08-16 DIAGNOSIS — K21.9 GASTROESOPHAGEAL REFLUX DISEASE: ICD-10-CM

## 2024-08-16 PROCEDURE — 99396 PREV VISIT EST AGE 40-64: CPT | Performed by: FAMILY MEDICINE

## 2024-08-16 PROCEDURE — 99214 OFFICE O/P EST MOD 30 MIN: CPT | Performed by: FAMILY MEDICINE

## 2024-08-16 RX ORDER — ATORVASTATIN CALCIUM 10 MG/1
10 TABLET, FILM COATED ORAL DAILY
Qty: 90 TABLET | Refills: 1 | Status: SHIPPED | OUTPATIENT
Start: 2024-08-16

## 2024-08-16 NOTE — PATIENT INSTRUCTIONS
"Patient Education     Routine physical for adults   The Basics   Written by the doctors and editors at [facility]   What is a physical? -- A physical is a routine visit, or \"check-up,\" with your doctor. You might also hear it called a \"wellness visit\" or \"preventive visit.\"  During each visit, the doctor will:   Ask about your physical and mental health   Ask about your habits, behaviors, and lifestyle   Do an exam   Give you vaccines if needed   Talk to you about any medicines you take   Give advice about your health   Answer your questions  Getting regular check-ups is an important part of taking care of your health. It can help your doctor find and treat any problems you have. But it's also important for preventing health problems.  A routine physical is different from a \"sick visit.\" A sick visit is when you see a doctor because of a health concern or problem. Since physicals are scheduled ahead of time, you can think about what you want to ask the doctor.  How often should I get a physical? -- It depends on your age and health. In general, for people age 21 years and older:   If you are younger than 50 years, you might be able to get a physical every 3 years.   If you are 50 years or older, your doctor might recommend a physical every year.  If you have an ongoing health condition, like diabetes or high blood pressure, your doctor will probably want to see you more often.  What happens during a physical? -- In general, each visit will include:   Physical exam - The doctor or nurse will check your height, weight, heart rate, and blood pressure. They will also look at your eyes and ears. They will ask about how you are feeling and whether you have any symptoms that bother you.   Medicines - It's a good idea to bring a list of all the medicines you take to each doctor visit. Your doctor will talk to you about your medicines and answer any questions. Tell them if you are having any side effects that bother you. You " "should also tell them if you are having trouble paying for any of your medicines.   Habits and behaviors - This includes:   Your diet   Your exercise habits   Whether you smoke, drink alcohol, or use drugs   Whether you are sexually active   Whether you feel safe at home  Your doctor will talk to you about things you can do to improve your health and lower your risk of health problems. They will also offer help and support. For example, if you want to quit smoking, they can give you advice and might prescribe medicines. If you want to improve your diet or get more physical activity, they can help you with this, too.   Lab tests, if needed - The tests you get will depend on your age and situation. For example, your doctor might want to check your:   Cholesterol   Blood sugar   Iron level   Vaccines - The recommended vaccines will depend on your age, health, and what vaccines you already had. Vaccines are very important because they can prevent certain serious or deadly infections.   Discussion of screening - \"Screening\" means checking for diseases or other health problems before they cause symptoms. Your doctor can recommend screening based on your age, risk, and preferences. This might include tests to check for:   Cancer, such as breast, prostate, cervical, ovarian, colorectal, prostate, lung, or skin cancer   Sexually transmitted infections, such as chlamydia and gonorrhea   Mental health conditions like depression and anxiety  Your doctor will talk to you about the different types of screening tests. They can help you decide which screenings to have. They can also explain what the results might mean.   Answering questions - The physical is a good time to ask the doctor or nurse questions about your health. If needed, they can refer you to other doctors or specialists, too.  Adults older than 65 years often need other care, too. As you get older, your doctor will talk to you about:   How to prevent falling at " home   Hearing or vision tests   Memory testing   How to take your medicines safely   Making sure that you have the help and support you need at home  All topics are updated as new evidence becomes available and our peer review process is complete.  This topic retrieved from [employer] on: May 02, 2024.  Topic 668032 Version 1.0  Release: 32.4.3 - C32.122  © 2024 UpToDate, Inc. and/or its affiliates. All rights reserved.  Consumer Information Use and Disclaimer   Disclaimer: This generalized information is a limited summary of diagnosis, treatment, and/or medication information. It is not meant to be comprehensive and should be used as a tool to help the user understand and/or assess potential diagnostic and treatment options. It does NOT include all information about conditions, treatments, medications, side effects, or risks that may apply to a specific patient. It is not intended to be medical advice or a substitute for the medical advice, diagnosis, or treatment of a health care provider based on the health care provider's examination and assessment of a patient's specific and unique circumstances. Patients must speak with a health care provider for complete information about their health, medical questions, and treatment options, including any risks or benefits regarding use of medications. This information does not endorse any treatments or medications as safe, effective, or approved for treating a specific patient. UpToDate, Inc. and its affiliates disclaim any warranty or liability relating to this information or the use thereof.The use of this information is governed by the Terms of Use, available at https://www.[employer].com/en/know/clinical-effectiveness-terms. 2024© UpToDate, Inc. and its affiliates and/or licensors. All rights reserved.  Copyright   © 2024 UpToDate, Inc. and/or its affiliates. All rights reserved.

## 2024-08-16 NOTE — PROGRESS NOTES
Adult Annual Physical  Name: Helen Ware      : 1970      MRN: [id number]  Encounter Provider: Sin Cristina DO  Encounter Date: 2024   Encounter department: [facility]IMARY [facility]    Assessment & Plan   1. Annual physical exam  2. Dyslipidemia  -     atorvastatin (LIPITOR) 10 mg tablet; Take 1 tablet (10 mg total) by mouth daily  -     Comprehensive metabolic panel; Future; Expected date: 2024  -     Lipid Panel with Direct LDL reflex; Future  3. Gastroesophageal reflux disease  -     omeprazole (PriLOSEC) 20 mg delayed release capsule; Take 1 capsule (20 mg total) by mouth daily  -     CBC and differential; Future; Expected date: 2024  -     Comprehensive metabolic panel; Future; Expected date: 2024  4. Primary hypertension  -     TSH, 3rd generation; Future; Expected date: 2024  -     CBC and differential; Future  -     Comprehensive metabolic panel; Future  -     TSH, 3rd generation; Future  5. Smoking greater than 20 pack years  -     CT lung screening program; Future; Expected date: 2024  6. Vitamin D deficiency  -     Vitamin D 25 hydroxy; Future; Expected date: 2024  -     Vitamin D 25 hydroxy; Future  7. Tietze syndrome  8. Tobacco use disorder  -     Lipid Panel with Direct LDL reflex; Future  9. Gastroesophageal reflux disease without esophagitis  10. Perforation of right tympanic membrane  History of perforation of the right dependent membrane    Costochondritis chronic the patient currently is taking Plaquenil as advised that she had turmeric as a supplement    Morbid obesity with a BMI of 38.26    The patient defers mammography for breast cancer screening defers colonoscopy or Cologuard for colon cancer screening he is agreeable to a low-dose CT of the chest for lung cancer screening    Dyslipidemia with a lipid panel on desirable profile on atorvastatin with a total cholesterol of 155 triglycerides 64 HDL of 68 and LDL of  74    Low-dose CT of the chest reviewed with the patient shows chronic small nodules recommend repeat in 1 year  Immunizations and preventive care screenings were discussed with patient today. Appropriate education was printed on patient's after visit summary.    Counseling:  Dental Health: discussed importance of regular tooth brushing, flossing, and dental visits.  Injury prevention: discussed safety/seat belts, safety helmets, smoke detectors, carbon dioxide detectors, and smoking near bedding or upholstery.  Sexual health: discussed sexually transmitted diseases, partner selection, use of condoms, avoidance of unintended pregnancy, and contraceptive alternatives.  Exercise: the importance of regular exercise/physical activity was discussed. Recommend exercise 3-5 times per week for at least 30 minutes.          History of Present Illness     Adult Annual Physical:  Patient presents for annual physical.     Diet and Physical Activity:  - Diet/Nutrition: poor diet.  - Exercise: no formal exercise.    General Health:  - Sleep: sleeps poorly and 4-6 hours of sleep on average.  - Hearing: decreased hearing right ear.  - Vision: wears glasses.  - Dental: no dental visits for > 1 year, brushes teeth three times daily and does not floss.    /GYN Health:  - Follows with GYN: yes.   - Menopause: postmenopausal.   - History of STDs: yes  - Contraception: menopause.      Advanced Care Planning:  - Has an advanced directive?: yes    - Has a durable medical POA?: yes    - ACP document given to patient?: no      Review of Systems   Constitutional:  Negative for chills and fever.   HENT:  Negative for ear pain and sore throat.    Eyes:  Negative for pain and visual disturbance.   Respiratory:  Negative for cough and shortness of breath.    Cardiovascular:  Negative for chest pain and palpitations.   Gastrointestinal:  Negative for abdominal pain and vomiting.   Genitourinary:  Negative for dysuria and hematuria.  "  Musculoskeletal:  Negative for arthralgias and back pain.   Skin:  Negative for color change and rash.   Neurological:  Negative for seizures and syncope.   All other systems reviewed and are negative.        Objective     /84   Pulse 80   Temp (!) 97.3 °F (36.3 °C)   Resp 20   Ht 5' 3\" (1.6 m)   Wt 98 kg (216 lb)   BMI 38.26 kg/m²     Physical Exam  I discussed with her that she is a candidate for lung cancer CT screening.     The following Shared Decision-Making points were covered:  Benefits of screening were discussed, including the rates of reduction in death from lung cancer and other causes.  Harms of screening were reviewed, including false positive tests, radiation exposure levels, risks of invasive procedures, risks of complications of screening, and risk of overdiagnosis.  I counseled on the importance of adherence to annual lung cancer LDCT screening, impact of co-morbidities, and ability or willingness to undergo diagnosis and treatment.  I counseled on the importance of maintaining abstinence as a former smoker or was counseled on the importance of smoking cessation if a current smoker    Review of Eligibility Criteria: She meets all of the criteria for Lung Cancer Screening.   She is 54 y.o.   She has 20 pack year tobacco history and is a current smoker or has quit within the past 15 years  She presents no signs or symptoms of lung cancer    After discussion, the patient decided to elect lung cancer screening.  " discussed with her that she is a candidate for lung cancer CT screening.     The following Shared Decision-Making points were covered:  Benefits of screening were discussed, including the rates of reduction in death from lung cancer and other causes.  Harms of screening were reviewed, including false positive tests, radiation exposure levels, risks of invasive procedures, risks of complications of screening, and risk of overdiagnosis.  I counseled on the importance of adherence to annual lung cancer LDCT screening, impact of co-morbidities, and ability or willingness to undergo diagnosis and treatment.  I counseled on the importance of maintaining abstinence as a former smoker or was counseled on the importance of smoking cessation if a current smoker    Review of Eligibility Criteria: She meets all of the criteria for Lung Cancer Screening.   She is 54 y.o.   She has 20 pack year tobacco history and is a current smoker or has quit within the past 15 years  She presents no signs or symptoms of lung cancer    After discussion, the patient decided to elect lung cancer screening.

## 2024-09-06 DIAGNOSIS — M79.10 MYALGIA: ICD-10-CM

## 2024-09-06 RX ORDER — ESCITALOPRAM OXALATE 5 MG/1
5 TABLET ORAL DAILY
Qty: 30 TABLET | Refills: 5 | Status: SHIPPED | OUTPATIENT
Start: 2024-09-06

## 2024-10-22 DIAGNOSIS — E78.5 DYSLIPIDEMIA: ICD-10-CM

## 2024-10-23 RX ORDER — ATORVASTATIN CALCIUM 10 MG/1
10 TABLET, FILM COATED ORAL DAILY
Qty: 90 TABLET | Refills: 0 | Status: SHIPPED | OUTPATIENT
Start: 2024-10-23

## 2024-10-31 ENCOUNTER — OPTICAL OFFICE (OUTPATIENT)
Dept: URBAN - NONMETROPOLITAN AREA CLINIC 4 | Facility: CLINIC | Age: 54
Setting detail: OPHTHALMOLOGY
End: 2024-10-31

## 2024-10-31 DIAGNOSIS — H52.03: ICD-10-CM

## 2024-10-31 PROCEDURE — V2200 LENS SPHER BIFOC PLANO 4.00D: HCPCS | Performed by: OPTOMETRIST

## 2024-10-31 PROCEDURE — V2020 VISION SVCS FRAMES PURCHASES: HCPCS | Performed by: OPTOMETRIST

## 2024-10-31 PROCEDURE — V2200 LENS SPHER BIFOC PLANO 4.00D: HCPCS | Mod: LT | Performed by: OPTOMETRIST

## 2024-12-20 ENCOUNTER — TELEPHONE (OUTPATIENT)
Age: 54
End: 2024-12-20

## 2024-12-20 ENCOUNTER — PREP FOR PROCEDURE (OUTPATIENT)
Age: 54
End: 2024-12-20

## 2024-12-20 DIAGNOSIS — Z12.11 SCREENING FOR COLON CANCER: Primary | ICD-10-CM

## 2024-12-20 NOTE — LETTER
Attached are your prep instructions for your upcoming procedure on 1/8/25. If you have any questions or concerns please contact us at [phone].    Thank you,     [facility]      Medicine Instructions for Adults with Diabetes who Need a Bowel Prep       Follow these instructions when a BOWEL PREP is required for your procedure or surgery!    NOTE:   GLP-1 Agonists taken weekly: do not take in the 7 days before your procedure   SGLT-2 Inhibitors: do not take in the 4 days before your procedure     On the Day Before Surgery/Procedure  If you are having a procedure (e.g. Colonoscopy) or surgery that requires a bowel prep and you may have at least a clear liquid diet, follow the directions below based on the type of medicine you take for your diabetes.     Type of Medicine You Take Examples What to do   Pre-Mixed Insulin - Intermediate Acting Humalog® 75/25, Humulin® 70/30, Novolog® 70/30, Regular Insulin Take ½ your regular dose the evening before your procedure   Rapid/Fast Acting Insulin Humalog® U200, NovoLog®, Apidra®, Fiasp® Take ½ your regular dose the evening before your procedure.   Long-Acting Insulin Lantus®, Levemir®, Tresiba®, Toujeo®, Basaglar® Take your FULL regular dose the day before procedure   Oral Sulfonylurea Glipizide/Glimepiride/Glucotrol® Take ½ your regular dose the evening before your procedure   Other Oral Diabetes Medicines Metformin®, Glucophage®, Glucophage XR®, Riomet®, Glumetza®), Actose®, Avandia®, Glyset®, Prandin® Take your regular dose with dinner in the evening before your procedure   GLP-1 Agonists AdlyxinÒ, ByettaÒ, BydureonÒ, OzempicÒ, SoliquaÒ, TanzeumÒ, TrulicityÒ, VictozaÒ, Saxenda®, Rybelsus® If taken daily, take as normal    If taken weekly, do not take this medicine for 7 days before your procedure including the day of the procedure (resume taking after the procedure)   SGLT-2 Inhibitors Jardiance®, Invokana®,  Farxiga®,   Steglatro®, Brenzavvy®, Qtern®, Segluromet®, Glyxambi®, Synjardy®, Synjardy XR®, Invokamet®, Invokamet XR®, Trijary XR®, Xigduo XR®, Steglujan® Do not take for 4 days before your procedure including the day of the procedure (resume taking after the procedure)                More information continued on back                    Medicine Instructions for Adults with Diabetes who Need a Bowel Prep  Page 2      On the Day of Surgery/Procedure  Follow the directions below based on the type of medicine you take for your diabetes.     Type of Medicine You Take Examples What to do   Long-Acting Insulin Lantus®, Levemir®, Tresiba®, Toujeo®, Basaglar®, Semglee®   If you usually take your Long-Acting Insulin in the morning, take the full dose as scheduled.   GLP-1 Agonists AdlyxinÒ, ByettaÒ, BydureonÒ, OzempicÒ, SoliquaÒ, TanzeumÒ, TrulicityÒ, VictozaÒ, Saxenda®, Rybelsus® Do NOT take this medicine on the day of your procedure (resume taking after the procedure)       On the Day of Surgery/Procedure (continued)  Except for the morning Long-Acting Insulin, DO NOT take ANY diabetic medicine on the day of your procedure unless you were instructed by the doctor who manages your diabetes medicines.    Continue to check your blood sugars.  If you have an insulin pump, ask your endocrinologist for instructions at least 3 days before your procedure. NOTE: If you are not able to ask your endocrinologist in advance, on the day of the procedure set your insulin pump to your basal rate only. Bring your insulin pump supplies to the hospital.     If you have any questions about taking your diabetes medicines prior to your procedure, please contact the doctor who manages your diabetes medicines.

## 2024-12-20 NOTE — TELEPHONE ENCOUNTER
Scheduled date of colonoscopy (as of today):  1/8/25    Physician performing colonoscopy:  Krish    Location of colonoscopy:   [facility]    Bowel prep reviewed with patient:  JORJE/KIRA    Instructions reviewed with patient by: yvonne    Clearances: na    OA  Screening  MyChart

## 2024-12-20 NOTE — TELEPHONE ENCOUNTER
12/20/24  Screened by: Maggie Talamantes MA    Referring Provider self    Pre- Screening:     There is no height or weight on file to calculate BMI.  Has patient been referred for a routine screening Colonoscopy? yes  Is the patient between 45-75 years old? yes      Previous Colonoscopy no   If yes:    Date:     Facility:     Reason:       Does the patient want to see a Gastroenterologist prior to their procedure OR are they having any GI symptoms? no    Has the patient been hospitalized or had abdominal surgery in the past 6 months? no    Does the patient use supplemental oxygen? no    Does the patient take Coumadin, Lovenox, Plavix, Elliquis, Xarelto, or other blood thinning medication? no    Has the patient had a stroke, cardiac event, or stent placed in the past year? no    .

## 2025-01-02 DIAGNOSIS — I10 HYPERTENSION, UNSPECIFIED TYPE: ICD-10-CM

## 2025-01-02 RX ORDER — AMLODIPINE BESYLATE 10 MG/1
10 TABLET ORAL DAILY
Qty: 90 TABLET | Refills: 0 | Status: SHIPPED | OUTPATIENT
Start: 2025-01-02

## 2025-01-08 ENCOUNTER — ANESTHESIA EVENT (OUTPATIENT)
Dept: PERIOP | Facility: HOSPITAL | Age: 55
End: 2025-01-08
Payer: COMMERCIAL

## 2025-01-08 ENCOUNTER — ANESTHESIA (OUTPATIENT)
Dept: PERIOP | Facility: HOSPITAL | Age: 55
End: 2025-01-08
Payer: COMMERCIAL

## 2025-01-08 ENCOUNTER — HOSPITAL ENCOUNTER (OUTPATIENT)
Dept: PERIOP | Facility: HOSPITAL | Age: 55
Setting detail: OUTPATIENT SURGERY
Discharge: HOME | End: 2025-01-08
Attending: INTERNAL MEDICINE
Payer: COMMERCIAL

## 2025-01-08 VITALS
TEMPERATURE: 97.7 F | HEART RATE: 77 BPM | OXYGEN SATURATION: 97 % | RESPIRATION RATE: 18 BRPM | SYSTOLIC BLOOD PRESSURE: 119 MMHG | DIASTOLIC BLOOD PRESSURE: 81 MMHG | BODY MASS INDEX: 38.27 KG/M2 | HEIGHT: 63 IN | WEIGHT: 216 LBS

## 2025-01-08 DIAGNOSIS — Z12.11 SCREENING FOR COLON CANCER: ICD-10-CM

## 2025-01-08 DIAGNOSIS — R19.5 POSITIVE COLORECTAL CANCER SCREENING USING COLOGUARD TEST: ICD-10-CM

## 2025-01-08 PROCEDURE — 88305 TISSUE EXAM BY PATHOLOGIST: CPT | Performed by: PATHOLOGY

## 2025-01-08 PROCEDURE — 45385 COLONOSCOPY W/LESION REMOVAL: CPT | Performed by: INTERNAL MEDICINE

## 2025-01-08 RX ORDER — SODIUM CHLORIDE, SODIUM LACTATE, POTASSIUM CHLORIDE, CALCIUM CHLORIDE 600; 310; 30; 20 MG/100ML; MG/100ML; MG/100ML; MG/100ML
125 INJECTION, SOLUTION INTRAVENOUS CONTINUOUS
Status: CANCELLED | OUTPATIENT
Start: 2025-01-08

## 2025-01-08 RX ORDER — SODIUM CHLORIDE, SODIUM LACTATE, POTASSIUM CHLORIDE, CALCIUM CHLORIDE 600; 310; 30; 20 MG/100ML; MG/100ML; MG/100ML; MG/100ML
INJECTION, SOLUTION INTRAVENOUS CONTINUOUS PRN
Status: DISCONTINUED | OUTPATIENT
Start: 2025-01-08 | End: 2025-01-08

## 2025-01-08 RX ORDER — PROPOFOL 10 MG/ML
INJECTION, EMULSION INTRAVENOUS AS NEEDED
Status: DISCONTINUED | OUTPATIENT
Start: 2025-01-08 | End: 2025-01-08

## 2025-01-08 RX ORDER — FENTANYL CITRATE 50 UG/ML
INJECTION, SOLUTION INTRAMUSCULAR; INTRAVENOUS AS NEEDED
Status: DISCONTINUED | OUTPATIENT
Start: 2025-01-08 | End: 2025-01-08

## 2025-01-08 RX ORDER — LIDOCAINE HYDROCHLORIDE 20 MG/ML
INJECTION, SOLUTION EPIDURAL; INFILTRATION; INTRACAUDAL; PERINEURAL AS NEEDED
Status: DISCONTINUED | OUTPATIENT
Start: 2025-01-08 | End: 2025-01-08

## 2025-01-08 RX ORDER — SODIUM CHLORIDE, SODIUM LACTATE, POTASSIUM CHLORIDE, CALCIUM CHLORIDE 600; 310; 30; 20 MG/100ML; MG/100ML; MG/100ML; MG/100ML
125 INJECTION, SOLUTION INTRAVENOUS CONTINUOUS
Status: DISCONTINUED | OUTPATIENT
Start: 2025-01-08 | End: 2025-01-12 | Stop reason: HOSPADM

## 2025-01-08 RX ORDER — PROPOFOL 10 MG/ML
INJECTION, EMULSION INTRAVENOUS CONTINUOUS PRN
Status: DISCONTINUED | OUTPATIENT
Start: 2025-01-08 | End: 2025-01-08

## 2025-01-08 RX ORDER — ONDANSETRON 2 MG/ML
4 INJECTION INTRAMUSCULAR; INTRAVENOUS ONCE AS NEEDED
Status: CANCELLED | OUTPATIENT
Start: 2025-01-08

## 2025-01-08 RX ADMIN — SODIUM CHLORIDE, SODIUM LACTATE, POTASSIUM CHLORIDE, AND CALCIUM CHLORIDE 125 ML/HR: .6; .31; .03; .02 INJECTION, SOLUTION INTRAVENOUS at 10:46

## 2025-01-08 RX ADMIN — LIDOCAINE HYDROCHLORIDE 50 MG: 20 INJECTION, SOLUTION EPIDURAL; INFILTRATION; INTRACAUDAL; PERINEURAL at 11:59

## 2025-01-08 RX ADMIN — PROPOFOL 100 MG: 10 INJECTION, EMULSION INTRAVENOUS at 12:18

## 2025-01-08 RX ADMIN — PROPOFOL 150 MCG/KG/MIN: 10 INJECTION, EMULSION INTRAVENOUS at 11:59

## 2025-01-08 RX ADMIN — PROPOFOL 100 MG: 10 INJECTION, EMULSION INTRAVENOUS at 12:10

## 2025-01-08 RX ADMIN — FENTANYL CITRATE 50 MCG: 50 INJECTION, SOLUTION INTRAMUSCULAR; INTRAVENOUS at 12:01

## 2025-01-08 RX ADMIN — PROPOFOL 100 MG: 10 INJECTION, EMULSION INTRAVENOUS at 11:59

## 2025-01-08 RX ADMIN — SODIUM CHLORIDE, SODIUM LACTATE, POTASSIUM CHLORIDE, AND CALCIUM CHLORIDE: .6; .31; .03; .02 INJECTION, SOLUTION INTRAVENOUS at 11:52

## 2025-01-08 RX ADMIN — PROPOFOL 40 MG: 10 INJECTION, EMULSION INTRAVENOUS at 12:01

## 2025-01-08 NOTE — H&P
History and Physical - [facility]  Helen Ware 54 y.o. female MRN: [id number]                  HPI: Helen Ware is a 54 y.o. year old female who presents for CRC screening      REVIEW OF SYSTEMS: Per the HPI, and otherwise unremarkable.    Historical Information   Past Medical History:   Diagnosis Date    Acid reflux     Anxiety     Anxiety     Arthritis 4/13/20    in the neck    Bacterial vaginosis     Cleft palate     Costochondritis     DDD (degenerative disc disease), cervical     Frequency of urination     GERD (gastroesophageal reflux disease)     H/O colposcopy with cervical biopsy 03/27/2019    High arches     HPV (human papilloma virus) anogenital infection     HPV (human papilloma virus) infection     Hyperhidrosis     Hyperlipidemia     Hypertension     Ingrown toenail     Perforated ear drum     Raynaud's disease     Tobacco dependence     Umbilical hernia     Vocal cords swelling     Wears glasses      Past Surgical History:   Procedure Laterality Date    CERVICAL BIOPSY N/A 09/13/2021    Procedure: CONE BIOPSY OF THE CERVIX;  Surgeon: Christiano Dubois MD;  Location:  MAIN OR;  Service: Gynecology    CLEFT PALATE REPAIR      TOENAIL EXCISION      TYMPANOPLASTY Left     TYMPANOSTOMY TUBE PLACEMENT       Social History   Social History     Substance and Sexual Activity   Alcohol Use Not Currently     Social History     Substance and Sexual Activity   Drug Use Never     Social History     Tobacco Use   Smoking Status Every Day    Current packs/day: 1.00    Average packs/day: 1 pack/day for 40.0 years (40.0 ttl pk-yrs)    Types: Cigarettes    Start date: 1/1/1985   Smokeless Tobacco Never   Tobacco Comments    04/18/2022-- max 1 pack per day     Family History   Problem Relation Age of Onset    Lung cancer Mother     Mental illness Mother     Cancer Mother         I believe it was lung cancer    Early death Mother         Passed away 3/4/98 (54 yrs old)    Miscarriages /  "Stillbirths Mother     Heart disease Father     Hypertension Father     Early death Father         Passed away 8/27/07 (67 yrs old)    Uterine cancer Sister     No Known Problems Maternal Grandmother     No Known Problems Maternal Grandfather     No Known Problems Paternal Grandmother     No Known Problems Paternal Grandfather     No Known Problems Maternal Aunt     Cancer Paternal Aunt         unknown origin     Cancer Sister     Stroke Sister     Miscarriages / Stillbirths Sister        Meds/Allergies     Not in a hospital admission.    Allergies   Allergen Reactions    Flexeril [Cyclobenzaprine] Other (See Comments)     Patient reported \"Dry Mouth\"    Medical Tape Hives       Objective     Blood pressure 134/75, pulse (!) 114, temperature 97.7 °F (36.5 °C), temperature source Temporal, resp. rate 18, height 5' 3\" (1.6 m), weight 98 kg (216 lb), SpO2 96%.      PHYSICAL EXAMINATION:    General Appearance:   Alert, cooperative, no distress   HEENT:  Normocephalic, atraumatic, anicteric. Neck supple, symmetrical, trachea midline.   Lungs:   Equal chest rise and unlabored breathing, normal effort, no coughing.   Cardiovascular:   No visualized JVD.   Abdomen:   No abdominal distension.   Skin:   No jaundice, rashes, or lesions.    Musculoskeletal:   Normal range of motion visualized.   Psych:  Normal affect and normal insight.   Neuro:  Alert and appropriate.           ASSESSMENT/PLAN:  This is a 54 y.o. year old female here for colonoscopy, and she is stable and optimized for her procedure.        "

## 2025-01-08 NOTE — ANESTHESIA POSTPROCEDURE EVALUATION
Post-Op Assessment Note    CV Status:  Stable    Pain management: adequate       Mental Status:  Alert and awake   Hydration Status:  Euvolemic   PONV Controlled:  Controlled   Airway Patency:  Patent     Post Op Vitals Reviewed: Yes    No anethesia notable event occurred.    Staff: Anesthesiologist, with CRNAs           Last Filed PACU Vitals:  Vitals Value Taken Time   Temp 98.8    Pulse 91 01/08/25 1231   BP 96/61    Resp 21 01/08/25 1231   SpO2 95 % 01/08/25 1231   Vitals shown include unfiled device data.

## 2025-01-08 NOTE — ANESTHESIA PREPROCEDURE EVALUATION
Procedure:  COLONOSCOPY    Relevant Problems   CARDIO   (+) High blood pressure   (+) Raynaud's disease      GI/HEPATIC   (+) Acid reflux   (+) Dysphagia      MUSCULOSKELETAL   (+) Degeneration of intervertebral disc of cervical region   (+) Degenerative cervical disc   (+) Spondylosis of cervical region without myelopathy or radiculopathy      NEURO/PSYCH   (+) Bilateral numbness and tingling of arms and legs        Physical Exam    Airway    Mallampati score: I  TM Distance: >3 FB  Neck ROM: full     Dental   No notable dental hx     Cardiovascular      Pulmonary      Other Findings  post-pubertal.      Anesthesia Plan  ASA Score- 2     Anesthesia Type- IV sedation with anesthesia with ASA Monitors.         Additional Monitors:     Airway Plan:            Plan Factors-Exercise tolerance (METS): >4 METS.    Chart reviewed.    Patient summary reviewed.    Patient is a current smoker.  Patient instructed to abstain from smoking on day of procedure. Patient smoked on day of surgery.    There is medical exclusion for perioperative obstructive sleep apnea risk education.        Induction- intravenous.    Postoperative Plan-         Informed Consent- Anesthetic plan and risks discussed with patient.  I personally reviewed this patient with the CRNA. Discussed and agreed on the Anesthesia Plan with the CRNA..

## 2025-01-10 ENCOUNTER — RESULTS FOLLOW-UP (OUTPATIENT)
Dept: GASTROENTEROLOGY | Facility: CLINIC | Age: 55
End: 2025-01-10

## 2025-01-10 DIAGNOSIS — M79.10 MYALGIA: ICD-10-CM

## 2025-01-10 DIAGNOSIS — M94.0 TIETZE SYNDROME: ICD-10-CM

## 2025-01-10 PROCEDURE — 88305 TISSUE EXAM BY PATHOLOGIST: CPT | Performed by: PATHOLOGY

## 2025-01-15 RX ORDER — CELECOXIB 100 MG/1
CAPSULE ORAL
Qty: 60 CAPSULE | Refills: 6 | Status: SHIPPED | OUTPATIENT
Start: 2025-01-15

## 2025-01-16 DIAGNOSIS — E78.5 DYSLIPIDEMIA: ICD-10-CM

## 2025-01-16 RX ORDER — ATORVASTATIN CALCIUM 10 MG/1
10 TABLET, FILM COATED ORAL DAILY
Qty: 90 TABLET | Refills: 1 | Status: SHIPPED | OUTPATIENT
Start: 2025-01-16

## 2025-01-27 DIAGNOSIS — K21.9 GASTROESOPHAGEAL REFLUX DISEASE: ICD-10-CM

## 2025-02-10 ENCOUNTER — TELEPHONE (OUTPATIENT)
Dept: FAMILY MEDICINE CLINIC | Facility: CLINIC | Age: 55
End: 2025-02-10

## 2025-02-10 NOTE — TELEPHONE ENCOUNTER
Attempted to call patient to reschedule 02/10/2025 appointment. Doctor unavailable. Advised to call back.

## 2025-02-10 NOTE — TELEPHONE ENCOUNTER
Called patient and left message on voicemail to contact the office to reschedule due to provider being out of the office today.

## 2025-02-21 ENCOUNTER — DOCTOR'S OFFICE (OUTPATIENT)
Dept: URBAN - NONMETROPOLITAN AREA CLINIC 1 | Facility: CLINIC | Age: 55
Setting detail: OPHTHALMOLOGY
End: 2025-02-21
Payer: COMMERCIAL

## 2025-02-21 DIAGNOSIS — H40.033: ICD-10-CM

## 2025-02-21 DIAGNOSIS — Z79.899: ICD-10-CM

## 2025-02-21 PROCEDURE — 99214 OFFICE O/P EST MOD 30 MIN: CPT | Performed by: OPHTHALMOLOGY

## 2025-02-21 PROCEDURE — 92083 EXTENDED VISUAL FIELD XM: CPT | Performed by: OPHTHALMOLOGY

## 2025-02-21 PROCEDURE — 92020 GONIOSCOPY: CPT | Performed by: OPHTHALMOLOGY

## 2025-02-21 ASSESSMENT — REFRACTION_CURRENTRX
OD_SPHERE: +1.75
OD_ADD: +1.75
OD_CYLINDER: SPH
OD_ADD: +2.25
OS_SPHERE: +2.25
OS_OVR_VA: 20/
OS_AXIS: 180
OS_CYLINDER: -0.25
OD_VPRISM_DIRECTION: BF
OD_OVR_VA: 20/
OD_ADD: +1.50
OS_SPHERE: +2.00
OD_SPHERE: +1.75
OS_OVR_VA: 20/
OS_OVR_VA: 20/
OD_SPHERE: +2.00
OS_VPRISM_DIRECTION: BF
OS_ADD: +1.75
OD_AXIS: 002
OD_CYLINDER: -0.25
OS_CYLINDER: -0.25
OS_CYLINDER: 0.00
OS_ADD: +2.25
OD_CYLINDER: -0.25
OD_AXIS: 006
OD_VPRISM_DIRECTION: BF
OD_OVR_VA: 20/
OD_OVR_VA: 20/
OS_AXIS: 021
OS_ADD: +1.50
OS_VPRISM_DIRECTION: BF
OS_SPHERE: +2.25
OS_AXIS: 171
OS_VPRISM_DIRECTION: BF
OD_VPRISM_DIRECTION: BF

## 2025-02-21 ASSESSMENT — REFRACTION_MANIFEST
OD_VA1: 20/25+2
OD_VA2: 20/25+2
OD_ADD: +2.25
OD_CYLINDER: SPH
OS_CYLINDER: SPH
OS_SPHERE: +2.25
OS_VA1: 20/25+2
OD_SPHERE: +2.00
OS_ADD: +2.25
OS_VA2: 20/25+2

## 2025-02-21 ASSESSMENT — VISUAL ACUITY
OD_BCVA: 20/25-2
OS_BCVA: 20/25-2

## 2025-02-21 ASSESSMENT — REFRACTION_AUTOREFRACTION
OD_CYLINDER: -1.00
OS_SPHERE: +4.25
OS_AXIS: 086
OS_CYLINDER: -2.00
OD_AXIS: 101
OD_SPHERE: +3.25

## 2025-02-21 ASSESSMENT — KERATOMETRY
OS_K1POWER_DIOPTERS: 42.75
OS_K2POWER_DIOPTERS: 44.00
OD_K2POWER_DIOPTERS: 43.75
OD_AXISANGLE_DEGREES: 053
OS_AXISANGLE_DEGREES: 161
OD_K1POWER_DIOPTERS: 42.75

## 2025-02-21 ASSESSMENT — DRY EYES - PHYSICIAN NOTES
OS_GENERALCOMMENTS: TRACE PEE
OD_GENERALCOMMENTS: TRACE PEE

## 2025-02-21 ASSESSMENT — CONFRONTATIONAL VISUAL FIELD TEST (CVF)
OD_FINDINGS: FULL
OS_FINDINGS: FULL

## 2025-02-26 ENCOUNTER — OFFICE VISIT (OUTPATIENT)
Dept: FAMILY MEDICINE CLINIC | Facility: CLINIC | Age: 55
End: 2025-02-26
Payer: COMMERCIAL

## 2025-02-26 VITALS
TEMPERATURE: 98.1 F | BODY MASS INDEX: 38.45 KG/M2 | HEART RATE: 76 BPM | RESPIRATION RATE: 16 BRPM | DIASTOLIC BLOOD PRESSURE: 84 MMHG | WEIGHT: 217 LBS | HEIGHT: 63 IN | SYSTOLIC BLOOD PRESSURE: 134 MMHG

## 2025-02-26 DIAGNOSIS — F17.200 TOBACCO USE DISORDER: ICD-10-CM

## 2025-02-26 DIAGNOSIS — Z00.00 ANNUAL PHYSICAL EXAM: Primary | ICD-10-CM

## 2025-02-26 DIAGNOSIS — K21.9 GASTROESOPHAGEAL REFLUX DISEASE WITHOUT ESOPHAGITIS: ICD-10-CM

## 2025-02-26 DIAGNOSIS — I10 PRIMARY HYPERTENSION: ICD-10-CM

## 2025-02-26 DIAGNOSIS — H72.91 PERFORATION OF RIGHT TYMPANIC MEMBRANE: ICD-10-CM

## 2025-02-26 DIAGNOSIS — M94.0 TIETZE SYNDROME: ICD-10-CM

## 2025-02-26 DIAGNOSIS — E78.5 DYSLIPIDEMIA: ICD-10-CM

## 2025-02-26 PROBLEM — L72.3 SEBACEOUS CYST OF RIGHT AXILLA: Status: RESOLVED | Noted: 2020-09-20 | Resolved: 2025-02-26

## 2025-02-26 PROBLEM — L08.9 INFECTED EPIDERMOID CYST: Status: RESOLVED | Noted: 2021-12-27 | Resolved: 2025-02-26

## 2025-02-26 PROBLEM — L72.0 INFECTED EPIDERMOID CYST: Status: RESOLVED | Noted: 2021-12-27 | Resolved: 2025-02-26

## 2025-02-26 PROBLEM — R31.29 MICROHEMATURIA: Status: RESOLVED | Noted: 2019-04-11 | Resolved: 2025-02-26

## 2025-02-26 PROCEDURE — 99213 OFFICE O/P EST LOW 20 MIN: CPT | Performed by: FAMILY MEDICINE

## 2025-02-26 PROCEDURE — 99396 PREV VISIT EST AGE 40-64: CPT | Performed by: FAMILY MEDICINE

## 2025-02-26 RX ORDER — PREDNISOLONE ACETATE 10 MG/ML
SUSPENSION/ DROPS OPHTHALMIC
COMMUNITY
Start: 2025-02-21 | End: 2025-02-26

## 2025-02-26 NOTE — PROGRESS NOTES
Adult Annual Physical  Name: Helen Ware      : 1970      MRN: [id number]  Encounter Provider: Sin Cristina DO  Encounter Date: 2025   Encounter department: [facility]    Assessment & Plan  Annual physical exam         Gastroesophageal reflux disease without esophagitis  Prilosec 20 mg minimizes symptoms       Primary hypertension  With a blood pressure controlled on the current regimen       Dyslipidemia  Currently on Lipitor 10 mg daily laboratories pending       Tietze syndrome  Patient takes Celebrex on an as-needed basis       Tobacco use disorder  The patient is willing to try the nicotine patch for smoking cessation       Perforation of right tympanic membrane  Affecting hearing       Immunizations and preventive care screenings were discussed with patient today. Appropriate education was printed on patient's after visit summary.    Counseling:  Alcohol/drug use: discussed moderation in alcohol intake, the recommendations for healthy alcohol use, and avoidance of illicit drug use.  Dental Health: discussed importance of regular tooth brushing, flossing, and dental visits.  Injury prevention: discussed safety/seat belts, safety helmets, smoke detectors, carbon monoxide detectors, and smoking near bedding or upholstery.  Sexual health: discussed sexually transmitted diseases, partner selection, use of condoms, avoidance of unintended pregnancy, and contraceptive alternatives.  Exercise: the importance of regular exercise/physical activity was discussed. Recommend exercise 3-5 times per week for at least 30 minutes.       Depression Screening and Follow-up Plan: Patient was screened for depression during today's encounter. They screened negative with a PHQ-2 score of 0.      Tobacco Cessation Counseling: Tobacco cessation counseling was provided. The patient is sincerely urged to quit consumption of tobacco. She is not ready to quit tobacco. Medication options and  "side effects of medication discussed. Patient refused medication. Nicotine patch was prescribed.         History of Present Illness     Adult Annual Physical:  Patient presents for annual physical.     Diet and Physical Activity:  - Diet/Nutrition: poor diet.  - Exercise: no formal exercise.    Depression Screening:  - PHQ-2 Score: 0    General Health:  - Sleep: sleeps poorly and 4-6 hours of sleep on average.  - Hearing: decreased hearing right ear.  - Vision: wears glasses.  - Dental: no dental visits for > 1 year, brushes teeth three times daily and does not floss.    /GYN Health:  - Follows with GYN: yes.   - Menopause: postmenopausal.   - History of STDs: yes  - Contraception: menopause.      Advanced Care Planning:  - Has an advanced directive?: yes    - Has a durable medical POA?: yes    - ACP document given to patient?: no      Review of Systems   Constitutional:  Negative for chills and fever.   HENT:  Positive for hearing loss. Negative for ear pain and sore throat.    Eyes:  Negative for pain and visual disturbance.   Respiratory:  Positive for shortness of breath. Negative for cough.    Cardiovascular:  Negative for chest pain and palpitations.   Gastrointestinal:  Negative for abdominal pain and vomiting.   Genitourinary:  Negative for dysuria and hematuria.   Musculoskeletal:  Negative for arthralgias and back pain.   Skin:  Negative for color change and rash.   Neurological:  Negative for seizures and syncope.   All other systems reviewed and are negative.      Objective   /84   Pulse 76   Temp 98.1 °F (36.7 °C)   Resp 16   Ht 5' 3\" (1.6 m)   Wt 98.4 kg (217 lb)   BMI 38.44 kg/m²     Physical Exam  Constitutional:       Appearance: Normal appearance.   HENT:      Head: Normocephalic and atraumatic.      Right Ear: Ear canal and external ear normal. Tympanic membrane is perforated.      Left Ear: Tympanic membrane, ear canal and external ear normal.      Nose: Nose normal.      " Mouth/Throat:      Mouth: Mucous membranes are moist.      Pharynx: Oropharynx is clear.   Eyes:      Extraocular Movements: Extraocular movements intact.      Conjunctiva/sclera: Conjunctivae normal.      Pupils: Pupils are equal, round, and reactive to light.   Cardiovascular:      Rate and Rhythm: Normal rate and regular rhythm.      Pulses: Normal pulses.      Heart sounds: Normal heart sounds.   Pulmonary:      Effort: Pulmonary effort is normal.      Breath sounds: Normal breath sounds.   Abdominal:      General: Abdomen is flat. Bowel sounds are normal.      Palpations: Abdomen is soft.   Musculoskeletal:         General: Normal range of motion.      Cervical back: Normal range of motion and neck supple. No tenderness.   Skin:     General: Skin is warm and dry.      Capillary Refill: Capillary refill takes less than 2 seconds.   Neurological:      General: No focal deficit present.      Mental Status: She is alert and oriented to person, place, and time.   Psychiatric:         Mood and Affect: Mood normal.         Behavior: Behavior normal.

## 2025-03-21 ENCOUNTER — AMBUL SURGICAL CARE (OUTPATIENT)
Dept: URBAN - NONMETROPOLITAN AREA SURGERY 1 | Facility: SURGERY | Age: 55
Setting detail: OPHTHALMOLOGY
End: 2025-03-21
Payer: COMMERCIAL

## 2025-03-21 DIAGNOSIS — H40.032: ICD-10-CM

## 2025-03-21 PROCEDURE — 66761 REVISION OF IRIS: CPT | Mod: LT | Performed by: OPHTHALMOLOGY

## 2025-03-28 DIAGNOSIS — I10 HYPERTENSION, UNSPECIFIED TYPE: ICD-10-CM

## 2025-03-29 RX ORDER — AMLODIPINE BESYLATE 10 MG/1
10 TABLET ORAL DAILY
Qty: 90 TABLET | Refills: 1 | Status: SHIPPED | OUTPATIENT
Start: 2025-03-29

## 2025-03-30 DIAGNOSIS — M94.0 TIETZE SYNDROME: ICD-10-CM

## 2025-03-30 DIAGNOSIS — M79.10 MYALGIA: ICD-10-CM

## 2025-03-31 NOTE — TELEPHONE ENCOUNTER
Refill must be reviewed and completed by the office or provider. The refill is unable to be approved or denied by the medication management team.      Last seen 01.2024 and was to return in 6M, appt 05.2025 - Please review to see if the refill is appropriate.

## 2025-04-02 RX ORDER — HYDROXYCHLOROQUINE SULFATE 200 MG/1
400 TABLET, FILM COATED ORAL
Qty: 180 TABLET | Refills: 1 | Status: SHIPPED | OUTPATIENT
Start: 2025-04-02 | End: 2025-09-29

## 2025-04-18 ENCOUNTER — AMBUL SURGICAL CARE (OUTPATIENT)
Dept: URBAN - NONMETROPOLITAN AREA SURGERY 1 | Facility: SURGERY | Age: 55
Setting detail: OPHTHALMOLOGY
End: 2025-04-18
Payer: COMMERCIAL

## 2025-04-18 DIAGNOSIS — H40.031: ICD-10-CM

## 2025-04-18 PROCEDURE — 66761 REVISION OF IRIS: CPT | Mod: RT | Performed by: OPHTHALMOLOGY

## 2025-05-21 ENCOUNTER — TELEPHONE (OUTPATIENT)
Age: 55
End: 2025-05-21

## 2025-05-21 NOTE — TELEPHONE ENCOUNTER
Patient states she may need to get the Flu Shot for an upcoming job. She is wondering if, with her overall health, Dr. Cristina would be okay with her getting it. Patient requests call back to advise.

## 2025-05-21 NOTE — TELEPHONE ENCOUNTER
Left voicemail for patient. -- No underlying conditions that patient should not receive a flu vaccine. Did advise the 6079-6287 flu season vaccines will be expiring 06/30/2025 and be issued again approximately August or September for the 3347-9733 flu season. Patient should have the conversation with her Employer since it is May of the current Season.

## 2025-05-21 NOTE — TELEPHONE ENCOUNTER
Any underlying reason patient should not get a flu vaccine? I will call patient back regarding the timing and flu vaccines expiring in June.

## 2025-06-03 ENCOUNTER — DOCTOR'S OFFICE (OUTPATIENT)
Dept: URBAN - NONMETROPOLITAN AREA CLINIC 1 | Facility: CLINIC | Age: 55
Setting detail: OPHTHALMOLOGY
End: 2025-06-03
Payer: COMMERCIAL

## 2025-06-03 ENCOUNTER — RX ONLY (RX ONLY)
Age: 55
End: 2025-06-03

## 2025-06-03 DIAGNOSIS — H40.033: ICD-10-CM

## 2025-06-03 DIAGNOSIS — Z79.899: ICD-10-CM

## 2025-06-03 PROCEDURE — 99213 OFFICE O/P EST LOW 20 MIN: CPT | Performed by: OPHTHALMOLOGY

## 2025-06-03 ASSESSMENT — REFRACTION_AUTOREFRACTION
OD_CYLINDER: -0.50
OS_SPHERE: +4.00
OD_SPHERE: +3.00
OD_AXIS: 123
OS_AXIS: 078
OS_CYLINDER: -2.00

## 2025-06-03 ASSESSMENT — REFRACTION_CURRENTRX
OD_VPRISM_DIRECTION: BF
OS_SPHERE: +2.25
OS_AXIS: 180
OD_OVR_VA: 20/
OS_CYLINDER: 0.00
OD_OVR_VA: 20/
OS_SPHERE: +2.00
OS_OVR_VA: 20/
OD_OVR_VA: 20/
OD_CYLINDER: -0.25
OS_VPRISM_DIRECTION: BF
OD_AXIS: 006
OS_VPRISM_DIRECTION: BF
OD_CYLINDER: SPH
OD_ADD: +2.25
OS_CYLINDER: -0.25
OS_SPHERE: +2.25
OD_SPHERE: +1.75
OD_SPHERE: +2.00
OS_OVR_VA: 20/
OS_OVR_VA: 20/
OD_AXIS: 002
OD_ADD: +1.50
OS_ADD: +1.75
OS_ADD: +1.50
OD_ADD: +1.75
OS_CYLINDER: -0.25
OD_VPRISM_DIRECTION: BF
OS_ADD: +2.25
OS_VPRISM_DIRECTION: BF
OD_CYLINDER: -0.25
OS_AXIS: 021
OS_AXIS: 171
OD_SPHERE: +1.75
OD_VPRISM_DIRECTION: BF

## 2025-06-03 ASSESSMENT — CONFRONTATIONAL VISUAL FIELD TEST (CVF)
OD_FINDINGS: FULL
OS_FINDINGS: FULL

## 2025-06-03 ASSESSMENT — DRY EYES - PHYSICIAN NOTES
OS_GENERALCOMMENTS: TRACE PEE
OD_GENERALCOMMENTS: TRACE PEE

## 2025-06-03 ASSESSMENT — REFRACTION_MANIFEST
OS_VA2: 20/25+2
OS_CYLINDER: SPH
OS_SPHERE: +2.25
OD_VA1: 20/25+2
OD_SPHERE: +2.00
OS_ADD: +2.25
OD_CYLINDER: SPH
OD_ADD: +2.25
OS_VA1: 20/25+2
OD_VA2: 20/25+2

## 2025-06-03 ASSESSMENT — KERATOMETRY
OD_K2POWER_DIOPTERS: 44.00
OS_K2POWER_DIOPTERS: 43.75
OS_K1POWER_DIOPTERS: 42.75
OD_AXISANGLE_DEGREES: 090
OD_K1POWER_DIOPTERS: 43.00
OS_AXISANGLE_DEGREES: 151

## 2025-06-03 ASSESSMENT — VISUAL ACUITY
OS_BCVA: 20/25-2
OD_BCVA: 20/25-2

## 2025-07-10 DIAGNOSIS — E78.5 DYSLIPIDEMIA: ICD-10-CM

## 2025-07-10 RX ORDER — ATORVASTATIN CALCIUM 10 MG/1
10 TABLET, FILM COATED ORAL DAILY
Qty: 90 TABLET | Refills: 1 | Status: SHIPPED | OUTPATIENT
Start: 2025-07-10

## 2025-07-15 DIAGNOSIS — K21.9 GASTROESOPHAGEAL REFLUX DISEASE: ICD-10-CM

## 2025-07-16 RX ORDER — OMEPRAZOLE 20 MG/1
20 CAPSULE, DELAYED RELEASE ORAL DAILY
Qty: 90 CAPSULE | Refills: 1 | Status: SHIPPED | OUTPATIENT
Start: 2025-07-16

## 2025-07-24 DIAGNOSIS — M94.0 TIETZE SYNDROME: ICD-10-CM

## 2025-07-24 DIAGNOSIS — M79.10 MYALGIA: ICD-10-CM

## 2025-07-25 RX ORDER — CELECOXIB 100 MG/1
CAPSULE ORAL
Qty: 60 CAPSULE | Refills: 6 | Status: SHIPPED | OUTPATIENT
Start: 2025-07-25

## 2025-08-20 ENCOUNTER — OFFICE VISIT (OUTPATIENT)
Dept: FAMILY MEDICINE CLINIC | Facility: CLINIC | Age: 55
End: 2025-08-20
Payer: COMMERCIAL

## 2025-08-20 VITALS
HEART RATE: 76 BPM | SYSTOLIC BLOOD PRESSURE: 124 MMHG | WEIGHT: 198 LBS | RESPIRATION RATE: 16 BRPM | DIASTOLIC BLOOD PRESSURE: 68 MMHG | BODY MASS INDEX: 35.08 KG/M2 | TEMPERATURE: 98.6 F | HEIGHT: 63 IN

## 2025-08-20 DIAGNOSIS — E78.5 DYSLIPIDEMIA: ICD-10-CM

## 2025-08-20 DIAGNOSIS — K63.5 POLYP OF COLON, UNSPECIFIED PART OF COLON, UNSPECIFIED TYPE: ICD-10-CM

## 2025-08-20 DIAGNOSIS — R19.7 DIARRHEA, UNSPECIFIED TYPE: ICD-10-CM

## 2025-08-20 DIAGNOSIS — R10.84 GENERALIZED ABDOMINAL PAIN: Primary | ICD-10-CM

## 2025-08-20 DIAGNOSIS — Z53.20 MAMMOGRAM DECLINED: ICD-10-CM

## 2025-08-20 DIAGNOSIS — H72.91 PERFORATION OF RIGHT TYMPANIC MEMBRANE: ICD-10-CM

## 2025-08-20 DIAGNOSIS — E55.9 VITAMIN D DEFICIENCY: ICD-10-CM

## 2025-08-20 DIAGNOSIS — I10 PRIMARY HYPERTENSION: ICD-10-CM

## 2025-08-20 PROCEDURE — 99214 OFFICE O/P EST MOD 30 MIN: CPT | Performed by: FAMILY MEDICINE

## 2025-08-20 RX ORDER — DICYCLOMINE HYDROCHLORIDE 10 MG/1
10 CAPSULE ORAL
Qty: 120 CAPSULE | Refills: 0 | Status: SHIPPED | OUTPATIENT
Start: 2025-08-20

## 2025-08-21 ENCOUNTER — TELEPHONE (OUTPATIENT)
Dept: ADMINISTRATIVE | Facility: OTHER | Age: 55
End: 2025-08-21

## (undated) DEVICE — Device

## (undated) DEVICE — 1840 FOAM BLOCK NEEDLE COUNTER: Brand: DEVON

## (undated) DEVICE — 4-PORT MANIFOLD: Brand: NEPTUNE 2

## (undated) DEVICE — TELFA NON-ADHERENT ABSORBENT DRESSING: Brand: TELFA

## (undated) DEVICE — GLOVE SRG LF STRL BGL SKNSNS 7 PF

## (undated) DEVICE — POOLE SUCTION HANDLE W/TUBING: Brand: CARDINAL HEALTH

## (undated) DEVICE — PENCIL ROCKER SWITCH CAUTERY HAND CONTROL

## (undated) DEVICE — COTTON TIP APPLICTOR 2 PK